# Patient Record
Sex: MALE | Race: WHITE | NOT HISPANIC OR LATINO | URBAN - METROPOLITAN AREA
[De-identification: names, ages, dates, MRNs, and addresses within clinical notes are randomized per-mention and may not be internally consistent; named-entity substitution may affect disease eponyms.]

---

## 2017-10-23 ENCOUNTER — OFFICE (OUTPATIENT)
Dept: URBAN - METROPOLITAN AREA OTHER 6 | Facility: OTHER | Age: 82
End: 2017-10-23

## 2017-10-23 VITALS — SYSTOLIC BLOOD PRESSURE: 128 MMHG | HEART RATE: 90 BPM | WEIGHT: 164 LBS | DIASTOLIC BLOOD PRESSURE: 68 MMHG

## 2017-10-23 DIAGNOSIS — Z80.0 FAMILY HISTORY OF MALIGNANT NEOPLASM OF DIGESTIVE ORGANS: ICD-10-CM

## 2017-10-23 DIAGNOSIS — D50.9 IRON DEFICIENCY ANEMIA, UNSPECIFIED: ICD-10-CM

## 2017-10-23 PROCEDURE — 99202 OFFICE O/P NEW SF 15 MIN: CPT | Performed by: INTERNAL MEDICINE

## 2017-12-12 ENCOUNTER — HOSPITAL ENCOUNTER (OUTPATIENT)
Facility: HOSPITAL | Age: 82
Setting detail: HOSPITAL OUTPATIENT SURGERY
Discharge: HOME | End: 2017-12-12
Attending: INTERNAL MEDICINE | Admitting: INTERNAL MEDICINE

## 2017-12-12 ENCOUNTER — ON CAMPUS - OUTPATIENT (OUTPATIENT)
Dept: URBAN - METROPOLITAN AREA HOSPITAL 114 | Facility: HOSPITAL | Age: 82
End: 2017-12-12

## 2017-12-12 ENCOUNTER — ANESTHESIA (OUTPATIENT)
Dept: GASTROENTEROLOGY | Facility: HOSPITAL | Age: 82
End: 2017-12-12

## 2017-12-12 ENCOUNTER — ANESTHESIA EVENT (OUTPATIENT)
Dept: GASTROENTEROLOGY | Facility: HOSPITAL | Age: 82
End: 2017-12-12

## 2017-12-12 VITALS
HEIGHT: 67 IN | OXYGEN SATURATION: 94 % | DIASTOLIC BLOOD PRESSURE: 76 MMHG | SYSTOLIC BLOOD PRESSURE: 132 MMHG | BODY MASS INDEX: 25.43 KG/M2 | WEIGHT: 162 LBS | RESPIRATION RATE: 16 BRPM | HEART RATE: 64 BPM | TEMPERATURE: 97.4 F

## 2017-12-12 DIAGNOSIS — D50.9 IRON DEFICIENCY ANEMIA: ICD-10-CM

## 2017-12-12 DIAGNOSIS — K29.50 UNSPECIFIED CHRONIC GASTRITIS WITHOUT BLEEDING: ICD-10-CM

## 2017-12-12 DIAGNOSIS — K55.20 ANGIODYSPLASIA OF COLON WITHOUT HEMORRHAGE: ICD-10-CM

## 2017-12-12 DIAGNOSIS — D50.9 IRON DEFICIENCY ANEMIA, UNSPECIFIED: ICD-10-CM

## 2017-12-12 DIAGNOSIS — K21.0 GASTRO-ESOPHAGEAL REFLUX DISEASE WITH ESOPHAGITIS: ICD-10-CM

## 2017-12-12 PROCEDURE — 45382 COLONOSCOPY W/CONTROL BLEED: CPT | Performed by: INTERNAL MEDICINE

## 2017-12-12 PROCEDURE — 88305 TISSUE EXAM BY PATHOLOGIST: CPT | Performed by: INTERNAL MEDICINE

## 2017-12-12 PROCEDURE — 25010000002 PROPOFOL 10 MG/ML EMULSION: Performed by: ANESTHESIOLOGY

## 2017-12-12 PROCEDURE — 43239 EGD BIOPSY SINGLE/MULTIPLE: CPT | Performed by: INTERNAL MEDICINE

## 2017-12-12 PROCEDURE — 88312 SPECIAL STAINS GROUP 1: CPT | Performed by: INTERNAL MEDICINE

## 2017-12-12 RX ORDER — ASPIRIN 325 MG
81 TABLET ORAL DAILY
COMMUNITY
End: 2019-09-23

## 2017-12-12 RX ORDER — LIDOCAINE HYDROCHLORIDE 20 MG/ML
INJECTION, SOLUTION INFILTRATION; PERINEURAL AS NEEDED
Status: DISCONTINUED | OUTPATIENT
Start: 2017-12-12 | End: 2017-12-12 | Stop reason: SURG

## 2017-12-12 RX ORDER — PROPOFOL 10 MG/ML
VIAL (ML) INTRAVENOUS AS NEEDED
Status: DISCONTINUED | OUTPATIENT
Start: 2017-12-12 | End: 2017-12-12 | Stop reason: SURG

## 2017-12-12 RX ORDER — SODIUM CHLORIDE 0.9 % (FLUSH) 0.9 %
3 SYRINGE (ML) INJECTION AS NEEDED
Status: DISCONTINUED | OUTPATIENT
Start: 2017-12-12 | End: 2017-12-12 | Stop reason: HOSPADM

## 2017-12-12 RX ORDER — AMLODIPINE BESYLATE 5 MG/1
5 TABLET ORAL DAILY
COMMUNITY
End: 2020-05-04 | Stop reason: SDDI

## 2017-12-12 RX ORDER — SODIUM CHLORIDE, SODIUM LACTATE, POTASSIUM CHLORIDE, CALCIUM CHLORIDE 600; 310; 30; 20 MG/100ML; MG/100ML; MG/100ML; MG/100ML
1000 INJECTION, SOLUTION INTRAVENOUS CONTINUOUS PRN
Status: DISCONTINUED | OUTPATIENT
Start: 2017-12-12 | End: 2017-12-12 | Stop reason: HOSPADM

## 2017-12-12 RX ORDER — LIDOCAINE HYDROCHLORIDE 10 MG/ML
0.5 INJECTION, SOLUTION INFILTRATION; PERINEURAL ONCE AS NEEDED
Status: DISCONTINUED | OUTPATIENT
Start: 2017-12-12 | End: 2017-12-12 | Stop reason: HOSPADM

## 2017-12-12 RX ORDER — HYDROXYCHLOROQUINE SULFATE 200 MG/1
200 TABLET, FILM COATED ORAL 2 TIMES DAILY
COMMUNITY

## 2017-12-12 RX ORDER — LEVETIRACETAM 500 MG/1
500 TABLET ORAL 2 TIMES DAILY
COMMUNITY

## 2017-12-12 RX ORDER — PROPOFOL 10 MG/ML
VIAL (ML) INTRAVENOUS CONTINUOUS PRN
Status: DISCONTINUED | OUTPATIENT
Start: 2017-12-12 | End: 2017-12-12 | Stop reason: SURG

## 2017-12-12 RX ADMIN — PROPOFOL 300 MCG/KG/MIN: 10 INJECTION, EMULSION INTRAVENOUS at 09:23

## 2017-12-12 RX ADMIN — PROPOFOL 25 MG: 10 INJECTION, EMULSION INTRAVENOUS at 09:39

## 2017-12-12 RX ADMIN — LIDOCAINE HYDROCHLORIDE 40 MG: 20 INJECTION, SOLUTION INFILTRATION; PERINEURAL at 09:23

## 2017-12-12 RX ADMIN — SODIUM CHLORIDE, POTASSIUM CHLORIDE, SODIUM LACTATE AND CALCIUM CHLORIDE: 600; 310; 30; 20 INJECTION, SOLUTION INTRAVENOUS at 09:20

## 2017-12-12 RX ADMIN — SODIUM CHLORIDE, POTASSIUM CHLORIDE, SODIUM LACTATE AND CALCIUM CHLORIDE 1000 ML: 600; 310; 30; 20 INJECTION, SOLUTION INTRAVENOUS at 09:02

## 2017-12-12 RX ADMIN — PROPOFOL 100 MG: 10 INJECTION, EMULSION INTRAVENOUS at 09:23

## 2017-12-12 NOTE — PLAN OF CARE
Problem: GI Endoscopy (Adult)  Intervention: Monitor/Manage Procedure Recovery    12/12/17 0830   Respiratory Interventions   Airway/Ventilation Management airway patency maintained   Coping/Psychosocial Interventions   Environmental Support calm environment promoted   Activity   Activity Type activity adjusted per tolerance   Cardiac Interventions   Warming Thermoregulation Maintenance warm blankets applied         Goal: Signs and Symptoms of Listed Potential Problems Will be Absent or Manageable (GI Endoscopy)  Outcome: Ongoing (interventions implemented as appropriate)    12/12/17 0830   GI Endoscopy   Problems Assessed (GI Endoscopy) pain   Problems Present (GI Endoscopy) none

## 2017-12-12 NOTE — ANESTHESIA PREPROCEDURE EVALUATION
Anesthesia Evaluation     Patient summary reviewed and Nursing notes reviewed          Airway   Mallampati: II  TM distance: <3 FB  Neck ROM: full  possible difficult intubation  Dental - normal exam     Pulmonary - normal exam   (+) a smoker Former,   Cardiovascular - normal exam    (+) hypertension,       Neuro/Psych  (+) seizures,    GI/Hepatic/Renal/Endo - negative ROS     Musculoskeletal (-) negative ROS    Abdominal  - normal exam   Substance History - negative use     OB/GYN negative ob/gyn ROS         Other                                      Anesthesia Plan    ASA 2     MAC     intravenous induction   Anesthetic plan and risks discussed with patient.

## 2017-12-12 NOTE — H&P
Patient Care Team:  Tej Cedillo Jr., MD as PCP - General  Tej Cedillo Jr., MD as PCP - Family Medicine    Chief complaint anemia on labs     Subjective     History of Present Illness  Feels fine also family history of colon cancer in a sibling been on plaquenil for RA>  Review of Systems   All other systems reviewed and are negative.       Past Medical History:   Diagnosis Date   • Cancer     skin cancer   • Hypertension    • Seizures      Past Surgical History:   Procedure Laterality Date   • APPENDECTOMY       History reviewed. No pertinent family history.  Social History   Substance Use Topics   • Smoking status: Former Smoker     Quit date: 12/12/1987   • Smokeless tobacco: None   • Alcohol use No     Prescriptions Prior to Admission   Medication Sig Dispense Refill Last Dose   • amLODIPine (NORVASC) 5 MG tablet Take 5 mg by mouth Daily.   12/12/2017 at Unknown time   • aspirin 325 MG tablet Take 81 mg by mouth Daily.   12/7/2017   • hydroxychloroquine (PLAQUENIL) 200 MG tablet Take  by mouth Daily.   12/12/2017 at Unknown time   • levETIRAcetam (KEPPRA) 500 MG tablet Take 500 mg by mouth 2 (Two) Times a Day.   12/12/2017 at Unknown time   • Multiple Vitamin (MULTI VITAMIN MENS PO) Take  by mouth.   12/12/2017 at Unknown time     Allergies:  Review of patient's allergies indicates no known allergies.    Objective      Vital Signs  Temp:  [98.1 °F (36.7 °C)] 98.1 °F (36.7 °C)  Heart Rate:  [69] 69  Resp:  [16] 16  BP: (145)/(74) 145/74    Physical Exam   Constitutional: He is oriented to person, place, and time. He appears well-developed and well-nourished.   HENT:   Mouth/Throat: Oropharynx is clear and moist.   Eyes: Conjunctivae are normal.   Neck: Neck supple.   Cardiovascular: Normal rate.    Pulmonary/Chest: Effort normal.   Abdominal: Soft.   Neurological: He is alert and oriented to person, place, and time.   Skin: Skin is warm and dry.   Psychiatric: He has a normal mood and affect.        Results Review:   I reviewed the patient's new clinical results.      Assessment/Plan     Active Problems:    * No active hospital problems. *      Assessment:  (Iron deficiency anemia  Family history of colon cancer ).     Plan:   (Upper and lower tract endoscopy, risks, alternatives and benefits dicussed  with patient and patient is agreeable to proceed.).       I discussed the patients findings and my recommendations with patient and nursing staff    Lucio Stein MD  12/12/17  9:24 AM

## 2017-12-12 NOTE — ANESTHESIA POSTPROCEDURE EVALUATION
"Patient: Juan C Ca    Procedure Summary     Date Anesthesia Start Anesthesia Stop Room / Location    12/12/17 0920 1003  [facility]       Procedure Diagnosis Surgeon Provider    ESOPHAGOGASTRODUODENOSCOPY with BX  (N/A Esophagus); COLONOSCOPY TO CECUM AND TI WITH APC CAUTERY OF RIGHT COLON AVM  (N/A ) No diagnosis on file. MD Tremaine Cardozo MD          Anesthesia Type: MAC  Last vitals  BP   145/74 (12/12/17 0851)   Temp   36.7 °C (98.1 °F) (12/12/17 0851)   Pulse   69 (12/12/17 0851)   Resp   16 (12/12/17 0851)     SpO2   96 % (12/12/17 0851)     Post Anesthesia Care and Evaluation    Patient location during evaluation: PHASE II  Patient participation: complete - patient participated  Level of consciousness: awake and alert  Pain management: adequate  Airway patency: patent  Anesthetic complications: No anesthetic complications  PONV Status: none  Cardiovascular status: acceptable  Respiratory status: acceptable  Hydration status: acceptable    Comments: /60 (BP Location: Left arm, Patient Position: Lying)  Pulse 67  Temp 36.7 °C (98.1 °F) (Oral)   Resp 15  Ht 170.2 cm (67\")  Wt 73.5 kg (162 lb)  SpO2 97%  BMI 25.37 kg/m2        "

## 2017-12-14 LAB
LAB AP CASE REPORT: NORMAL
Lab: NORMAL
PATH REPORT.ADDENDUM SPEC: NORMAL
PATH REPORT.FINAL DX SPEC: NORMAL
PATH REPORT.GROSS SPEC: NORMAL

## 2019-02-18 ENCOUNTER — TRANSCRIBE ORDERS (OUTPATIENT)
Dept: ADMINISTRATIVE | Facility: HOSPITAL | Age: 84
End: 2019-02-18

## 2019-02-18 DIAGNOSIS — R26.81 UNSTEADY GAIT: Primary | ICD-10-CM

## 2019-02-26 ENCOUNTER — HOSPITAL ENCOUNTER (OUTPATIENT)
Dept: MRI IMAGING | Facility: HOSPITAL | Age: 84
Discharge: HOME | End: 2019-02-26
Admitting: INTERNAL MEDICINE

## 2019-02-26 DIAGNOSIS — R26.81 UNSTEADY GAIT: ICD-10-CM

## 2019-02-26 PROCEDURE — 70551 MRI BRAIN STEM W/O DYE: CPT

## 2019-02-27 ENCOUNTER — APPOINTMENT (OUTPATIENT)
Dept: MRI IMAGING | Facility: HOSPITAL | Age: 84
End: 2019-02-27

## 2019-04-08 ENCOUNTER — LAB (OUTPATIENT)
Dept: LAB | Facility: HOSPITAL | Age: 84
End: 2019-04-08

## 2019-04-08 ENCOUNTER — CONSULT (OUTPATIENT)
Dept: ONCOLOGY | Facility: CLINIC | Age: 84
End: 2019-04-08

## 2019-04-08 VITALS
WEIGHT: 155.5 LBS | HEIGHT: 68 IN | DIASTOLIC BLOOD PRESSURE: 70 MMHG | BODY MASS INDEX: 23.57 KG/M2 | TEMPERATURE: 98.1 F | HEART RATE: 92 BPM | OXYGEN SATURATION: 95 % | RESPIRATION RATE: 18 BRPM | SYSTOLIC BLOOD PRESSURE: 192 MMHG

## 2019-04-08 DIAGNOSIS — D50.9 IRON DEFICIENCY ANEMIA, UNSPECIFIED IRON DEFICIENCY ANEMIA TYPE: ICD-10-CM

## 2019-04-08 DIAGNOSIS — D50.9 IRON DEFICIENCY ANEMIA, UNSPECIFIED IRON DEFICIENCY ANEMIA TYPE: Primary | ICD-10-CM

## 2019-04-08 DIAGNOSIS — D64.9 ANEMIA, UNSPECIFIED TYPE: Primary | ICD-10-CM

## 2019-04-08 LAB
BASOPHILS # BLD AUTO: 0.06 10*3/MM3 (ref 0–0.2)
BASOPHILS NFR BLD AUTO: 0.5 % (ref 0–1.5)
CHROMATIN AB SERPL-ACNC: 10.5 IU/ML (ref 0–14)
CRP SERPL-MCNC: 6.81 MG/DL (ref 0–0.5)
DEPRECATED RDW RBC AUTO: 46.6 FL (ref 37–54)
EOSINOPHIL # BLD AUTO: 0.17 10*3/MM3 (ref 0–0.4)
EOSINOPHIL NFR BLD AUTO: 1.5 % (ref 0.3–6.2)
ERYTHROCYTE [DISTWIDTH] IN BLOOD BY AUTOMATED COUNT: 15.9 % (ref 12.3–15.4)
HCT VFR BLD AUTO: 37.8 % (ref 37.5–51)
HGB BLD-MCNC: 12.4 G/DL (ref 13–17.7)
HGB RETIC QN AUTO: 29.2 PG (ref 29.8–36.1)
IMM GRANULOCYTES # BLD AUTO: 0.08 10*3/MM3 (ref 0–0.05)
IMM GRANULOCYTES NFR BLD AUTO: 0.7 % (ref 0–0.5)
IMM RETICS NFR: 5.4 % (ref 3–15.8)
LYMPHOCYTES # BLD AUTO: 1.57 10*3/MM3 (ref 0.7–3.1)
LYMPHOCYTES NFR BLD AUTO: 14.1 % (ref 19.6–45.3)
MCH RBC QN AUTO: 26.7 PG (ref 26.6–33)
MCHC RBC AUTO-ENTMCNC: 32.8 G/DL (ref 31.5–35.7)
MCV RBC AUTO: 81.5 FL (ref 79–97)
MONOCYTES # BLD AUTO: 1.11 10*3/MM3 (ref 0.1–0.9)
MONOCYTES NFR BLD AUTO: 9.9 % (ref 5–12)
NEUTROPHILS # BLD AUTO: 8.17 10*3/MM3 (ref 1.4–7)
NEUTROPHILS NFR BLD AUTO: 73.3 % (ref 42.7–76)
NRBC BLD AUTO-RTO: 0 /100 WBC (ref 0–0)
PLATELET # BLD AUTO: 241 10*3/MM3 (ref 140–450)
PMV BLD AUTO: 11.1 FL (ref 6–12)
RBC # BLD AUTO: 4.64 10*6/MM3 (ref 4.14–5.8)
RETICS/RBC NFR AUTO: 0.99 % (ref 0.5–1.5)
VIT B12 BLD-MCNC: 709 PG/ML (ref 211–946)
WBC NRBC COR # BLD: 11.16 10*3/MM3 (ref 3.4–10.8)

## 2019-04-08 PROCEDURE — 83540 ASSAY OF IRON: CPT | Performed by: INTERNAL MEDICINE

## 2019-04-08 PROCEDURE — 84466 ASSAY OF TRANSFERRIN: CPT | Performed by: INTERNAL MEDICINE

## 2019-04-08 PROCEDURE — 99204 OFFICE O/P NEW MOD 45 MIN: CPT | Performed by: INTERNAL MEDICINE

## 2019-04-08 PROCEDURE — 83615 LACTATE (LD) (LDH) ENZYME: CPT | Performed by: INTERNAL MEDICINE

## 2019-04-08 PROCEDURE — 36415 COLL VENOUS BLD VENIPUNCTURE: CPT | Performed by: INTERNAL MEDICINE

## 2019-04-08 PROCEDURE — 82607 VITAMIN B-12: CPT | Performed by: INTERNAL MEDICINE

## 2019-04-08 PROCEDURE — 82728 ASSAY OF FERRITIN: CPT | Performed by: INTERNAL MEDICINE

## 2019-04-08 PROCEDURE — 85046 RETICYTE/HGB CONCENTRATE: CPT | Performed by: INTERNAL MEDICINE

## 2019-04-08 PROCEDURE — 80053 COMPREHEN METABOLIC PANEL: CPT | Performed by: INTERNAL MEDICINE

## 2019-04-08 PROCEDURE — 86140 C-REACTIVE PROTEIN: CPT | Performed by: INTERNAL MEDICINE

## 2019-04-08 PROCEDURE — 85025 COMPLETE CBC W/AUTO DIFF WBC: CPT | Performed by: INTERNAL MEDICINE

## 2019-04-08 PROCEDURE — 86431 RHEUMATOID FACTOR QUANT: CPT | Performed by: INTERNAL MEDICINE

## 2019-04-08 RX ORDER — PREDNISONE 1 MG/1
TABLET ORAL
COMMUNITY
Start: 2019-03-19 | End: 2019-06-13

## 2019-04-08 RX ORDER — FERROUS SULFATE 325(65) MG
1 TABLET ORAL
COMMUNITY
End: 2019-04-08 | Stop reason: SDUPTHER

## 2019-04-08 RX ORDER — FERROUS SULFATE 325(65) MG
1 TABLET ORAL
Qty: 90 TABLET | Refills: 3 | Status: SHIPPED | OUTPATIENT
Start: 2019-04-08 | End: 2019-06-13

## 2019-04-09 LAB
ALBUMIN SERPL-MCNC: 3.2 G/DL (ref 2.9–4.4)
ALBUMIN SERPL-MCNC: 4 G/DL (ref 3.5–5.2)
ALBUMIN/GLOB SERPL: 1 {RATIO} (ref 0.7–1.7)
ALBUMIN/GLOB SERPL: 1.5 G/DL (ref 1.1–2.4)
ALP SERPL-CCNC: 75 U/L (ref 38–116)
ALPHA1 GLOB FLD ELPH-MCNC: 0.4 G/DL (ref 0–0.4)
ALPHA2 GLOB SERPL ELPH-MCNC: 1.2 G/DL (ref 0.4–1)
ALT SERPL W P-5'-P-CCNC: 18 U/L (ref 0–41)
ANION GAP SERPL CALCULATED.3IONS-SCNC: 16.9 MMOL/L
AST SERPL-CCNC: 21 U/L (ref 0–40)
B-GLOBULIN SERPL ELPH-MCNC: 1.1 G/DL (ref 0.7–1.3)
BILIRUB SERPL-MCNC: 0.3 MG/DL (ref 0.2–1.2)
BUN BLD-MCNC: 17 MG/DL (ref 6–20)
BUN/CREAT SERPL: 16.5 (ref 7.3–30)
CALCIUM SPEC-SCNC: 9.2 MG/DL (ref 8.5–10.2)
CENTROMERE B AB SER-ACNC: <0.2 AI (ref 0–0.9)
CHLORIDE SERPL-SCNC: 98 MMOL/L (ref 98–107)
CHROMATIN AB SERPL-ACNC: <0.2 AI (ref 0–0.9)
CO2 SERPL-SCNC: 23.1 MMOL/L (ref 22–29)
CREAT BLD-MCNC: 1.03 MG/DL (ref 0.7–1.3)
DSDNA AB SER-ACNC: <1 IU/ML (ref 0–9)
ENA JO1 AB SER-ACNC: <0.2 AI (ref 0–0.9)
ENA RNP AB SER-ACNC: 0.2 AI (ref 0–0.9)
ENA SCL70 AB SER-ACNC: <0.2 AI (ref 0–0.9)
ENA SM AB SER-ACNC: <0.2 AI (ref 0–0.9)
ENA SS-A AB SER-ACNC: <0.2 AI (ref 0–0.9)
ENA SS-B AB SER-ACNC: <0.2 AI (ref 0–0.9)
FERRITIN SERPL-MCNC: 88.1 NG/ML (ref 30–400)
FOLATE BLD-MCNC: 470.7 NG/ML
FOLATE RBC-MCNC: 1315 NG/ML
GAMMA GLOB SERPL ELPH-MCNC: 0.7 G/DL (ref 0.4–1.8)
GFR SERPL CREATININE-BSD FRML MDRD: 69 ML/MIN/1.73
GLOBULIN SER CALC-MCNC: 3.4 G/DL (ref 2.2–3.9)
GLOBULIN UR ELPH-MCNC: 2.7 GM/DL (ref 1.8–3.5)
GLUCOSE BLD-MCNC: 87 MG/DL (ref 74–124)
HCT VFR BLD AUTO: 35.8 % (ref 37.5–51)
IGA SERPL-MCNC: 48 MG/DL (ref 61–437)
IGG SERPL-MCNC: 607 MG/DL (ref 700–1600)
IGM SERPL-MCNC: 60 MG/DL (ref 15–143)
INTERPRETATION SERPL IEP-IMP: ABNORMAL
IRON 24H UR-MRATE: 22 MCG/DL (ref 59–158)
IRON SATN MFR SERPL: 6 % (ref 14–48)
KAPPA LC SERPL-MCNC: 14 MG/L (ref 3.3–19.4)
KAPPA LC/LAMBDA SER: 1.01 {RATIO} (ref 0.26–1.65)
LAMBDA LC FREE SERPL-MCNC: 13.8 MG/L (ref 5.7–26.3)
LDH SERPL-CCNC: 199 U/L (ref 99–259)
Lab: ABNORMAL
Lab: NORMAL
M-SPIKE: ABNORMAL G/DL
POTASSIUM BLD-SCNC: 4 MMOL/L (ref 3.5–4.7)
PROT SERPL-MCNC: 6.6 G/DL (ref 6–8.5)
PROT SERPL-MCNC: 6.7 G/DL (ref 6.3–8)
REF LAB TEST METHOD: NORMAL
SODIUM BLD-SCNC: 138 MMOL/L (ref 134–145)
TIBC SERPL-MCNC: 346 MCG/DL (ref 249–505)
TRANSFERRIN SERPL-MCNC: 247 MG/DL (ref 200–360)

## 2019-05-06 ENCOUNTER — OFFICE VISIT (OUTPATIENT)
Dept: ONCOLOGY | Facility: CLINIC | Age: 84
End: 2019-05-06

## 2019-05-06 ENCOUNTER — LAB (OUTPATIENT)
Dept: LAB | Facility: HOSPITAL | Age: 84
End: 2019-05-06

## 2019-05-06 VITALS
BODY MASS INDEX: 23.37 KG/M2 | OXYGEN SATURATION: 92 % | HEIGHT: 68 IN | RESPIRATION RATE: 16 BRPM | TEMPERATURE: 98.6 F | SYSTOLIC BLOOD PRESSURE: 136 MMHG | WEIGHT: 154.2 LBS | HEART RATE: 74 BPM | DIASTOLIC BLOOD PRESSURE: 71 MMHG

## 2019-05-06 DIAGNOSIS — D50.9 IRON DEFICIENCY ANEMIA, UNSPECIFIED IRON DEFICIENCY ANEMIA TYPE: Primary | ICD-10-CM

## 2019-05-06 DIAGNOSIS — T45.4X5A ADVERSE EFFECT OF PREPARATION OF IRON COMPOUND, INITIAL ENCOUNTER: ICD-10-CM

## 2019-05-06 DIAGNOSIS — D50.9 IRON DEFICIENCY ANEMIA, UNSPECIFIED IRON DEFICIENCY ANEMIA TYPE: ICD-10-CM

## 2019-05-06 LAB
ALBUMIN SERPL-MCNC: 3.7 G/DL (ref 3.5–5.2)
ALBUMIN/GLOB SERPL: 1.1 G/DL (ref 1.1–2.4)
ALP SERPL-CCNC: 71 U/L (ref 38–116)
ALT SERPL W P-5'-P-CCNC: 17 U/L (ref 0–41)
ANION GAP SERPL CALCULATED.3IONS-SCNC: 11.4 MMOL/L
AST SERPL-CCNC: 18 U/L (ref 0–40)
BASOPHILS # BLD AUTO: 0.05 10*3/MM3 (ref 0–0.2)
BASOPHILS NFR BLD AUTO: 0.6 % (ref 0–1.5)
BILIRUB SERPL-MCNC: 0.3 MG/DL (ref 0.2–1.2)
BUN BLD-MCNC: 18 MG/DL (ref 6–20)
BUN/CREAT SERPL: 16.8 (ref 7.3–30)
CALCIUM SPEC-SCNC: 9.4 MG/DL (ref 8.5–10.2)
CHLORIDE SERPL-SCNC: 103 MMOL/L (ref 98–107)
CO2 SERPL-SCNC: 25.6 MMOL/L (ref 22–29)
CREAT BLD-MCNC: 1.07 MG/DL (ref 0.7–1.3)
DEPRECATED RDW RBC AUTO: 47.9 FL (ref 37–54)
EOSINOPHIL # BLD AUTO: 0.12 10*3/MM3 (ref 0–0.4)
EOSINOPHIL NFR BLD AUTO: 1.4 % (ref 0.3–6.2)
ERYTHROCYTE [DISTWIDTH] IN BLOOD BY AUTOMATED COUNT: 15.8 % (ref 12.3–15.4)
FERRITIN SERPL-MCNC: 84.3 NG/ML (ref 30–400)
GFR SERPL CREATININE-BSD FRML MDRD: 66 ML/MIN/1.73
GLOBULIN UR ELPH-MCNC: 3.3 GM/DL (ref 1.8–3.5)
GLUCOSE BLD-MCNC: 126 MG/DL (ref 74–124)
HCT VFR BLD AUTO: 37 % (ref 37.5–51)
HGB BLD-MCNC: 11.7 G/DL (ref 13–17.7)
IMM GRANULOCYTES # BLD AUTO: 0.03 10*3/MM3 (ref 0–0.05)
IMM GRANULOCYTES NFR BLD AUTO: 0.3 % (ref 0–0.5)
IRON 24H UR-MRATE: 34 MCG/DL (ref 59–158)
IRON SATN MFR SERPL: 10 % (ref 14–48)
LYMPHOCYTES # BLD AUTO: 1.33 10*3/MM3 (ref 0.7–3.1)
LYMPHOCYTES NFR BLD AUTO: 15.4 % (ref 19.6–45.3)
MCH RBC QN AUTO: 26.4 PG (ref 26.6–33)
MCHC RBC AUTO-ENTMCNC: 31.6 G/DL (ref 31.5–35.7)
MCV RBC AUTO: 83.3 FL (ref 79–97)
MONOCYTES # BLD AUTO: 0.66 10*3/MM3 (ref 0.1–0.9)
MONOCYTES NFR BLD AUTO: 7.6 % (ref 5–12)
NEUTROPHILS # BLD AUTO: 6.46 10*3/MM3 (ref 1.7–7)
NEUTROPHILS NFR BLD AUTO: 74.7 % (ref 42.7–76)
NRBC BLD AUTO-RTO: 0 /100 WBC (ref 0–0.2)
PLATELET # BLD AUTO: 313 10*3/MM3 (ref 140–450)
PMV BLD AUTO: 9.3 FL (ref 6–12)
POTASSIUM BLD-SCNC: 4.3 MMOL/L (ref 3.5–4.7)
PROT SERPL-MCNC: 7 G/DL (ref 6.3–8)
RBC # BLD AUTO: 4.44 10*6/MM3 (ref 4.14–5.8)
SODIUM BLD-SCNC: 140 MMOL/L (ref 134–145)
TIBC SERPL-MCNC: 336 MCG/DL (ref 249–505)
TRANSFERRIN SERPL-MCNC: 240 MG/DL (ref 200–360)
WBC NRBC COR # BLD: 8.65 10*3/MM3 (ref 3.4–10.8)

## 2019-05-06 PROCEDURE — 84466 ASSAY OF TRANSFERRIN: CPT | Performed by: INTERNAL MEDICINE

## 2019-05-06 PROCEDURE — 82728 ASSAY OF FERRITIN: CPT | Performed by: INTERNAL MEDICINE

## 2019-05-06 PROCEDURE — 83540 ASSAY OF IRON: CPT | Performed by: INTERNAL MEDICINE

## 2019-05-06 PROCEDURE — 80053 COMPREHEN METABOLIC PANEL: CPT | Performed by: INTERNAL MEDICINE

## 2019-05-06 PROCEDURE — 36415 COLL VENOUS BLD VENIPUNCTURE: CPT | Performed by: INTERNAL MEDICINE

## 2019-05-06 PROCEDURE — 85025 COMPLETE CBC W/AUTO DIFF WBC: CPT | Performed by: INTERNAL MEDICINE

## 2019-05-06 PROCEDURE — 99214 OFFICE O/P EST MOD 30 MIN: CPT | Performed by: NURSE PRACTITIONER

## 2019-05-06 RX ORDER — SODIUM CHLORIDE 9 MG/ML
250 INJECTION, SOLUTION INTRAVENOUS ONCE
Status: CANCELLED | OUTPATIENT
Start: 2019-05-08

## 2019-05-06 RX ORDER — PROCHLORPERAZINE MALEATE 10 MG
10 TABLET ORAL ONCE
Status: CANCELLED
Start: 2019-05-08 | End: 2019-05-08

## 2019-05-06 RX ORDER — PROCHLORPERAZINE MALEATE 10 MG
10 TABLET ORAL ONCE
Status: CANCELLED
Start: 2019-05-15 | End: 2019-05-15

## 2019-05-06 RX ORDER — SODIUM CHLORIDE 9 MG/ML
250 INJECTION, SOLUTION INTRAVENOUS ONCE
Status: CANCELLED | OUTPATIENT
Start: 2019-05-15

## 2019-05-06 NOTE — PROGRESS NOTES
Subjective     REASON FOR FOLLOW-UP: Iron deficiency anemia    HISTORY OF PRESENT ILLNESS:  The patient is a 83 y.o. year old male who is here for an opinion about the above issue.    History of Present Illness    The patient returns to the office today for scheduled follow-up in 1 month lab review.  When seen by Dr. Novoa 1 month ago, was recommended he increase his oral iron to twice daily due to persistent iron deficiency.  He did increase his oral iron to twice daily, though unfortunately has developed constipation.  He has been able to manage his constipation, though does require daily medications.  He denies signs or symptoms of bleeding.  He reports his stools are dark in color though denies his stools being black or red in color.  He continues to struggle with pain secondary to his rheumatoid arthritis.  He does have exertional shortness of breath, though denies shortness of breath or chest pain at rest.  He reports fatigue which is unchanged.    Past Medical History:   Diagnosis Date   • Cancer (CMS/HCC)     skin cancer   • CVD (cerebrovascular disease)     with remote infarcts per CT of head.   • H/O Iron deficiency anemia    • H/O Traumatic brain injury (CMS/HCC)    • H/O: pneumonia 04/2015   • Hyperlipidemia    • Hypertension    • Rheumatoid arthritis (CMS/HCC)    • Seizures (CMS/HCC)     Started in 1960s.        Past Surgical History:   Procedure Laterality Date   • APPENDECTOMY  1950   • CATARACT EXTRACTION     • COLONOSCOPY N/A 12/12/2017    Procedure: COLONOSCOPY TO CECUM AND TI WITH APC CAUTERY OF RIGHT COLON AVM ;  Surgeon: Lucio Stein MD;  Location: [facility];  Service:    • ENDOSCOPY N/A 12/12/2017    Procedure: ESOPHAGOGASTRODUODENOSCOPY with BX ;  Surgeon: Lucio Stein MD;  Location: [facility] ENDOSCOPY;  Service:         Current Outpatient Medications:   •  amLODIPine (NORVASC) 5 MG tablet, Take 5 mg by mouth Daily., Disp: , Rfl:   •  aspirin 325 MG tablet, Take 81 mg by mouth  Daily., Disp: , Rfl:   •  Cholecalciferol (VITAMIN D PO), Take  by mouth Every 14 (Fourteen) Days., Disp: , Rfl:   •  Docusate Sodium (COLACE PO), Take  by mouth As Needed., Disp: , Rfl:   •  ferrous sulfate 325 (65 FE) MG tablet, Take 1 tablet by mouth 3 (Three) Times a Day With Meals., Disp: 90 tablet, Rfl: 3  •  hydroxychloroquine (PLAQUENIL) 200 MG tablet, Take  by mouth Daily., Disp: , Rfl:   •  levETIRAcetam (KEPPRA) 500 MG tablet, Take 500 mg by mouth 2 (Two) Times a Day., Disp: , Rfl:   •  Multiple Vitamin (MULTI VITAMIN MENS PO), Take  by mouth., Disp: , Rfl:   •  predniSONE (DELTASONE) 5 MG tablet, , Disp: , Rfl:     ALLERGIES:  No Known Allergies     Social History     Socioeconomic History   • Marital status:      Spouse name: Abbie   • Number of children: Not on file   • Years of education: College   • Highest education level: Not on file   Occupational History     Employer: RETIRED   Tobacco Use   • Smoking status: Former Smoker     Years: 15.00     Types: Cigarettes     Last attempt to quit: 1987     Years since quittin.4   • Smokeless tobacco: Never Used   Substance and Sexual Activity   • Alcohol use: No   • Drug use: No   • Sexual activity: Defer        Family History   Problem Relation Age of Onset   • Colon cancer Other    • Hypertension Mother    • Mental illness Mother    • Hypertension Father    • Heart disease Sister    • Hypertension Sister    • Colon cancer Sister    • Skin cancer Sister    • Heart disease Brother    • Hypertension Brother    • Lung cancer Brother    • Diabetes Daughter    • Throat cancer Brother    • Cancer Brother         Bladder   • Prostate cancer Brother       I have reviewed the patient's medical history in detail and updated the computerized patient record.    Review of Systems   Constitutional: Positive for fatigue. Negative for appetite change, chills, diaphoresis, fever and unexpected weight change.   HENT: Negative for hearing loss, sore throat  "and trouble swallowing.    Respiratory: Positive for shortness of breath. Negative for cough, chest tightness and wheezing.    Cardiovascular: Negative for chest pain, palpitations and leg swelling.   Gastrointestinal: Negative for abdominal distention, abdominal pain, constipation, diarrhea, nausea and vomiting.   Genitourinary: Negative for dysuria, frequency, hematuria and urgency.   Musculoskeletal: Positive for arthralgias and back pain. Negative for joint swelling.        No muscle weakness.   Skin: Negative for rash and wound.   Neurological: Positive for weakness. Negative for seizures, syncope, speech difficulty, numbness and headaches.   Hematological: Negative for adenopathy. Does not bruise/bleed easily.   Psychiatric/Behavioral: Negative for behavioral problems, confusion and suicidal ideas.   Review of systems 5/6/2019 unchanged from previous office visit except as updated    Objective     Vitals:    05/06/19 1138   BP: 136/71   Pulse: 74   Resp: 16   Temp: 98.6 °F (37 °C)   TempSrc: Oral   SpO2: 92%   Weight: 69.9 kg (154 lb 3.2 oz)   Height: 172.5 cm (67.91\")   PainSc: 5  Comment: pain in both legs     Current Status 5/6/2019   ECOG score 0       Physical Exam    GENERAL:  Well-developed, well-nourished in no acute distress.   SKIN:  Warm, dry without rashes, purpura or petechiae.  EYES:  Pupils equal, round and reactive to light.  EOMs intact.  Conjunctivae normal.  EARS:  Hearing intact.  NOSE:  Septum midline.  No excoriations or nasal discharge.  CHEST:  Lungs clear to auscultation. Good airflow.  CARDIAC:  Regular rate and rhythm without murmurs, rubs or gallops. Normal S1,S2.  ABDOMEN:  Soft, nontender, bowel sounds present  EXTREMITIES:  No clubbing, cyanosis or edema.  NEUROLOGICAL:  Cranial Nerves II-XII grossly intact.  No focal neurological deficits.  PSYCHIATRIC:  Normal affect and mood.    Physical exam 5/6/2019 unchanged from previous office visit except as updated    RECENT " "LABS:  Results from last 7 days   Lab Units 19  1054   WBC 10*3/mm3 8.65   NEUTROS ABS 10*3/mm3 6.46   HEMOGLOBIN g/dL 11.7*   HEMATOCRIT % 37.0*   PLATELETS 10*3/mm3 313     Results from last 7 days   Lab Units 19  1054   SODIUM mmol/L 140   POTASSIUM mmol/L 4.3   CHLORIDE mmol/L 103   CO2 mmol/L 25.6   BUN mg/dL 18   CREATININE mg/dL 1.07   CALCIUM mg/dL 9.4   ALBUMIN g/dL 3.70   BILIRUBIN mg/dL 0.3   ALK PHOS U/L 71   ALT (SGPT) U/L 17   AST (SGOT) U/L 18   GLUCOSE mg/dL 126*   FERRITIN ng/mL 84.30   IRON mcg/dL 34*   TIBC mcg/dL 336           Assessment/Plan   1.  Iron deficiency anemia: Patient was seen by Dr. Cedillo 2019 he was found to be iron deficient.  He denied any active GI bleeding.  He has been started on ferrous sulfate 325 mg 1 p.o. daily but without adequate control.  B12, folate, serum protein electrophoresis and flow cytometry negative.  Ferrous sulfate increased to twice daily 2019 though this resulted in abdominal cramping and constipation.    The patient remains iron deficient today with a decreased hemoglobin at 11.7, iron saturation of 10%.  We will proceed with Injectafer x2.  The patient has previously been seen by Dr. Rachel Stein 2017 with EGD and colonoscopy.  EGD showed grade B esophagitis, chronic gastritis.  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  Due to recurrent iron deficiency, we will ask patient to follow-up with Dr. Stein     2.  History of severe rheumatoid arthritis for which he is on Plaquenil.  His rheumatologist wants to start methotrexate but he is very concerned about starting methotrexate.    3.  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    4.  Family history of lung cancer in a brother in his 80s and he  at 88 with lung cancer.  Second brother had throat cancer in the 70s and  at 80 with \"cancer.  Third " brother had bladder cancer and fourth brother had prostate cancer he had a sister with colon cancer.    Plan:    1. Discontinue oral iron due to intolerance.  2. Proceed with Injectafer x2 beginning 5/8/2019.  Have reviewed potential side effects of IV iron including nausea.  3. Follow-up with gastroenterology, Dr. Stein for possible repeat EGD colonoscopy due to recurrent iron deficiency.  4. Return as scheduled for follow-up with Dr. Novoa in 5 months with repeat CBC, ferritin, iron profile at that time.    Today's plan was discussed reviewed with Dr. Novoa who is in agreement.  Along with discussing iron deficiency today, we have also reviewed results of serum immunofixation, flow cytometry.  Total of 25 minutes spent with patient and his daughter, 23 minutes spent in face-to-face counseling and education.    Rossana Campa, APRN  05/06/2019

## 2019-05-08 ENCOUNTER — INFUSION (OUTPATIENT)
Dept: ONCOLOGY | Facility: HOSPITAL | Age: 84
End: 2019-05-08

## 2019-05-08 VITALS
SYSTOLIC BLOOD PRESSURE: 151 MMHG | BODY MASS INDEX: 23.69 KG/M2 | HEART RATE: 78 BPM | TEMPERATURE: 97.8 F | WEIGHT: 155.4 LBS | OXYGEN SATURATION: 95 % | DIASTOLIC BLOOD PRESSURE: 74 MMHG

## 2019-05-08 DIAGNOSIS — D50.9 IRON DEFICIENCY ANEMIA, UNSPECIFIED IRON DEFICIENCY ANEMIA TYPE: ICD-10-CM

## 2019-05-08 DIAGNOSIS — T45.4X5A ADVERSE EFFECT OF IRON, INITIAL ENCOUNTER: Primary | ICD-10-CM

## 2019-05-08 PROCEDURE — 25010000002 FERRIC CARBOXYMALTOSE 750 MG/15ML SOLUTION 15 ML VIAL: Performed by: NURSE PRACTITIONER

## 2019-05-08 PROCEDURE — 96374 THER/PROPH/DIAG INJ IV PUSH: CPT | Performed by: NURSE PRACTITIONER

## 2019-05-08 PROCEDURE — 63710000001 PROCHLORPERAZINE MALEATE PER 5 MG: Performed by: NURSE PRACTITIONER

## 2019-05-08 RX ORDER — PROCHLORPERAZINE MALEATE 5 MG/1
10 TABLET ORAL ONCE
Status: COMPLETED | OUTPATIENT
Start: 2019-05-08 | End: 2019-05-08

## 2019-05-08 RX ORDER — SODIUM CHLORIDE 9 MG/ML
250 INJECTION, SOLUTION INTRAVENOUS ONCE
Status: COMPLETED | OUTPATIENT
Start: 2019-05-08 | End: 2019-05-08

## 2019-05-08 RX ADMIN — SODIUM CHLORIDE 250 ML: 9 INJECTION, SOLUTION INTRAVENOUS at 15:43

## 2019-05-08 RX ADMIN — PROCHLORPERAZINE MALEATE 10 MG: 5 TABLET, FILM COATED ORAL at 15:32

## 2019-05-08 RX ADMIN — FERRIC CARBOXYMALTOSE INJECTION 750 MG: 50 INJECTION, SOLUTION INTRAVENOUS at 15:43

## 2019-05-15 ENCOUNTER — INFUSION (OUTPATIENT)
Dept: ONCOLOGY | Facility: HOSPITAL | Age: 84
End: 2019-05-15

## 2019-05-15 VITALS
SYSTOLIC BLOOD PRESSURE: 123 MMHG | HEART RATE: 80 BPM | WEIGHT: 156 LBS | OXYGEN SATURATION: 95 % | BODY MASS INDEX: 23.78 KG/M2 | DIASTOLIC BLOOD PRESSURE: 72 MMHG | TEMPERATURE: 97.5 F

## 2019-05-15 DIAGNOSIS — D50.9 IRON DEFICIENCY ANEMIA, UNSPECIFIED IRON DEFICIENCY ANEMIA TYPE: ICD-10-CM

## 2019-05-15 DIAGNOSIS — T45.4X5A ADVERSE EFFECT OF IRON, INITIAL ENCOUNTER: Primary | ICD-10-CM

## 2019-05-15 PROCEDURE — 63710000001 PROCHLORPERAZINE MALEATE PER 5 MG: Performed by: NURSE PRACTITIONER

## 2019-05-15 PROCEDURE — 25010000002 FERRIC CARBOXYMALTOSE 750 MG/15ML SOLUTION 15 ML VIAL: Performed by: NURSE PRACTITIONER

## 2019-05-15 PROCEDURE — 96374 THER/PROPH/DIAG INJ IV PUSH: CPT | Performed by: NURSE PRACTITIONER

## 2019-05-15 RX ORDER — PROCHLORPERAZINE MALEATE 5 MG/1
10 TABLET ORAL ONCE
Status: COMPLETED | OUTPATIENT
Start: 2019-05-15 | End: 2019-05-15

## 2019-05-15 RX ORDER — SODIUM CHLORIDE 9 MG/ML
250 INJECTION, SOLUTION INTRAVENOUS ONCE
Status: COMPLETED | OUTPATIENT
Start: 2019-05-15 | End: 2019-05-15

## 2019-05-15 RX ADMIN — SODIUM CHLORIDE 250 ML: 900 INJECTION, SOLUTION INTRAVENOUS at 15:00

## 2019-05-15 RX ADMIN — FERRIC CARBOXYMALTOSE INJECTION 750 MG: 50 INJECTION, SOLUTION INTRAVENOUS at 15:00

## 2019-05-15 RX ADMIN — PROCHLORPERAZINE MALEATE 10 MG: 5 TABLET, FILM COATED ORAL at 14:53

## 2019-06-13 ENCOUNTER — APPOINTMENT (OUTPATIENT)
Dept: CT IMAGING | Facility: HOSPITAL | Age: 84
End: 2019-06-13

## 2019-06-13 ENCOUNTER — HOSPITAL ENCOUNTER (EMERGENCY)
Facility: HOSPITAL | Age: 84
Discharge: HOME | End: 2019-06-13
Attending: EMERGENCY MEDICINE | Admitting: EMERGENCY MEDICINE

## 2019-06-13 ENCOUNTER — APPOINTMENT (OUTPATIENT)
Dept: GENERAL RADIOLOGY | Facility: HOSPITAL | Age: 84
End: 2019-06-13

## 2019-06-13 VITALS
DIASTOLIC BLOOD PRESSURE: 58 MMHG | TEMPERATURE: 98.3 F | BODY MASS INDEX: 24.3 KG/M2 | HEIGHT: 67 IN | RESPIRATION RATE: 19 BRPM | WEIGHT: 154.8 LBS | OXYGEN SATURATION: 95 % | HEART RATE: 82 BPM | SYSTOLIC BLOOD PRESSURE: 125 MMHG

## 2019-06-13 DIAGNOSIS — B34.9 ACUTE VIRAL SYNDROME: Primary | ICD-10-CM

## 2019-06-13 DIAGNOSIS — R10.13 DYSPEPSIA: ICD-10-CM

## 2019-06-13 LAB
ALBUMIN SERPL-MCNC: 3.6 G/DL (ref 3.5–5.2)
ALBUMIN/GLOB SERPL: 1.2 G/DL
ALP SERPL-CCNC: 64 U/L (ref 39–117)
ALT SERPL W P-5'-P-CCNC: 20 U/L (ref 1–41)
ANION GAP SERPL CALCULATED.3IONS-SCNC: 12.8 MMOL/L
AST SERPL-CCNC: 26 U/L (ref 1–40)
BASOPHILS # BLD AUTO: 0.06 10*3/MM3 (ref 0–0.2)
BASOPHILS NFR BLD AUTO: 0.5 % (ref 0–1.5)
BILIRUB SERPL-MCNC: 0.5 MG/DL (ref 0.2–1.2)
BILIRUB UR QL STRIP: NEGATIVE
BUN BLD-MCNC: 18 MG/DL (ref 8–23)
BUN/CREAT SERPL: 15.7 (ref 7–25)
CALCIUM SPEC-SCNC: 8.9 MG/DL (ref 8.6–10.5)
CHLORIDE SERPL-SCNC: 95 MMOL/L (ref 98–107)
CLARITY UR: CLEAR
CO2 SERPL-SCNC: 23.2 MMOL/L (ref 22–29)
COLOR UR: ABNORMAL
CREAT BLD-MCNC: 1.15 MG/DL (ref 0.76–1.27)
D-LACTATE SERPL-SCNC: 0.8 MMOL/L (ref 0.5–2)
DEPRECATED RDW RBC AUTO: 62.9 FL (ref 37–54)
EOSINOPHIL # BLD AUTO: 0.08 10*3/MM3 (ref 0–0.4)
EOSINOPHIL NFR BLD AUTO: 0.7 % (ref 0.3–6.2)
ERYTHROCYTE [DISTWIDTH] IN BLOOD BY AUTOMATED COUNT: 19.6 % (ref 12.3–15.4)
GFR SERPL CREATININE-BSD FRML MDRD: 61 ML/MIN/1.73
GLOBULIN UR ELPH-MCNC: 3 GM/DL
GLUCOSE BLD-MCNC: 101 MG/DL (ref 65–99)
GLUCOSE UR STRIP-MCNC: NEGATIVE MG/DL
HCT VFR BLD AUTO: 40.6 % (ref 37.5–51)
HGB BLD-MCNC: 13.2 G/DL (ref 13–17.7)
HGB UR QL STRIP.AUTO: NEGATIVE
HOLD SPECIMEN: NORMAL
HOLD SPECIMEN: NORMAL
IMM GRANULOCYTES # BLD AUTO: 0.04 10*3/MM3 (ref 0–0.05)
IMM GRANULOCYTES NFR BLD AUTO: 0.4 % (ref 0–0.5)
KETONES UR QL STRIP: ABNORMAL
LEUKOCYTE ESTERASE UR QL STRIP.AUTO: NEGATIVE
LYMPHOCYTES # BLD AUTO: 0.72 10*3/MM3 (ref 0.7–3.1)
LYMPHOCYTES NFR BLD AUTO: 6.6 % (ref 19.6–45.3)
MCH RBC QN AUTO: 28.5 PG (ref 26.6–33)
MCHC RBC AUTO-ENTMCNC: 32.5 G/DL (ref 31.5–35.7)
MCV RBC AUTO: 87.7 FL (ref 79–97)
MONOCYTES # BLD AUTO: 0.97 10*3/MM3 (ref 0.1–0.9)
MONOCYTES NFR BLD AUTO: 8.9 % (ref 5–12)
NEUTROPHILS # BLD AUTO: 9.06 10*3/MM3 (ref 1.7–7)
NEUTROPHILS NFR BLD AUTO: 82.9 % (ref 42.7–76)
NITRITE UR QL STRIP: NEGATIVE
NRBC BLD AUTO-RTO: 0 /100 WBC (ref 0–0.2)
PH UR STRIP.AUTO: 5.5 [PH] (ref 5–8)
PLATELET # BLD AUTO: 196 10*3/MM3 (ref 140–450)
PMV BLD AUTO: 10.4 FL (ref 6–12)
POTASSIUM BLD-SCNC: 3.9 MMOL/L (ref 3.5–5.2)
PROT SERPL-MCNC: 6.6 G/DL (ref 6–8.5)
PROT UR QL STRIP: NEGATIVE
RBC # BLD AUTO: 4.63 10*6/MM3 (ref 4.14–5.8)
SODIUM BLD-SCNC: 131 MMOL/L (ref 136–145)
SP GR UR STRIP: 1.02 (ref 1–1.03)
UROBILINOGEN UR QL STRIP: ABNORMAL
WBC NRBC COR # BLD: 10.93 10*3/MM3 (ref 3.4–10.8)
WHOLE BLOOD HOLD SPECIMEN: NORMAL
WHOLE BLOOD HOLD SPECIMEN: NORMAL

## 2019-06-13 PROCEDURE — 84145 PROCALCITONIN (PCT): CPT | Performed by: EMERGENCY MEDICINE

## 2019-06-13 PROCEDURE — 81003 URINALYSIS AUTO W/O SCOPE: CPT

## 2019-06-13 PROCEDURE — 71250 CT THORAX DX C-: CPT

## 2019-06-13 PROCEDURE — 80053 COMPREHEN METABOLIC PANEL: CPT

## 2019-06-13 PROCEDURE — 71046 X-RAY EXAM CHEST 2 VIEWS: CPT

## 2019-06-13 PROCEDURE — 83605 ASSAY OF LACTIC ACID: CPT

## 2019-06-13 PROCEDURE — 85025 COMPLETE CBC W/AUTO DIFF WBC: CPT

## 2019-06-13 PROCEDURE — 36415 COLL VENOUS BLD VENIPUNCTURE: CPT

## 2019-06-13 PROCEDURE — 87040 BLOOD CULTURE FOR BACTERIA: CPT

## 2019-06-13 PROCEDURE — 70450 CT HEAD/BRAIN W/O DYE: CPT

## 2019-06-13 PROCEDURE — 99284 EMERGENCY DEPT VISIT MOD MDM: CPT

## 2019-06-13 RX ORDER — SULFASALAZINE 500 MG/1
500 TABLET ORAL 2 TIMES DAILY
COMMUNITY
End: 2019-06-18 | Stop reason: HOSPADM

## 2019-06-13 RX ORDER — SODIUM CHLORIDE 0.9 % (FLUSH) 0.9 %
10 SYRINGE (ML) INJECTION AS NEEDED
Status: DISCONTINUED | OUTPATIENT
Start: 2019-06-13 | End: 2019-06-14 | Stop reason: HOSPADM

## 2019-06-14 ENCOUNTER — APPOINTMENT (OUTPATIENT)
Dept: GENERAL RADIOLOGY | Facility: HOSPITAL | Age: 84
End: 2019-06-14

## 2019-06-14 ENCOUNTER — HOSPITAL ENCOUNTER (INPATIENT)
Facility: HOSPITAL | Age: 84
LOS: 4 days | Discharge: HOME | End: 2019-06-18
Attending: EMERGENCY MEDICINE | Admitting: HOSPITALIST

## 2019-06-14 DIAGNOSIS — R50.2 DRUG INDUCED FEVER: ICD-10-CM

## 2019-06-14 DIAGNOSIS — M06.9 RHEUMATOID ARTHRITIS INVOLVING BOTH HANDS, UNSPECIFIED RHEUMATOID FACTOR PRESENCE: ICD-10-CM

## 2019-06-14 DIAGNOSIS — D84.9 IMMUNOCOMPROMISED PATIENT (HCC): ICD-10-CM

## 2019-06-14 DIAGNOSIS — R09.02 HYPOXIA: ICD-10-CM

## 2019-06-14 DIAGNOSIS — R50.9 FEBRILE ILLNESS, ACUTE: Primary | ICD-10-CM

## 2019-06-14 PROBLEM — R56.9 SEIZURES: Status: ACTIVE | Noted: 2019-06-14

## 2019-06-14 PROBLEM — I10 HYPERTENSION: Status: ACTIVE | Noted: 2019-06-14

## 2019-06-14 PROBLEM — E78.5 HYPERLIPIDEMIA: Status: ACTIVE | Noted: 2019-06-14

## 2019-06-14 LAB
ALBUMIN SERPL-MCNC: 3.6 G/DL (ref 3.5–5.2)
ALBUMIN/GLOB SERPL: 1 G/DL
ALP SERPL-CCNC: 73 U/L (ref 39–117)
ALT SERPL W P-5'-P-CCNC: 33 U/L (ref 1–41)
ANION GAP SERPL CALCULATED.3IONS-SCNC: 15 MMOL/L
AST SERPL-CCNC: 35 U/L (ref 1–40)
B PARAPERT DNA SPEC QL NAA+PROBE: NOT DETECTED
B PERT DNA SPEC QL NAA+PROBE: NOT DETECTED
BASOPHILS # BLD AUTO: 0.04 10*3/MM3 (ref 0–0.2)
BASOPHILS NFR BLD AUTO: 0.4 % (ref 0–1.5)
BILIRUB SERPL-MCNC: 0.5 MG/DL (ref 0.2–1.2)
BUN BLD-MCNC: 18 MG/DL (ref 8–23)
BUN/CREAT SERPL: 19.6 (ref 7–25)
C PNEUM DNA NPH QL NAA+NON-PROBE: NOT DETECTED
CALCIUM SPEC-SCNC: 9 MG/DL (ref 8.6–10.5)
CHLORIDE SERPL-SCNC: 93 MMOL/L (ref 98–107)
CO2 SERPL-SCNC: 23 MMOL/L (ref 22–29)
CREAT BLD-MCNC: 0.92 MG/DL (ref 0.76–1.27)
CRP SERPL-MCNC: 28.46 MG/DL (ref 0–0.5)
D-LACTATE SERPL-SCNC: 1.6 MMOL/L (ref 0.5–2)
DEPRECATED RDW RBC AUTO: 61.6 FL (ref 37–54)
EOSINOPHIL # BLD AUTO: 0.11 10*3/MM3 (ref 0–0.4)
EOSINOPHIL NFR BLD AUTO: 1 % (ref 0.3–6.2)
ERYTHROCYTE [DISTWIDTH] IN BLOOD BY AUTOMATED COUNT: 19.2 % (ref 12.3–15.4)
ERYTHROCYTE [SEDIMENTATION RATE] IN BLOOD: 57 MM/HR (ref 0–20)
FLUAV H1 2009 PAND RNA NPH QL NAA+PROBE: NOT DETECTED
FLUAV H1 HA GENE NPH QL NAA+PROBE: NOT DETECTED
FLUAV H3 RNA NPH QL NAA+PROBE: NOT DETECTED
FLUAV SUBTYP SPEC NAA+PROBE: NOT DETECTED
FLUBV RNA ISLT QL NAA+PROBE: NOT DETECTED
GFR SERPL CREATININE-BSD FRML MDRD: 79 ML/MIN/1.73
GLOBULIN UR ELPH-MCNC: 3.5 GM/DL
GLUCOSE BLD-MCNC: 149 MG/DL (ref 65–99)
HADV DNA SPEC NAA+PROBE: NOT DETECTED
HCOV 229E RNA SPEC QL NAA+PROBE: NOT DETECTED
HCOV HKU1 RNA SPEC QL NAA+PROBE: NOT DETECTED
HCOV NL63 RNA SPEC QL NAA+PROBE: NOT DETECTED
HCOV OC43 RNA SPEC QL NAA+PROBE: NOT DETECTED
HCT VFR BLD AUTO: 41.7 % (ref 37.5–51)
HGB BLD-MCNC: 13.6 G/DL (ref 13–17.7)
HMPV RNA NPH QL NAA+NON-PROBE: NOT DETECTED
HPIV1 RNA SPEC QL NAA+PROBE: NOT DETECTED
HPIV2 RNA SPEC QL NAA+PROBE: NOT DETECTED
HPIV3 RNA NPH QL NAA+PROBE: NOT DETECTED
HPIV4 P GENE NPH QL NAA+PROBE: NOT DETECTED
IMM GRANULOCYTES # BLD AUTO: 0.05 10*3/MM3 (ref 0–0.05)
IMM GRANULOCYTES NFR BLD AUTO: 0.5 % (ref 0–0.5)
LYMPHOCYTES # BLD AUTO: 0.31 10*3/MM3 (ref 0.7–3.1)
LYMPHOCYTES NFR BLD AUTO: 2.8 % (ref 19.6–45.3)
M PNEUMO IGG SER IA-ACNC: NOT DETECTED
MCH RBC QN AUTO: 28.8 PG (ref 26.6–33)
MCHC RBC AUTO-ENTMCNC: 32.6 G/DL (ref 31.5–35.7)
MCV RBC AUTO: 88.3 FL (ref 79–97)
MONOCYTES # BLD AUTO: 0.76 10*3/MM3 (ref 0.1–0.9)
MONOCYTES NFR BLD AUTO: 6.9 % (ref 5–12)
NEUTROPHILS # BLD AUTO: 9.71 10*3/MM3 (ref 1.7–7)
NEUTROPHILS NFR BLD AUTO: 88.4 % (ref 42.7–76)
NRBC BLD AUTO-RTO: 0 /100 WBC (ref 0–0.2)
PLATELET # BLD AUTO: 196 10*3/MM3 (ref 140–450)
PMV BLD AUTO: 10.6 FL (ref 6–12)
POTASSIUM BLD-SCNC: 3.7 MMOL/L (ref 3.5–5.2)
PROCALCITONIN SERPL-MCNC: 0.2 NG/ML (ref 0.1–0.25)
PROCALCITONIN SERPL-MCNC: 0.22 NG/ML (ref 0.1–0.25)
PROT SERPL-MCNC: 7.1 G/DL (ref 6–8.5)
RBC # BLD AUTO: 4.72 10*6/MM3 (ref 4.14–5.8)
RHINOVIRUS RNA SPEC NAA+PROBE: NOT DETECTED
RSV RNA NPH QL NAA+NON-PROBE: NOT DETECTED
SODIUM BLD-SCNC: 131 MMOL/L (ref 136–145)
WBC NRBC COR # BLD: 10.98 10*3/MM3 (ref 3.4–10.8)

## 2019-06-14 PROCEDURE — 85652 RBC SED RATE AUTOMATED: CPT | Performed by: EMERGENCY MEDICINE

## 2019-06-14 PROCEDURE — 87040 BLOOD CULTURE FOR BACTERIA: CPT | Performed by: EMERGENCY MEDICINE

## 2019-06-14 PROCEDURE — 87633 RESP VIRUS 12-25 TARGETS: CPT | Performed by: EMERGENCY MEDICINE

## 2019-06-14 PROCEDURE — 80053 COMPREHEN METABOLIC PANEL: CPT | Performed by: EMERGENCY MEDICINE

## 2019-06-14 PROCEDURE — 99285 EMERGENCY DEPT VISIT HI MDM: CPT

## 2019-06-14 PROCEDURE — 83605 ASSAY OF LACTIC ACID: CPT | Performed by: EMERGENCY MEDICINE

## 2019-06-14 PROCEDURE — 87798 DETECT AGENT NOS DNA AMP: CPT | Performed by: EMERGENCY MEDICINE

## 2019-06-14 PROCEDURE — 87486 CHLMYD PNEUM DNA AMP PROBE: CPT | Performed by: EMERGENCY MEDICINE

## 2019-06-14 PROCEDURE — 87581 M.PNEUMON DNA AMP PROBE: CPT | Performed by: EMERGENCY MEDICINE

## 2019-06-14 PROCEDURE — 85025 COMPLETE CBC W/AUTO DIFF WBC: CPT | Performed by: EMERGENCY MEDICINE

## 2019-06-14 PROCEDURE — 71046 X-RAY EXAM CHEST 2 VIEWS: CPT

## 2019-06-14 PROCEDURE — 84145 PROCALCITONIN (PCT): CPT | Performed by: EMERGENCY MEDICINE

## 2019-06-14 PROCEDURE — 86140 C-REACTIVE PROTEIN: CPT | Performed by: EMERGENCY MEDICINE

## 2019-06-14 PROCEDURE — 25010000002 CEFTRIAXONE PER 250 MG: Performed by: EMERGENCY MEDICINE

## 2019-06-14 RX ORDER — SODIUM CHLORIDE 0.9 % (FLUSH) 0.9 %
3-10 SYRINGE (ML) INJECTION AS NEEDED
Status: DISCONTINUED | OUTPATIENT
Start: 2019-06-14 | End: 2019-06-18 | Stop reason: HOSPADM

## 2019-06-14 RX ORDER — CEFTRIAXONE SODIUM 1 G/50ML
1 INJECTION, SOLUTION INTRAVENOUS ONCE
Status: COMPLETED | OUTPATIENT
Start: 2019-06-14 | End: 2019-06-14

## 2019-06-14 RX ORDER — ONDANSETRON 2 MG/ML
4 INJECTION INTRAMUSCULAR; INTRAVENOUS EVERY 6 HOURS PRN
Status: DISCONTINUED | OUTPATIENT
Start: 2019-06-14 | End: 2019-06-18 | Stop reason: HOSPADM

## 2019-06-14 RX ORDER — AMLODIPINE BESYLATE 5 MG/1
5 TABLET ORAL DAILY
Status: DISCONTINUED | OUTPATIENT
Start: 2019-06-15 | End: 2019-06-18 | Stop reason: HOSPADM

## 2019-06-14 RX ORDER — ACETAMINOPHEN 325 MG/1
650 TABLET ORAL EVERY 4 HOURS PRN
Status: DISCONTINUED | OUTPATIENT
Start: 2019-06-14 | End: 2019-06-18 | Stop reason: HOSPADM

## 2019-06-14 RX ORDER — ASPIRIN 325 MG
162 TABLET ORAL DAILY
Status: DISCONTINUED | OUTPATIENT
Start: 2019-06-15 | End: 2019-06-18 | Stop reason: HOSPADM

## 2019-06-14 RX ORDER — IBUPROFEN 800 MG/1
800 TABLET ORAL ONCE
Status: COMPLETED | OUTPATIENT
Start: 2019-06-14 | End: 2019-06-14

## 2019-06-14 RX ORDER — LEVETIRACETAM 500 MG/1
500 TABLET ORAL EVERY 12 HOURS SCHEDULED
Status: DISCONTINUED | OUTPATIENT
Start: 2019-06-14 | End: 2019-06-18 | Stop reason: HOSPADM

## 2019-06-14 RX ORDER — SODIUM CHLORIDE 9 MG/ML
125 INJECTION, SOLUTION INTRAVENOUS CONTINUOUS
Status: DISCONTINUED | OUTPATIENT
Start: 2019-06-14 | End: 2019-06-15

## 2019-06-14 RX ORDER — HYDROXYCHLOROQUINE SULFATE 200 MG/1
200 TABLET, FILM COATED ORAL 2 TIMES DAILY
Status: DISCONTINUED | OUTPATIENT
Start: 2019-06-14 | End: 2019-06-18 | Stop reason: HOSPADM

## 2019-06-14 RX ORDER — ONDANSETRON 4 MG/1
4 TABLET, FILM COATED ORAL EVERY 6 HOURS PRN
Status: DISCONTINUED | OUTPATIENT
Start: 2019-06-14 | End: 2019-06-18 | Stop reason: HOSPADM

## 2019-06-14 RX ORDER — ACETAMINOPHEN 500 MG
1000 TABLET ORAL ONCE
Status: COMPLETED | OUTPATIENT
Start: 2019-06-14 | End: 2019-06-14

## 2019-06-14 RX ORDER — CEFTRIAXONE SODIUM 1 G/50ML
1 INJECTION, SOLUTION INTRAVENOUS EVERY 24 HOURS
Status: DISCONTINUED | OUTPATIENT
Start: 2019-06-15 | End: 2019-06-15

## 2019-06-14 RX ORDER — SODIUM CHLORIDE 0.9 % (FLUSH) 0.9 %
3 SYRINGE (ML) INJECTION EVERY 12 HOURS SCHEDULED
Status: DISCONTINUED | OUTPATIENT
Start: 2019-06-14 | End: 2019-06-15

## 2019-06-14 RX ADMIN — SODIUM CHLORIDE 125 ML/HR: 9 INJECTION, SOLUTION INTRAVENOUS at 18:06

## 2019-06-14 RX ADMIN — LEVETIRACETAM 500 MG: 500 TABLET, FILM COATED ORAL at 22:59

## 2019-06-14 RX ADMIN — CEFTRIAXONE SODIUM 1 G: 1 INJECTION, SOLUTION INTRAVENOUS at 19:03

## 2019-06-14 RX ADMIN — SODIUM CHLORIDE, PRESERVATIVE FREE 3 ML: 5 INJECTION INTRAVENOUS at 22:59

## 2019-06-14 RX ADMIN — IBUPROFEN 800 MG: 800 TABLET, FILM COATED ORAL at 17:43

## 2019-06-14 RX ADMIN — ACETAMINOPHEN 1000 MG: 500 TABLET, FILM COATED ORAL at 17:07

## 2019-06-14 RX ADMIN — HYDROXYCHLOROQUINE SULFATE 200 MG: 200 TABLET, FILM COATED ORAL at 22:59

## 2019-06-15 ENCOUNTER — APPOINTMENT (OUTPATIENT)
Dept: CT IMAGING | Facility: HOSPITAL | Age: 84
End: 2019-06-15

## 2019-06-15 PROBLEM — R50.2 DRUG INDUCED FEVER: Status: ACTIVE | Noted: 2019-06-15

## 2019-06-15 LAB
ANION GAP SERPL CALCULATED.3IONS-SCNC: 11.9 MMOL/L
BASOPHILS # BLD AUTO: 0.03 10*3/MM3 (ref 0–0.2)
BASOPHILS NFR BLD AUTO: 0.3 % (ref 0–1.5)
BUN BLD-MCNC: 16 MG/DL (ref 8–23)
BUN/CREAT SERPL: 21.3 (ref 7–25)
CALCIUM SPEC-SCNC: 8.2 MG/DL (ref 8.6–10.5)
CHLORIDE SERPL-SCNC: 102 MMOL/L (ref 98–107)
CO2 SERPL-SCNC: 22.1 MMOL/L (ref 22–29)
CREAT BLD-MCNC: 0.75 MG/DL (ref 0.76–1.27)
CRP SERPL-MCNC: 24.91 MG/DL (ref 0–0.5)
D-LACTATE SERPL-SCNC: 1.3 MMOL/L (ref 0.5–2)
DEPRECATED RDW RBC AUTO: 62.7 FL (ref 37–54)
EOSINOPHIL # BLD AUTO: 0.13 10*3/MM3 (ref 0–0.4)
EOSINOPHIL NFR BLD AUTO: 1.4 % (ref 0.3–6.2)
ERYTHROCYTE [DISTWIDTH] IN BLOOD BY AUTOMATED COUNT: 19 % (ref 12.3–15.4)
ERYTHROCYTE [SEDIMENTATION RATE] IN BLOOD: 36 MM/HR (ref 0–20)
GFR SERPL CREATININE-BSD FRML MDRD: 100 ML/MIN/1.73
GLUCOSE BLD-MCNC: 139 MG/DL (ref 65–99)
HCT VFR BLD AUTO: 38.7 % (ref 37.5–51)
HGB BLD-MCNC: 12.6 G/DL (ref 13–17.7)
IMM GRANULOCYTES # BLD AUTO: 0.05 10*3/MM3 (ref 0–0.05)
IMM GRANULOCYTES NFR BLD AUTO: 0.5 % (ref 0–0.5)
LYMPHOCYTES # BLD AUTO: 0.25 10*3/MM3 (ref 0.7–3.1)
LYMPHOCYTES NFR BLD AUTO: 2.6 % (ref 19.6–45.3)
MCH RBC QN AUTO: 28.8 PG (ref 26.6–33)
MCHC RBC AUTO-ENTMCNC: 32.6 G/DL (ref 31.5–35.7)
MCV RBC AUTO: 88.6 FL (ref 79–97)
MONOCYTES # BLD AUTO: 0.58 10*3/MM3 (ref 0.1–0.9)
MONOCYTES NFR BLD AUTO: 6.1 % (ref 5–12)
NEUTROPHILS # BLD AUTO: 8.4 10*3/MM3 (ref 1.7–7)
NEUTROPHILS NFR BLD AUTO: 89.1 % (ref 42.7–76)
NRBC BLD AUTO-RTO: 0.1 /100 WBC (ref 0–0.2)
PLATELET # BLD AUTO: 163 10*3/MM3 (ref 140–450)
PMV BLD AUTO: 11 FL (ref 6–12)
POTASSIUM BLD-SCNC: 3.6 MMOL/L (ref 3.5–5.2)
PROCALCITONIN SERPL-MCNC: 0.35 NG/ML (ref 0.1–0.25)
RBC # BLD AUTO: 4.37 10*6/MM3 (ref 4.14–5.8)
SODIUM BLD-SCNC: 136 MMOL/L (ref 136–145)
WBC NRBC COR # BLD: 9.44 10*3/MM3 (ref 3.4–10.8)

## 2019-06-15 PROCEDURE — 74176 CT ABD & PELVIS W/O CONTRAST: CPT

## 2019-06-15 PROCEDURE — 84145 PROCALCITONIN (PCT): CPT | Performed by: HOSPITALIST

## 2019-06-15 PROCEDURE — 86140 C-REACTIVE PROTEIN: CPT | Performed by: HOSPITALIST

## 2019-06-15 PROCEDURE — 85025 COMPLETE CBC W/AUTO DIFF WBC: CPT | Performed by: HOSPITALIST

## 2019-06-15 PROCEDURE — 36415 COLL VENOUS BLD VENIPUNCTURE: CPT | Performed by: HOSPITALIST

## 2019-06-15 PROCEDURE — 80048 BASIC METABOLIC PNL TOTAL CA: CPT | Performed by: HOSPITALIST

## 2019-06-15 PROCEDURE — 85652 RBC SED RATE AUTOMATED: CPT | Performed by: HOSPITALIST

## 2019-06-15 PROCEDURE — 83605 ASSAY OF LACTIC ACID: CPT | Performed by: HOSPITALIST

## 2019-06-15 RX ADMIN — HYDROXYCHLOROQUINE SULFATE 200 MG: 200 TABLET, FILM COATED ORAL at 09:45

## 2019-06-15 RX ADMIN — ACETAMINOPHEN 650 MG: 325 TABLET, FILM COATED ORAL at 17:01

## 2019-06-15 RX ADMIN — ASPIRIN 162 MG: 325 TABLET ORAL at 09:46

## 2019-06-15 RX ADMIN — SODIUM CHLORIDE 125 ML/HR: 9 INJECTION, SOLUTION INTRAVENOUS at 02:20

## 2019-06-15 RX ADMIN — LEVETIRACETAM 500 MG: 500 TABLET, FILM COATED ORAL at 20:39

## 2019-06-15 RX ADMIN — HYDROXYCHLOROQUINE SULFATE 200 MG: 200 TABLET, FILM COATED ORAL at 20:40

## 2019-06-15 RX ADMIN — AMLODIPINE BESYLATE 5 MG: 5 TABLET ORAL at 09:45

## 2019-06-15 RX ADMIN — LEVETIRACETAM 500 MG: 500 TABLET, FILM COATED ORAL at 09:45

## 2019-06-16 PROBLEM — E87.6 HYPOKALEMIA: Status: ACTIVE | Noted: 2019-06-16

## 2019-06-16 LAB
ALBUMIN SERPL-MCNC: 3.2 G/DL (ref 3.5–5.2)
ALBUMIN/GLOB SERPL: 1.4 G/DL
ALP SERPL-CCNC: 54 U/L (ref 39–117)
ALT SERPL W P-5'-P-CCNC: 28 U/L (ref 1–41)
ANION GAP SERPL CALCULATED.3IONS-SCNC: 12.3 MMOL/L
AST SERPL-CCNC: 26 U/L (ref 1–40)
BASOPHILS # BLD AUTO: 0.03 10*3/MM3 (ref 0–0.2)
BASOPHILS NFR BLD AUTO: 0.5 % (ref 0–1.5)
BILIRUB SERPL-MCNC: 0.3 MG/DL (ref 0.2–1.2)
BUN BLD-MCNC: 12 MG/DL (ref 8–23)
BUN/CREAT SERPL: 15.8 (ref 7–25)
CALCIUM SPEC-SCNC: 8.2 MG/DL (ref 8.6–10.5)
CHLORIDE SERPL-SCNC: 100 MMOL/L (ref 98–107)
CO2 SERPL-SCNC: 22.7 MMOL/L (ref 22–29)
CREAT BLD-MCNC: 0.76 MG/DL (ref 0.76–1.27)
CRP SERPL-MCNC: 18.81 MG/DL (ref 0–0.5)
DEPRECATED RDW RBC AUTO: 63.2 FL (ref 37–54)
EOSINOPHIL # BLD AUTO: 0.17 10*3/MM3 (ref 0–0.4)
EOSINOPHIL NFR BLD AUTO: 2.6 % (ref 0.3–6.2)
ERYTHROCYTE [DISTWIDTH] IN BLOOD BY AUTOMATED COUNT: 19.3 % (ref 12.3–15.4)
ERYTHROCYTE [SEDIMENTATION RATE] IN BLOOD: 55 MM/HR (ref 0–20)
GFR SERPL CREATININE-BSD FRML MDRD: 98 ML/MIN/1.73
GLOBULIN UR ELPH-MCNC: 2.3 GM/DL
GLUCOSE BLD-MCNC: 120 MG/DL (ref 65–99)
HCT VFR BLD AUTO: 33.5 % (ref 37.5–51)
HGB BLD-MCNC: 10.7 G/DL (ref 13–17.7)
IMM GRANULOCYTES # BLD AUTO: 0.02 10*3/MM3 (ref 0–0.05)
IMM GRANULOCYTES NFR BLD AUTO: 0.3 % (ref 0–0.5)
LYMPHOCYTES # BLD AUTO: 0.69 10*3/MM3 (ref 0.7–3.1)
LYMPHOCYTES NFR BLD AUTO: 10.4 % (ref 19.6–45.3)
MCH RBC QN AUTO: 28.3 PG (ref 26.6–33)
MCHC RBC AUTO-ENTMCNC: 31.9 G/DL (ref 31.5–35.7)
MCV RBC AUTO: 88.6 FL (ref 79–97)
MONOCYTES # BLD AUTO: 0.63 10*3/MM3 (ref 0.1–0.9)
MONOCYTES NFR BLD AUTO: 9.5 % (ref 5–12)
NEUTROPHILS # BLD AUTO: 5.08 10*3/MM3 (ref 1.7–7)
NEUTROPHILS NFR BLD AUTO: 76.7 % (ref 42.7–76)
NRBC BLD AUTO-RTO: 0 /100 WBC (ref 0–0.2)
PLATELET # BLD AUTO: 146 10*3/MM3 (ref 140–450)
PMV BLD AUTO: 10 FL (ref 6–12)
POTASSIUM BLD-SCNC: 3.1 MMOL/L (ref 3.5–5.2)
POTASSIUM BLD-SCNC: 4.4 MMOL/L (ref 3.5–5.2)
PROT SERPL-MCNC: 5.5 G/DL (ref 6–8.5)
RBC # BLD AUTO: 3.78 10*6/MM3 (ref 4.14–5.8)
SODIUM BLD-SCNC: 135 MMOL/L (ref 136–145)
WBC NRBC COR # BLD: 6.62 10*3/MM3 (ref 3.4–10.8)

## 2019-06-16 PROCEDURE — 85652 RBC SED RATE AUTOMATED: CPT | Performed by: HOSPITALIST

## 2019-06-16 PROCEDURE — 85025 COMPLETE CBC W/AUTO DIFF WBC: CPT | Performed by: HOSPITALIST

## 2019-06-16 PROCEDURE — 86140 C-REACTIVE PROTEIN: CPT | Performed by: HOSPITALIST

## 2019-06-16 PROCEDURE — 84132 ASSAY OF SERUM POTASSIUM: CPT | Performed by: HOSPITALIST

## 2019-06-16 PROCEDURE — 80053 COMPREHEN METABOLIC PANEL: CPT | Performed by: HOSPITALIST

## 2019-06-16 RX ORDER — DOCUSATE SODIUM 100 MG/1
100 CAPSULE, LIQUID FILLED ORAL 2 TIMES DAILY
Status: DISCONTINUED | OUTPATIENT
Start: 2019-06-16 | End: 2019-06-18 | Stop reason: HOSPADM

## 2019-06-16 RX ORDER — POTASSIUM CHLORIDE 1.5 G/1.77G
40 POWDER, FOR SOLUTION ORAL AS NEEDED
Status: DISCONTINUED | OUTPATIENT
Start: 2019-06-16 | End: 2019-06-18 | Stop reason: HOSPADM

## 2019-06-16 RX ORDER — POTASSIUM CHLORIDE 750 MG/1
40 CAPSULE, EXTENDED RELEASE ORAL AS NEEDED
Status: DISCONTINUED | OUTPATIENT
Start: 2019-06-16 | End: 2019-06-18 | Stop reason: HOSPADM

## 2019-06-16 RX ADMIN — AMLODIPINE BESYLATE 5 MG: 5 TABLET ORAL at 08:52

## 2019-06-16 RX ADMIN — POTASSIUM CHLORIDE 40 MEQ: 750 CAPSULE, EXTENDED RELEASE ORAL at 18:33

## 2019-06-16 RX ADMIN — DOCUSATE SODIUM 100 MG: 100 CAPSULE, LIQUID FILLED ORAL at 23:26

## 2019-06-16 RX ADMIN — DOCUSATE SODIUM 100 MG: 100 CAPSULE, LIQUID FILLED ORAL at 10:59

## 2019-06-16 RX ADMIN — ASPIRIN 162 MG: 325 TABLET ORAL at 08:52

## 2019-06-16 RX ADMIN — POTASSIUM CHLORIDE 40 MEQ: 750 CAPSULE, EXTENDED RELEASE ORAL at 14:18

## 2019-06-16 RX ADMIN — HYDROXYCHLOROQUINE SULFATE 200 MG: 200 TABLET, FILM COATED ORAL at 23:26

## 2019-06-16 RX ADMIN — LEVETIRACETAM 500 MG: 500 TABLET, FILM COATED ORAL at 08:52

## 2019-06-16 RX ADMIN — LEVETIRACETAM 500 MG: 500 TABLET, FILM COATED ORAL at 23:26

## 2019-06-16 RX ADMIN — POTASSIUM CHLORIDE 40 MEQ: 750 CAPSULE, EXTENDED RELEASE ORAL at 10:58

## 2019-06-16 RX ADMIN — HYDROXYCHLOROQUINE SULFATE 200 MG: 200 TABLET, FILM COATED ORAL at 08:52

## 2019-06-16 RX ADMIN — ACETAMINOPHEN 650 MG: 325 TABLET, FILM COATED ORAL at 18:33

## 2019-06-17 PROBLEM — D63.8 ANEMIA OF CHRONIC DISEASE: Chronic | Status: ACTIVE | Noted: 2019-06-17

## 2019-06-17 PROBLEM — R35.0 URINARY FREQUENCY: Status: ACTIVE | Noted: 2019-06-17

## 2019-06-17 LAB
ALBUMIN SERPL-MCNC: 2.8 G/DL (ref 3.5–5.2)
ALBUMIN/GLOB SERPL: 1.1 G/DL
ALP SERPL-CCNC: 70 U/L (ref 39–117)
ALT SERPL W P-5'-P-CCNC: 70 U/L (ref 1–41)
ANION GAP SERPL CALCULATED.3IONS-SCNC: 8.7 MMOL/L
AST SERPL-CCNC: 60 U/L (ref 1–40)
BASOPHILS # BLD AUTO: 0.03 10*3/MM3 (ref 0–0.2)
BASOPHILS NFR BLD AUTO: 0.4 % (ref 0–1.5)
BILIRUB SERPL-MCNC: 0.3 MG/DL (ref 0.2–1.2)
BILIRUB UR QL STRIP: NEGATIVE
BUN BLD-MCNC: 9 MG/DL (ref 8–23)
BUN/CREAT SERPL: 14.1 (ref 7–25)
CALCIUM SPEC-SCNC: 8.5 MG/DL (ref 8.6–10.5)
CHLORIDE SERPL-SCNC: 102 MMOL/L (ref 98–107)
CLARITY UR: CLEAR
CO2 SERPL-SCNC: 22.3 MMOL/L (ref 22–29)
COLOR UR: YELLOW
CREAT BLD-MCNC: 0.64 MG/DL (ref 0.76–1.27)
CRP SERPL-MCNC: 14.13 MG/DL (ref 0–0.5)
D-LACTATE SERPL-SCNC: 0.9 MMOL/L (ref 0.5–2)
DEPRECATED RDW RBC AUTO: 64.6 FL (ref 37–54)
EOSINOPHIL # BLD AUTO: 0.28 10*3/MM3 (ref 0–0.4)
EOSINOPHIL NFR BLD AUTO: 3.9 % (ref 0.3–6.2)
ERYTHROCYTE [DISTWIDTH] IN BLOOD BY AUTOMATED COUNT: 19.8 % (ref 12.3–15.4)
ERYTHROCYTE [SEDIMENTATION RATE] IN BLOOD: 55 MM/HR (ref 0–20)
FERRITIN SERPL-MCNC: 2330 NG/ML (ref 30–400)
FOLATE SERPL-MCNC: 15.7 NG/ML (ref 4.78–24.2)
GFR SERPL CREATININE-BSD FRML MDRD: 120 ML/MIN/1.73
GLOBULIN UR ELPH-MCNC: 2.6 GM/DL
GLUCOSE BLD-MCNC: 115 MG/DL (ref 65–99)
GLUCOSE UR STRIP-MCNC: NEGATIVE MG/DL
HCT VFR BLD AUTO: 35.1 % (ref 37.5–51)
HEMOCCULT STL QL: NEGATIVE
HGB BLD-MCNC: 11.4 G/DL (ref 13–17.7)
HGB UR QL STRIP.AUTO: NEGATIVE
IMM GRANULOCYTES # BLD AUTO: 0.03 10*3/MM3 (ref 0–0.05)
IMM GRANULOCYTES NFR BLD AUTO: 0.4 % (ref 0–0.5)
IRON 24H UR-MRATE: 48 MCG/DL (ref 59–158)
IRON SATN MFR SERPL: 26 % (ref 20–50)
KETONES UR QL STRIP: NEGATIVE
LEUKOCYTE ESTERASE UR QL STRIP.AUTO: NEGATIVE
LYMPHOCYTES # BLD AUTO: 0.86 10*3/MM3 (ref 0.7–3.1)
LYMPHOCYTES NFR BLD AUTO: 12 % (ref 19.6–45.3)
MCH RBC QN AUTO: 29 PG (ref 26.6–33)
MCHC RBC AUTO-ENTMCNC: 32.5 G/DL (ref 31.5–35.7)
MCV RBC AUTO: 89.3 FL (ref 79–97)
MONOCYTES # BLD AUTO: 0.61 10*3/MM3 (ref 0.1–0.9)
MONOCYTES NFR BLD AUTO: 8.5 % (ref 5–12)
NEUTROPHILS # BLD AUTO: 5.37 10*3/MM3 (ref 1.7–7)
NEUTROPHILS NFR BLD AUTO: 74.8 % (ref 42.7–76)
NITRITE UR QL STRIP: NEGATIVE
NRBC BLD AUTO-RTO: 0.1 /100 WBC (ref 0–0.2)
PH UR STRIP.AUTO: 7.5 [PH] (ref 5–8)
PLATELET # BLD AUTO: 176 10*3/MM3 (ref 140–450)
PMV BLD AUTO: 10.1 FL (ref 6–12)
POTASSIUM BLD-SCNC: 4 MMOL/L (ref 3.5–5.2)
PROCALCITONIN SERPL-MCNC: 0.17 NG/ML (ref 0.1–0.25)
PROT SERPL-MCNC: 5.4 G/DL (ref 6–8.5)
PROT UR QL STRIP: NEGATIVE
RBC # BLD AUTO: 3.93 10*6/MM3 (ref 4.14–5.8)
SODIUM BLD-SCNC: 133 MMOL/L (ref 136–145)
SP GR UR STRIP: 1.01 (ref 1–1.03)
TIBC SERPL-MCNC: 185 MCG/DL (ref 298–536)
TRANSFERRIN SERPL-MCNC: 124 MG/DL (ref 200–360)
UROBILINOGEN UR QL STRIP: NORMAL
VIT B12 BLD-MCNC: 1058 PG/ML (ref 211–946)
WBC NRBC COR # BLD: 7.18 10*3/MM3 (ref 3.4–10.8)

## 2019-06-17 PROCEDURE — 82607 VITAMIN B-12: CPT | Performed by: HOSPITALIST

## 2019-06-17 PROCEDURE — 81003 URINALYSIS AUTO W/O SCOPE: CPT | Performed by: HOSPITALIST

## 2019-06-17 PROCEDURE — 84466 ASSAY OF TRANSFERRIN: CPT | Performed by: HOSPITALIST

## 2019-06-17 PROCEDURE — 82746 ASSAY OF FOLIC ACID SERUM: CPT | Performed by: HOSPITALIST

## 2019-06-17 PROCEDURE — 94762 N-INVAS EAR/PLS OXIMTRY CONT: CPT

## 2019-06-17 PROCEDURE — 85652 RBC SED RATE AUTOMATED: CPT | Performed by: HOSPITALIST

## 2019-06-17 PROCEDURE — 63710000001 PREDNISONE PER 1 MG: Performed by: HOSPITALIST

## 2019-06-17 PROCEDURE — 82272 OCCULT BLD FECES 1-3 TESTS: CPT | Performed by: HOSPITALIST

## 2019-06-17 PROCEDURE — 86140 C-REACTIVE PROTEIN: CPT | Performed by: HOSPITALIST

## 2019-06-17 PROCEDURE — 82728 ASSAY OF FERRITIN: CPT | Performed by: HOSPITALIST

## 2019-06-17 PROCEDURE — 83605 ASSAY OF LACTIC ACID: CPT | Performed by: HOSPITALIST

## 2019-06-17 PROCEDURE — 84145 PROCALCITONIN (PCT): CPT | Performed by: HOSPITALIST

## 2019-06-17 PROCEDURE — 85025 COMPLETE CBC W/AUTO DIFF WBC: CPT | Performed by: HOSPITALIST

## 2019-06-17 PROCEDURE — 83540 ASSAY OF IRON: CPT | Performed by: HOSPITALIST

## 2019-06-17 PROCEDURE — 80053 COMPREHEN METABOLIC PANEL: CPT | Performed by: HOSPITALIST

## 2019-06-17 RX ORDER — PREDNISONE 20 MG/1
20 TABLET ORAL
Status: DISCONTINUED | OUTPATIENT
Start: 2019-06-17 | End: 2019-06-18 | Stop reason: HOSPADM

## 2019-06-17 RX ORDER — TAMSULOSIN HYDROCHLORIDE 0.4 MG/1
0.4 CAPSULE ORAL NIGHTLY
Status: DISCONTINUED | OUTPATIENT
Start: 2019-06-17 | End: 2019-06-18 | Stop reason: HOSPADM

## 2019-06-17 RX ADMIN — TAMSULOSIN HYDROCHLORIDE 0.4 MG: 0.4 CAPSULE ORAL at 21:32

## 2019-06-17 RX ADMIN — DOCUSATE SODIUM 100 MG: 100 CAPSULE, LIQUID FILLED ORAL at 08:26

## 2019-06-17 RX ADMIN — PREDNISONE 20 MG: 20 TABLET ORAL at 12:39

## 2019-06-17 RX ADMIN — DOCUSATE SODIUM 100 MG: 100 CAPSULE, LIQUID FILLED ORAL at 21:33

## 2019-06-17 RX ADMIN — AMLODIPINE BESYLATE 5 MG: 5 TABLET ORAL at 08:26

## 2019-06-17 RX ADMIN — HYDROXYCHLOROQUINE SULFATE 200 MG: 200 TABLET, FILM COATED ORAL at 08:26

## 2019-06-17 RX ADMIN — LEVETIRACETAM 500 MG: 500 TABLET, FILM COATED ORAL at 21:32

## 2019-06-17 RX ADMIN — ASPIRIN 162 MG: 325 TABLET ORAL at 08:26

## 2019-06-17 RX ADMIN — HYDROXYCHLOROQUINE SULFATE 200 MG: 200 TABLET, FILM COATED ORAL at 21:33

## 2019-06-17 RX ADMIN — LEVETIRACETAM 500 MG: 500 TABLET, FILM COATED ORAL at 08:26

## 2019-06-18 VITALS
WEIGHT: 146.3 LBS | BODY MASS INDEX: 22.96 KG/M2 | SYSTOLIC BLOOD PRESSURE: 127 MMHG | HEIGHT: 67 IN | OXYGEN SATURATION: 99 % | RESPIRATION RATE: 16 BRPM | DIASTOLIC BLOOD PRESSURE: 77 MMHG | HEART RATE: 81 BPM | TEMPERATURE: 96.8 F

## 2019-06-18 PROBLEM — R09.02 HYPOXIA: Status: ACTIVE | Noted: 2019-06-18

## 2019-06-18 LAB
ALBUMIN SERPL-MCNC: 2.8 G/DL (ref 3.5–5.2)
ALBUMIN/GLOB SERPL: 1.1 G/DL
ALP SERPL-CCNC: 69 U/L (ref 39–117)
ALT SERPL W P-5'-P-CCNC: 76 U/L (ref 1–41)
ANION GAP SERPL CALCULATED.3IONS-SCNC: 11.1 MMOL/L
AST SERPL-CCNC: 47 U/L (ref 1–40)
BACTERIA SPEC AEROBE CULT: NORMAL
BACTERIA SPEC AEROBE CULT: NORMAL
BASOPHILS # BLD AUTO: 0.02 10*3/MM3 (ref 0–0.2)
BASOPHILS NFR BLD AUTO: 0.3 % (ref 0–1.5)
BILIRUB SERPL-MCNC: 0.2 MG/DL (ref 0.2–1.2)
BUN BLD-MCNC: 11 MG/DL (ref 8–23)
BUN/CREAT SERPL: 20 (ref 7–25)
CALCIUM SPEC-SCNC: 8.8 MG/DL (ref 8.6–10.5)
CHLORIDE SERPL-SCNC: 105 MMOL/L (ref 98–107)
CO2 SERPL-SCNC: 23.9 MMOL/L (ref 22–29)
CREAT BLD-MCNC: 0.55 MG/DL (ref 0.76–1.27)
DEPRECATED RDW RBC AUTO: 62.7 FL (ref 37–54)
EOSINOPHIL # BLD AUTO: 0.03 10*3/MM3 (ref 0–0.4)
EOSINOPHIL NFR BLD AUTO: 0.4 % (ref 0.3–6.2)
ERYTHROCYTE [DISTWIDTH] IN BLOOD BY AUTOMATED COUNT: 19.4 % (ref 12.3–15.4)
GFR SERPL CREATININE-BSD FRML MDRD: 143 ML/MIN/1.73
GLOBULIN UR ELPH-MCNC: 2.5 GM/DL
GLUCOSE BLD-MCNC: 132 MG/DL (ref 65–99)
HCT VFR BLD AUTO: 34.6 % (ref 37.5–51)
HGB BLD-MCNC: 11.3 G/DL (ref 13–17.7)
IMM GRANULOCYTES # BLD AUTO: 0.02 10*3/MM3 (ref 0–0.05)
IMM GRANULOCYTES NFR BLD AUTO: 0.3 % (ref 0–0.5)
LYMPHOCYTES # BLD AUTO: 1.06 10*3/MM3 (ref 0.7–3.1)
LYMPHOCYTES NFR BLD AUTO: 15.5 % (ref 19.6–45.3)
MCH RBC QN AUTO: 28.5 PG (ref 26.6–33)
MCHC RBC AUTO-ENTMCNC: 32.7 G/DL (ref 31.5–35.7)
MCV RBC AUTO: 87.4 FL (ref 79–97)
MONOCYTES # BLD AUTO: 0.51 10*3/MM3 (ref 0.1–0.9)
MONOCYTES NFR BLD AUTO: 7.5 % (ref 5–12)
NEUTROPHILS # BLD AUTO: 5.2 10*3/MM3 (ref 1.7–7)
NEUTROPHILS NFR BLD AUTO: 76 % (ref 42.7–76)
NRBC BLD AUTO-RTO: 0 /100 WBC (ref 0–0.2)
PLATELET # BLD AUTO: 188 10*3/MM3 (ref 140–450)
PMV BLD AUTO: 10.8 FL (ref 6–12)
POTASSIUM BLD-SCNC: 3.7 MMOL/L (ref 3.5–5.2)
PROT SERPL-MCNC: 5.3 G/DL (ref 6–8.5)
RBC # BLD AUTO: 3.96 10*6/MM3 (ref 4.14–5.8)
SODIUM BLD-SCNC: 140 MMOL/L (ref 136–145)
WBC NRBC COR # BLD: 6.84 10*3/MM3 (ref 3.4–10.8)

## 2019-06-18 PROCEDURE — 85025 COMPLETE CBC W/AUTO DIFF WBC: CPT | Performed by: HOSPITALIST

## 2019-06-18 PROCEDURE — 63710000001 PREDNISONE PER 1 MG: Performed by: HOSPITALIST

## 2019-06-18 PROCEDURE — 80053 COMPREHEN METABOLIC PANEL: CPT | Performed by: HOSPITALIST

## 2019-06-18 RX ORDER — PREDNISONE 20 MG/1
20 TABLET ORAL
Qty: 4 TABLET | Refills: 0 | Status: SHIPPED | OUTPATIENT
Start: 2019-06-19 | End: 2019-06-22

## 2019-06-18 RX ORDER — TAMSULOSIN HYDROCHLORIDE 0.4 MG/1
0.4 CAPSULE ORAL NIGHTLY
Qty: 30 CAPSULE | Refills: 0 | Status: SHIPPED | OUTPATIENT
Start: 2019-06-18

## 2019-06-18 RX ORDER — PSEUDOEPHEDRINE HCL 30 MG
100 TABLET ORAL 2 TIMES DAILY
Start: 2019-06-18 | End: 2023-02-21

## 2019-06-18 RX ADMIN — AMLODIPINE BESYLATE 5 MG: 5 TABLET ORAL at 08:18

## 2019-06-18 RX ADMIN — ASPIRIN 162 MG: 325 TABLET ORAL at 08:19

## 2019-06-18 RX ADMIN — LEVETIRACETAM 500 MG: 500 TABLET, FILM COATED ORAL at 08:18

## 2019-06-18 RX ADMIN — DOCUSATE SODIUM 100 MG: 100 CAPSULE, LIQUID FILLED ORAL at 08:19

## 2019-06-18 RX ADMIN — HYDROXYCHLOROQUINE SULFATE 200 MG: 200 TABLET, FILM COATED ORAL at 08:18

## 2019-06-18 RX ADMIN — PREDNISONE 20 MG: 20 TABLET ORAL at 08:19

## 2019-06-19 ENCOUNTER — READMISSION MANAGEMENT (OUTPATIENT)
Dept: CALL CENTER | Facility: HOSPITAL | Age: 84
End: 2019-06-19

## 2019-06-19 LAB
BACTERIA SPEC AEROBE CULT: NORMAL
BACTERIA SPEC AEROBE CULT: NORMAL

## 2019-06-20 NOTE — OUTREACH NOTE
Prep Survey      Responses   Facility patient discharged from?  [facility]   Is patient eligible?  Yes   Discharge diagnosis  Drug induced fever    Does the patient have one of the following disease processes/diagnoses(primary or secondary)?  Other   Does the patient have Home health ordered?  Yes   What is the Home health agency?   pt refused   Is there a DME ordered?  Yes   What DME was ordered?  pt refused.   Prep survey completed?  Yes          Mandy Guthrie RN

## 2019-06-21 ENCOUNTER — READMISSION MANAGEMENT (OUTPATIENT)
Dept: CALL CENTER | Facility: HOSPITAL | Age: 84
End: 2019-06-21

## 2019-06-21 NOTE — OUTREACH NOTE
Medical Week 1 Survey      Responses   Facility patient discharged from?  [facility]   Does the patient have one of the following disease processes/diagnoses(primary or secondary)?  Other   Is there a successful TCM telephone encounter documented?  No   Week 1 attempt successful?  Yes   Call start time  1122   Call end time  1125   Medication alerts for this patient  reviewed his medication, prednisone has been decreased   Meds reviewed with patient/caregiver?  Yes   Is the patient having any side effects they believe may be caused by any medication additions or changes?  No   Is the patient taking all medications as directed (includes completed medication regime)?  Yes   Comments regarding appointments  has seen doctor yesterday   Does the patient have a primary care provider?   Yes   What is the Home health agency?   does PT at [facility]   What DME was ordered?  patient did not get his CPAP will be getting a sleep study   Did the patient receive a copy of their discharge instructions?  Yes   Nursing interventions  Reviewed instructions with patient   What is the patient's perception of their health status since discharge?  Improving   Is the patient/caregiver able to teach back signs and symptoms related to disease process for when to call PCP?  Yes   Is the patient/caregiver able to teach back signs and symptoms related to disease process for when to call 911?  Yes   Is the patient/caregiver able to teach back the hierarchy of who to call/visit for symptoms/problems? PCP, Specialist, Home health nurse, Urgent Care, ED, 911  Yes   Week 1 call completed?  Yes   Wrap up additional comments   awaiting for a sleep study order          Anayeli Peoples RN

## 2019-06-28 ENCOUNTER — READMISSION MANAGEMENT (OUTPATIENT)
Dept: CALL CENTER | Facility: HOSPITAL | Age: 84
End: 2019-06-28

## 2019-06-28 NOTE — OUTREACH NOTE
Medical Week 2 Survey      Responses   Facility patient discharged from?  [facility]   Does the patient have one of the following disease processes/diagnoses(primary or secondary)?  Other   Week 2 attempt successful?  No   Unsuccessful attempts  Attempt 1          Flores Christianson RN

## 2019-07-01 ENCOUNTER — READMISSION MANAGEMENT (OUTPATIENT)
Dept: CALL CENTER | Facility: HOSPITAL | Age: 84
End: 2019-07-01

## 2019-07-01 NOTE — OUTREACH NOTE
Medical Week 2 Survey      Responses   Facility patient discharged from?  [facility]   Does the patient have one of the following disease processes/diagnoses(primary or secondary)?  Other   Week 2 attempt successful?  Yes   Call start time  1631   Discharge diagnosis  Drug induced fever    Call end time  1635   Meds reviewed with patient/caregiver?  Yes   Is the patient having any side effects they believe may be caused by any medication additions or changes?  No   Does the patient have all medications ordered at discharge?  Yes   Does the patient have an appointment with their PCP within 7 days of discharge?  Yes   Has the patient kept scheduled appointments due by today?  Yes   Psychosocial issues?  No   Did the patient receive a copy of their discharge instructions?  Yes   What is the patient's perception of their health status since discharge?  Improving   Is the patient/caregiver able to teach back signs and symptoms related to disease process for when to call PCP?  Yes   Is the patient/caregiver able to teach back signs and symptoms related to disease process for when to call 911?  Yes   Is the patient/caregiver able to teach back the hierarchy of who to call/visit for symptoms/problems? PCP, Specialist, Home health nurse, Urgent Care, ED, 911  Yes   Week 2 Call Completed?  Yes          Magda Rosa RN

## 2019-07-10 ENCOUNTER — READMISSION MANAGEMENT (OUTPATIENT)
Dept: CALL CENTER | Facility: HOSPITAL | Age: 84
End: 2019-07-10

## 2019-07-10 NOTE — OUTREACH NOTE
Medical Week 3 Survey      Responses   Facility patient discharged from?  [facility]   Does the patient have one of the following disease processes/diagnoses(primary or secondary)?  Other   Week 3 attempt successful?  No   Unsuccessful attempts  Attempt 1          Grady Sky RN

## 2019-07-11 ENCOUNTER — READMISSION MANAGEMENT (OUTPATIENT)
Dept: CALL CENTER | Facility: HOSPITAL | Age: 84
End: 2019-07-11

## 2019-07-11 NOTE — OUTREACH NOTE
Medical Week 3 Survey      Responses   Facility patient discharged from?  [facility]   Does the patient have one of the following disease processes/diagnoses(primary or secondary)?  Other   Week 3 attempt successful?  Yes   Call start time  1503   Call end time  1510   Discharge diagnosis  Drug induced fever    Is patient permission given to speak with other caregiver?  No   Meds reviewed with patient/caregiver?  Yes   Is the patient taking all medications as directed (includes completed medication regime)?  Yes   Does the patient have a primary care provider?   Yes   Comments regarding PCP  Tej Cedillo Jr., MD    Has the patient kept scheduled appointments due by today?  Yes   Comments  Reviewed scheduled appts with patient.    What is the Home health agency?   No HH. Patient goes to outpt PT.    Psychosocial issues?  No   Did the patient receive a copy of their discharge instructions?  Yes   What is the patient's perception of their health status since discharge?  Improving   Is the patient/caregiver able to teach back signs and symptoms related to disease process for when to call PCP?  Yes   Is the patient/caregiver able to teach back the hierarchy of who to call/visit for symptoms/problems? PCP, Specialist, Home health nurse, Urgent Care, ED, 911  Yes   Week 3 Call Completed?  Yes          Abbie Delacruz RN

## 2019-07-15 ENCOUNTER — TRANSCRIBE ORDERS (OUTPATIENT)
Dept: SLEEP MEDICINE | Facility: HOSPITAL | Age: 84
End: 2019-07-15

## 2019-07-15 ENCOUNTER — OFFICE VISIT (OUTPATIENT)
Dept: SLEEP MEDICINE | Facility: HOSPITAL | Age: 84
End: 2019-07-15

## 2019-07-15 VITALS
OXYGEN SATURATION: 96 % | SYSTOLIC BLOOD PRESSURE: 139 MMHG | BODY MASS INDEX: 23.19 KG/M2 | WEIGHT: 153 LBS | DIASTOLIC BLOOD PRESSURE: 70 MMHG | HEART RATE: 79 BPM | HEIGHT: 68 IN

## 2019-07-15 DIAGNOSIS — G47.10 HYPERSOMNIA: Primary | ICD-10-CM

## 2019-07-15 DIAGNOSIS — R06.83 SNORING: Primary | ICD-10-CM

## 2019-07-15 DIAGNOSIS — G47.10 HYPERSOMNIA: ICD-10-CM

## 2019-07-15 PROCEDURE — G0463 HOSPITAL OUTPT CLINIC VISIT: HCPCS

## 2019-07-15 NOTE — PROGRESS NOTES
Group: [facility]         CONSULT NOTE    Patient Identification:  Juan C Ca  83 y.o.  male  1935  [id number]            Requesting physician: Dr. Cedillo    Reason for Consultation: Nocturnal desaturation in the hospital    CC:     History of Present Illness:  Very pleasant 83-year-old gentleman history of rheumatoid arthritis seizure disorder recently admitted to [facility] with d drug fever.  I have reviewed hospital records.  Apparently patient was noted to have oxygen desaturation in his sleep and had an overnight oximetry which confirmed overnight desaturation.  He has been a long-term smoker but never been diagnosed with COPD as such.  He reports snoring with possible witnessed apnea.  Reports increased arousals at night.  He uses the restroom many times but could be related to his prostate.  No alcohol abuse.  Currently on steroid taper and feels weak and tired.  He does have sleep talking but no sleepwalking.  No restless leg symptoms reported.  Goes to bed 10 PM gets up 8:30 in the morning.  Gets about 8 to 9 hours of sleep and still feels tired.  No night shift work.  He is currently retired.  Also reports waking up with a dry mouth in the morning.  Reports difficulty staying asleep at night.      Review of Systems  Kidney bladder positive for frequent urination  HEENT positive for persistent hoarseness and nasal congestion  Musculoskeletal positive for swollen joints painful joints  Also positive for postnasal drainage persistent hoarseness  General negative for fever  Cardio pulmonary positive for cough and shortness of breath  Neuro negative for anxiety depression  GI positive for problem swallowing  Skin positive for rash and changes in nail  Endocrine positive for always feeling too cold  Heme lymphatic negative for swollen glands  Rest of the 12 point review of system negative  Story 9 out of 24 within normal limits  Past Medical History:  Past Medical History:    Diagnosis Date   • Cancer (CMS/HCC)     skin cancer   • CVD (cerebrovascular disease)     with remote infarcts per CT of head.   • H/O Iron deficiency anemia    • H/O Traumatic brain injury (CMS/HCC)    • H/O: pneumonia 2015   • Hyperlipidemia    • Hypertension    • Rheumatoid arthritis (CMS/HCC)    • Seizures (CMS/HCC)     Started in 1960s.       Past Surgical History:  Past Surgical History:   Procedure Laterality Date   • APPENDECTOMY     • CATARACT EXTRACTION     • COLONOSCOPY N/A 2017    Procedure: COLONOSCOPY TO CECUM AND TI WITH APC CAUTERY OF RIGHT COLON AVM ;  Surgeon: Lucio Stein MD;  Location: [facility] ENDOSCOPY;  Service:    • ENDOSCOPY N/A 2017    Procedure: ESOPHAGOGASTRODUODENOSCOPY with BX ;  Surgeon: Lucio Stein MD;  Location: [facility] ENDOSCOPY;  Service:         Home Meds:    (Not in a hospital admission)    Allergies:  No Known Allergies    Social History:   Social History     Socioeconomic History   • Marital status:      Spouse name: Abbie   • Number of children: Not on file   • Years of education: College   • Highest education level: Not on file   Occupational History     Employer: RETIRED   Tobacco Use   • Smoking status: Former Smoker     Years: 15.00     Types: Cigarettes     Last attempt to quit: 1987     Years since quittin.6   • Smokeless tobacco: Never Used   Substance and Sexual Activity   • Alcohol use: No   • Drug use: No   • Sexual activity: Defer       Family History:  Family History   Problem Relation Age of Onset   • Colon cancer Other    • Hypertension Mother    • Mental illness Mother    • Hypertension Father    • Heart disease Sister    • Hypertension Sister    • Colon cancer Sister    • Skin cancer Sister    • Heart disease Brother    • Hypertension Brother    • Lung cancer Brother    • Diabetes Daughter    • Throat cancer Brother    • Cancer Brother         Bladder   • Prostate cancer Brother        Physical Exam:  /70   Pulse  "79   Ht 172.7 cm (68\")   Wt 69.4 kg (153 lb)   SpO2 96%   BMI 23.26 kg/m²  Body mass index is 23.26 kg/m². 96% 69.4 kg (153 lb)  Physical Exam  Elderly gentleman slightly weak appearing normal body habitus  Eyes normal conjunctive a pupils reactive to light  ENT Mallampati between 3 and 4 with normal nasal exam  Neck no thyromegaly midline trachea  Chest clear no labored breathing  CVs regular rate and rhythm no lower extremity edema  Abdomen soft nontender no hepatosplenomegaly  Musculoskeletal no cyanosis no clubbing normal range of motion  Psychiatry oriented to time place and person normal memory  CNS intact normal sensory exam  Skin no rashes no nodules    LABS:  Lab Results   Component Value Date    CALCIUM 8.8 06/18/2019       No results found for: CKTOTAL, CKMB, CKMBINDEX, TROPONINI, TROPONINT                              Lab Results   Component Value Date    TSH 2.82 05/13/2015     Estimated Creatinine Clearance: 71.1 mL/min (A) (by C-G formula based on SCr of 0.55 mg/dL (L)).         Imaging: I personally visualized the images of scans/x-rays performed within last 3 days.      Assessment:  Nocturnal desaturation with snoring and hypersomnia  Rheumatoid arthritis  Recent drug fever  Seizure disorder  History of tobacco abuse      Recommendations:  At this point we have gentleman with nocturnal desaturation noted during hospitalization  Educated patient on CRISTAL  Sleep disordered breathing needs to be considered and ruled out.  Discussed pathophysiology consequences of untreated sleep apnea.  Patient agreeable wishing to proceed for a split-night sleep study.  Sleep hygiene measures discussed in detail.  If patient is negative for CRISTAL consider further investigation to rule out any lung disease causing nocturnal desaturation  After discussion with the patient he is comfortable doing an in lab sleep study.  Follow-up after sleep study to make further recommendations            Jana Sayied, " MD  7/15/2019  11:23 AM      Much of this encounter note is an electronic transcription/translation of spoken language to printed text using Dragon Software.

## 2019-07-19 ENCOUNTER — READMISSION MANAGEMENT (OUTPATIENT)
Dept: CALL CENTER | Facility: HOSPITAL | Age: 84
End: 2019-07-19

## 2019-07-19 NOTE — OUTREACH NOTE
Medical Week 4 Survey      Responses   Facility patient discharged from?  [facility]   Does the patient have one of the following disease processes/diagnoses(primary or secondary)?  Other   Week 4 attempt successful?  Yes   Call start time  0927   Call end time  0928   Discharge diagnosis  Drug induced fever    Meds reviewed with patient/caregiver?  Yes   Is the patient taking all medications as directed (includes completed medication regime)?  Yes   Has the patient kept scheduled appointments due by today?  Yes   What is the patient's perception of their health status since discharge?  Improving   Is the patient/caregiver able to teach back the hierarchy of who to call/visit for symptoms/problems? PCP, Specialist, Home health nurse, Urgent Care, ED, 911  Yes   Week 4 Call Completed?  Yes   Would the patient like one additional call?  No   Graduated  Yes   Did the patient feel the follow up calls were helpful during their recovery period?  Yes   Was the number of calls appropriate?  Yes   Wrap up additional comments  Pt says he is doing really good, rushed phone call.          Tita Rios RN

## 2019-09-04 ENCOUNTER — HOSPITAL ENCOUNTER (OUTPATIENT)
Dept: SLEEP MEDICINE | Facility: HOSPITAL | Age: 84
Discharge: HOME | End: 2019-09-04
Admitting: INTERNAL MEDICINE

## 2019-09-04 DIAGNOSIS — R06.83 SNORING: ICD-10-CM

## 2019-09-04 DIAGNOSIS — G47.10 HYPERSOMNIA: ICD-10-CM

## 2019-09-04 PROCEDURE — 95811 POLYSOM 6/>YRS CPAP 4/> PARM: CPT

## 2019-09-23 ENCOUNTER — LAB (OUTPATIENT)
Dept: LAB | Facility: HOSPITAL | Age: 84
End: 2019-09-23

## 2019-09-23 ENCOUNTER — OFFICE VISIT (OUTPATIENT)
Dept: ONCOLOGY | Facility: CLINIC | Age: 84
End: 2019-09-23

## 2019-09-23 VITALS
SYSTOLIC BLOOD PRESSURE: 135 MMHG | HEART RATE: 76 BPM | OXYGEN SATURATION: 93 % | WEIGHT: 158.4 LBS | HEIGHT: 68 IN | RESPIRATION RATE: 16 BRPM | BODY MASS INDEX: 24.01 KG/M2 | TEMPERATURE: 98.1 F | DIASTOLIC BLOOD PRESSURE: 69 MMHG

## 2019-09-23 DIAGNOSIS — D50.9 IRON DEFICIENCY ANEMIA, UNSPECIFIED IRON DEFICIENCY ANEMIA TYPE: Primary | ICD-10-CM

## 2019-09-23 DIAGNOSIS — D50.9 IRON DEFICIENCY ANEMIA, UNSPECIFIED IRON DEFICIENCY ANEMIA TYPE: ICD-10-CM

## 2019-09-23 DIAGNOSIS — M06.9 RHEUMATOID ARTHRITIS, INVOLVING UNSPECIFIED SITE, UNSPECIFIED RHEUMATOID FACTOR PRESENCE: ICD-10-CM

## 2019-09-23 LAB
ALBUMIN SERPL-MCNC: 3.8 G/DL (ref 3.5–5.2)
ALBUMIN/GLOB SERPL: 1.3 G/DL (ref 1.1–2.4)
ALP SERPL-CCNC: 71 U/L (ref 38–116)
ALT SERPL W P-5'-P-CCNC: 15 U/L (ref 0–41)
ANION GAP SERPL CALCULATED.3IONS-SCNC: 13.3 MMOL/L (ref 5–15)
AST SERPL-CCNC: 17 U/L (ref 0–40)
BASOPHILS # BLD AUTO: 0.04 10*3/MM3 (ref 0–0.2)
BASOPHILS NFR BLD AUTO: 0.4 % (ref 0–1.5)
BILIRUB SERPL-MCNC: 0.4 MG/DL (ref 0.2–1.2)
BUN BLD-MCNC: 18 MG/DL (ref 6–20)
BUN/CREAT SERPL: 17.5 (ref 7.3–30)
CALCIUM SPEC-SCNC: 9.7 MG/DL (ref 8.5–10.2)
CHLORIDE SERPL-SCNC: 99 MMOL/L (ref 98–107)
CO2 SERPL-SCNC: 27.7 MMOL/L (ref 22–29)
CREAT BLD-MCNC: 1.03 MG/DL (ref 0.7–1.3)
DEPRECATED RDW RBC AUTO: 44.3 FL (ref 37–54)
EOSINOPHIL # BLD AUTO: 0.13 10*3/MM3 (ref 0–0.4)
EOSINOPHIL NFR BLD AUTO: 1.3 % (ref 0.3–6.2)
ERYTHROCYTE [DISTWIDTH] IN BLOOD BY AUTOMATED COUNT: 12.3 % (ref 12.3–15.4)
FERRITIN SERPL-MCNC: 571.4 NG/ML (ref 30–400)
GFR SERPL CREATININE-BSD FRML MDRD: 69 ML/MIN/1.73
GLOBULIN UR ELPH-MCNC: 2.9 GM/DL (ref 1.8–3.5)
GLUCOSE BLD-MCNC: 75 MG/DL (ref 74–124)
HCT VFR BLD AUTO: 42.5 % (ref 37.5–51)
HGB BLD-MCNC: 13.7 G/DL (ref 13–17.7)
IMM GRANULOCYTES # BLD AUTO: 0.03 10*3/MM3 (ref 0–0.05)
IMM GRANULOCYTES NFR BLD AUTO: 0.3 % (ref 0–0.5)
IRON 24H UR-MRATE: 57 MCG/DL (ref 59–158)
IRON SATN MFR SERPL: 20 % (ref 14–48)
LYMPHOCYTES # BLD AUTO: 1.41 10*3/MM3 (ref 0.7–3.1)
LYMPHOCYTES NFR BLD AUTO: 14.3 % (ref 19.6–45.3)
MCH RBC QN AUTO: 31.7 PG (ref 26.6–33)
MCHC RBC AUTO-ENTMCNC: 32.2 G/DL (ref 31.5–35.7)
MCV RBC AUTO: 98.4 FL (ref 79–97)
MONOCYTES # BLD AUTO: 1.02 10*3/MM3 (ref 0.1–0.9)
MONOCYTES NFR BLD AUTO: 10.4 % (ref 5–12)
NEUTROPHILS # BLD AUTO: 7.21 10*3/MM3 (ref 1.7–7)
NEUTROPHILS NFR BLD AUTO: 73.3 % (ref 42.7–76)
NRBC BLD AUTO-RTO: 0 /100 WBC (ref 0–0.2)
PLATELET # BLD AUTO: 233 10*3/MM3 (ref 140–450)
PMV BLD AUTO: 9.2 FL (ref 6–12)
POTASSIUM BLD-SCNC: 4.5 MMOL/L (ref 3.5–4.7)
PROT SERPL-MCNC: 6.7 G/DL (ref 6.3–8)
RBC # BLD AUTO: 4.32 10*6/MM3 (ref 4.14–5.8)
SODIUM BLD-SCNC: 140 MMOL/L (ref 134–145)
TIBC SERPL-MCNC: 283 MCG/DL (ref 249–505)
TRANSFERRIN SERPL-MCNC: 202 MG/DL (ref 200–360)
WBC NRBC COR # BLD: 9.84 10*3/MM3 (ref 3.4–10.8)

## 2019-09-23 PROCEDURE — 80053 COMPREHEN METABOLIC PANEL: CPT | Performed by: INTERNAL MEDICINE

## 2019-09-23 PROCEDURE — G0463 HOSPITAL OUTPT CLINIC VISIT: HCPCS | Performed by: INTERNAL MEDICINE

## 2019-09-23 PROCEDURE — 36415 COLL VENOUS BLD VENIPUNCTURE: CPT | Performed by: INTERNAL MEDICINE

## 2019-09-23 PROCEDURE — 83540 ASSAY OF IRON: CPT | Performed by: INTERNAL MEDICINE

## 2019-09-23 PROCEDURE — 84466 ASSAY OF TRANSFERRIN: CPT | Performed by: INTERNAL MEDICINE

## 2019-09-23 PROCEDURE — 82728 ASSAY OF FERRITIN: CPT | Performed by: INTERNAL MEDICINE

## 2019-09-23 PROCEDURE — 99213 OFFICE O/P EST LOW 20 MIN: CPT | Performed by: INTERNAL MEDICINE

## 2019-09-23 PROCEDURE — 85025 COMPLETE CBC W/AUTO DIFF WBC: CPT | Performed by: INTERNAL MEDICINE

## 2019-09-23 RX ORDER — PREDNISONE 1 MG/1
1 TABLET ORAL DAILY
COMMUNITY
Start: 2019-07-30 | End: 2020-11-23

## 2019-09-23 NOTE — PROGRESS NOTES
REASON FOR FOLLOW-UP: Iron deficiency anemia    HISTORY OF PRESENT ILLNESS:  The patient is a 83 y.o. year old male who is here for an opinion about the above issue.    History of Present Illness    The patient returns to the office today for scheduled follow-up in 1 month lab review.      Overall, the patient reports feeling well.      Labs from 09/23/19 look normal.      He had been hospitalized for a fever.  He has rheumatid arthritis and takes steroids for that.  Patient is status post Injectafer treatment in the past.    Past Medical History:   Diagnosis Date   • Cancer (CMS/HCC)     skin cancer   • CVD (cerebrovascular disease)     with remote infarcts per CT of head.   • H/O Iron deficiency anemia    • H/O Traumatic brain injury (CMS/HCC)    • H/O: pneumonia 04/2015   • Hyperlipidemia    • Hypertension    • Rheumatoid arthritis (CMS/HCC)    • Seizures (CMS/HCC)     Started in 1960s.        Past Surgical History:   Procedure Laterality Date   • APPENDECTOMY  1950   • CATARACT EXTRACTION     • COLONOSCOPY N/A 12/12/2017    Procedure: COLONOSCOPY TO CECUM AND TI WITH APC CAUTERY OF RIGHT COLON AVM ;  Surgeon: Lucio Stein MD;  Location: [facility] ENDOSCOPY;  Service:    • ENDOSCOPY N/A 12/12/2017    Procedure: ESOPHAGOGASTRODUODENOSCOPY with BX ;  Surgeon: Lucio Stein MD;  Location: [facility] ENDOSCOPY;  Service:         Current Outpatient Medications:   •  amLODIPine (NORVASC) 5 MG tablet, Take 5 mg by mouth Daily., Disp: , Rfl:   •  Cholecalciferol (VITAMIN D PO), Take 50,000 mg by mouth Every 14 (Fourteen) Days., Disp: , Rfl:   •  docusate sodium 100 MG capsule, Take 100 mg by mouth 2 (Two) Times a Day. (Patient taking differently: Take 100 mg by mouth Daily.), Disp: , Rfl:   •  hydroxychloroquine (PLAQUENIL) 200 MG tablet, Take 200 mg by mouth 2 (Two) Times a Day., Disp: , Rfl:   •  levETIRAcetam (KEPPRA) 500 MG tablet, Take 500 mg by mouth 2 (Two) Times a Day., Disp: , Rfl:   •  Multiple Vitamin (MULTI  VITAMIN MENS PO), Take 1 tablet by mouth Daily., Disp: , Rfl:   •  predniSONE (DELTASONE) 1 MG tablet, 1 mg Daily., Disp: , Rfl:   •  tamsulosin (FLOMAX) 0.4 MG capsule 24 hr capsule, Take 1 capsule by mouth every night., Disp: 30 capsule, Rfl: 0  •  aspirin 325 MG tablet, Take 81 mg by mouth Daily., Disp: , Rfl:     ALLERGIES:  No Known Allergies     Social History     Socioeconomic History   • Marital status:      Spouse name: Abbie   • Number of children: Not on file   • Years of education: College   • Highest education level: Not on file   Occupational History     Employer: RETIRED   Tobacco Use   • Smoking status: Former Smoker     Years: 15.00     Types: Cigarettes     Last attempt to quit: 1987     Years since quittin.8   • Smokeless tobacco: Never Used   Substance and Sexual Activity   • Alcohol use: No   • Drug use: No   • Sexual activity: Defer        Family History   Problem Relation Age of Onset   • Colon cancer Other    • Hypertension Mother    • Mental illness Mother    • Hypertension Father    • Heart disease Sister    • Hypertension Sister    • Colon cancer Sister    • Skin cancer Sister    • Heart disease Brother    • Hypertension Brother    • Lung cancer Brother    • Diabetes Daughter    • Throat cancer Brother    • Cancer Brother         Bladder   • Prostate cancer Brother       I have reviewed the patient's medical history in detail and updated the computerized patient record.    Review of Systems   Constitutional: Positive for fatigue. Negative for appetite change, chills, diaphoresis, fever and unexpected weight change.   HENT: Negative for hearing loss, sore throat and trouble swallowing.    Respiratory: Positive for shortness of breath. Negative for cough, chest tightness and wheezing.    Cardiovascular: Negative for chest pain, palpitations and leg swelling.   Gastrointestinal: Negative for abdominal distention, abdominal pain, constipation, diarrhea, nausea and vomiting.  "  Genitourinary: Negative for dysuria, frequency, hematuria and urgency.   Musculoskeletal: Positive for arthralgias and back pain. Negative for joint swelling.        No muscle weakness.   Skin: Negative for rash and wound.   Neurological: Positive for weakness. Negative for seizures, syncope, speech difficulty, numbness and headaches.   Hematological: Negative for adenopathy. Does not bruise/bleed easily.   Psychiatric/Behavioral: Negative for behavioral problems, confusion and suicidal ideas.       Objective     Vitals:    09/23/19 1335   BP: 135/69   Pulse: 76   Resp: 16   Temp: 98.1 °F (36.7 °C)   TempSrc: Oral   SpO2: 93%   Weight: 71.8 kg (158 lb 6.4 oz)   Height: 172.7 cm (67.99\")   PainSc: 0-No pain     Current Status 9/23/2019   ECOG score 0         GENERAL:  Well-developed, well-nourished in no acute distress.   SKIN:  Warm, dry without rashes, purpura or petechiae.  NECK:  Supple with good range of motion; no thyromegaly or masses, no JVD.  LYMPHATICS:  No cervical, supraclavicular, axillary or inguinal adenopathy.  CHEST:  Lungs clear to auscultation. Good airflow.  CARDIAC:  Regular rate and rhythm without murmurs, rubs or gallops. Normal S1,S2.  ABDOMEN:  Soft, nontender with no hepatosplenomegaly or masses.  EXTREMITIES:  No clubbing, cyanosis or edema.  NEUROLOGICAL:  Cranial Nerves II-XII grossly intact.  No focal neurological deficits.  PSYCHIATRIC:  Normal affect and mood.        RECENT LABS:  Results from last 7 days   Lab Units 09/23/19  1340   WBC 10*3/mm3 9.84   NEUTROS ABS 10*3/mm3 7.21*   HEMOGLOBIN g/dL 13.7   HEMATOCRIT % 42.5   PLATELETS 10*3/mm3 233              RECENT IMAGING:  Xray Chest - 06/13/19  FINDINGS:  Dense opacification overlying the bilateral lung apices is present, as  before, likely represents partially calcified pleural plaques, best seen  on recent CT. There is no new pulmonary consolidation. There is no  pleural effusion. No pneumothorax is seen. The heart is normal in " size.     IMPRESSION:  No findings of acute cardiopulmonary pathology. Probable pleural plaques  overlying the bilateral apices, as before.    CT Head - 06/13/19  FINDINGS: There is mild-to-moderate diffuse atrophy. There is a  moderately large confluent area of low attenuation in the left  frontotemporal region consistent with encephalomalacia likely from an  old infarction. This is also present on the previous study of  04/14/2016.     There is no evidence of acute infarction, midline shift, mass effect or  hemorrhage.     No bony abnormalities are seen.     IMPRESSION:  No acute process identified.    CT Chest:  FINDINGS: The lungs are hyperexpanded suggesting COPD. Apical fibrotic  changes and pleural-based calcifications are noted, likely  postinfectious in nature. There are calcified residua of granulomatous  disease present. No active disease, edema, or effusion. Normal heart  size. Aorta nonaneurysmal. Regional osseous structures intact.     Unenhanced images of the included upper abdomen partially visualizes a  suspected left renal cyst which appears to have been present on the  previous exam but is only partially imaged           IMPRESSION:  1. Mild chronic changes as discussed, otherwise unremarkable noncontrast  exam.  2. Probable left renal cyst, partially imaged. Outpatient follow-up  Suggested    CT Abdomen  TECHNIQUE/FINDINGS: The CT scan was performed through the abdomen and  pelvis without contrast and compared to the previous CT scans of the  abdomen and pelvis dated 05/15/2015. The following findings are present:  1. There are small bilateral pleural effusions layering posteriorly and  these are new since the chest CT scan performed just 2 days ago. The  liver, spleen, pancreas and both adrenal glands are unremarkable without  intravenous contrast. The gallbladder shows no gallstones or wall  thickening.  2. There are several small cortical cysts involving the kidneys. A cyst  in the anterior  left kidney measures up to 2 cm and shows minimal  enlargement since 05/15/2015 when it measured 1.5 cm. There is no renal  obstruction.  3. There is no aortic aneurysm or retroperitoneal lymphadenopathy. The  large and small bowel loops are normal in caliber and show no  inflammatory change. In the pelvis there is a moderate amount of dense  stool in the rectosigmoid colon. The urinary bladder has a smooth  contour and there is mild enlargement of the prostate measuring 5.7 cm  which is unchanged.      Assessment/Plan   1.  Iron deficiency anemia: Patient was seen by Dr. Cedillo 2019 he was found to be iron deficient.  He denied any active GI bleeding.  He has been started on ferrous sulfate 325 mg 1 p.o. daily but without adequate control.  B12, folate, serum protein electrophoresis and flow cytometry negative.  Ferrous sulfate increased to twice daily 2019 though this resulted in abdominal cramping and constipation.    The patient remains iron deficient today with a decreased hemoglobin at 11.7, iron saturation of 10%.  We will proceed with Injectafer x2.    · The patient has previously been seen by Dr. Rachel Stein 2017 with EGD and colonoscopy.  EGD showed grade B esophagitis, chronic gastritis.  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  ·   Due to recurrent iron deficiency, we will ask patient to follow-up with Dr. Stein .  To evaluate for colon polyp or colon colon cancer given his age.  His sister got recently diagnosed with colon cancer at age 90 and has had surgery.    2.  History of severe rheumatoid arthritis for which he is on Plaquenil.  His rheumatologist wants to start methotrexate but he is very concerned about starting methotrexate.    3.  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    4.  Family history of lung cancer in a brother in his 80s and he  at  "88 with lung cancer.  Second brother had throat cancer in the 70s and  at 80 with \"cancer.  Third brother had bladder cancer and fourth brother had prostate cancer he had a sister with colon cancer.    Plan  1. Refer to GI for colonoscopy in 4 weeks.  For possible EGD colonoscopy for history of iron deficiency anemia  2. Return in 9 weeks with [facility] to review colonoscopy results with CBC and iron profile    Rasheeda Novoa MD    "

## 2019-09-25 ENCOUNTER — TELEPHONE (OUTPATIENT)
Dept: SLEEP MEDICINE | Facility: HOSPITAL | Age: 84
End: 2019-09-25

## 2019-09-25 NOTE — TELEPHONE ENCOUNTER
Spoke with patient aboutsleep study results, sending orders to [facility] (per patient request), follow up scheduled 11/18/19

## 2019-10-11 ENCOUNTER — OFFICE (OUTPATIENT)
Dept: URBAN - METROPOLITAN AREA CLINIC 66 | Facility: CLINIC | Age: 84
End: 2019-10-11

## 2019-10-11 VITALS
HEART RATE: 79 BPM | HEIGHT: 68 IN | WEIGHT: 157 LBS | DIASTOLIC BLOOD PRESSURE: 72 MMHG | SYSTOLIC BLOOD PRESSURE: 114 MMHG

## 2019-10-11 DIAGNOSIS — D50.9 IRON DEFICIENCY ANEMIA, UNSPECIFIED: ICD-10-CM

## 2019-10-11 DIAGNOSIS — Z80.0 FAMILY HISTORY OF MALIGNANT NEOPLASM OF DIGESTIVE ORGANS: ICD-10-CM

## 2019-10-11 PROCEDURE — 99214 OFFICE O/P EST MOD 30 MIN: CPT | Performed by: NURSE PRACTITIONER

## 2019-10-11 NOTE — SERVICEHPINOTES
82 y/o male referred by Dr Aleman for evaluation of PHILLIP. He is accompanied by his daughter. He has a PMH of PHILLIP, Gerd, htn/hld, RA, (Dr Crook) RIKKI, skin cancer (follows with derm), seizure d/o from traumatic brain injury. FH of colon cancer in his sister in her 70's.He denies any change in his bowel habits, abdominal pain, unintentional weight loss, or overt GI blood loss in the form of melena or hematochezia. Occasional constipation but nothing outside his normal. Marina has been on oral iron but unable to tolerate twice daily dosing due to constipation and abdominal cramping. Did receive iron infusions in May.BRHad occult stools in July reportedly negative..Appetite and weight are stable.  BREGD/Colonoscopy 12/2017- (Also done at that time for PHILLIP) esophagitis, gastritis, nlm duodenum. Bx - for HP/CD/or BE.BRA single medium sized angioectasia w/o bleeding ablated with APC. No polyps. (bowel prep was excellent)Labs reviewed:BR6/17/19 H/H 11.4/35.1 MCV 89.3BR9/23/19 H/H 13.7/42.5 MCV 98.46/17/19 Fe studies Iron 48, ferritin 2,330 (must be acute phase reactant), FE sat 26%, Transferrin 124, TIBC 1859/23/19 Fe studies Iron 57, ferritin 571, Iron sat 20%, Transferrin 202, TIBC 283BRFOBT 6/2019- Negative

## 2019-11-18 ENCOUNTER — OFFICE VISIT (OUTPATIENT)
Dept: ONCOLOGY | Facility: CLINIC | Age: 84
End: 2019-11-18

## 2019-11-18 ENCOUNTER — LAB (OUTPATIENT)
Dept: LAB | Facility: HOSPITAL | Age: 84
End: 2019-11-18

## 2019-11-18 ENCOUNTER — OFFICE VISIT (OUTPATIENT)
Dept: SLEEP MEDICINE | Facility: HOSPITAL | Age: 84
End: 2019-11-18

## 2019-11-18 VITALS
DIASTOLIC BLOOD PRESSURE: 65 MMHG | TEMPERATURE: 99 F | WEIGHT: 159 LBS | SYSTOLIC BLOOD PRESSURE: 126 MMHG | HEART RATE: 73 BPM | BODY MASS INDEX: 24.1 KG/M2 | RESPIRATION RATE: 16 BRPM | HEIGHT: 68 IN | OXYGEN SATURATION: 97 %

## 2019-11-18 VITALS
OXYGEN SATURATION: 94 % | HEART RATE: 83 BPM | SYSTOLIC BLOOD PRESSURE: 143 MMHG | BODY MASS INDEX: 24.25 KG/M2 | DIASTOLIC BLOOD PRESSURE: 75 MMHG | HEIGHT: 68 IN | WEIGHT: 160 LBS

## 2019-11-18 DIAGNOSIS — D50.9 IRON DEFICIENCY ANEMIA, UNSPECIFIED IRON DEFICIENCY ANEMIA TYPE: Primary | ICD-10-CM

## 2019-11-18 DIAGNOSIS — M06.9 RHEUMATOID ARTHRITIS, INVOLVING UNSPECIFIED SITE, UNSPECIFIED RHEUMATOID FACTOR PRESENCE: ICD-10-CM

## 2019-11-18 DIAGNOSIS — G47.33 OSA ON CPAP: Primary | ICD-10-CM

## 2019-11-18 DIAGNOSIS — Z99.89 OSA ON CPAP: Primary | ICD-10-CM

## 2019-11-18 DIAGNOSIS — D50.9 IRON DEFICIENCY ANEMIA, UNSPECIFIED IRON DEFICIENCY ANEMIA TYPE: ICD-10-CM

## 2019-11-18 LAB
BASOPHILS # BLD AUTO: 0.06 10*3/MM3 (ref 0–0.2)
BASOPHILS NFR BLD AUTO: 0.5 % (ref 0–1.5)
DEPRECATED RDW RBC AUTO: 42.3 FL (ref 37–54)
EOSINOPHIL # BLD AUTO: 0.34 10*3/MM3 (ref 0–0.4)
EOSINOPHIL NFR BLD AUTO: 3.1 % (ref 0.3–6.2)
ERYTHROCYTE [DISTWIDTH] IN BLOOD BY AUTOMATED COUNT: 12.1 % (ref 12.3–15.4)
HCT VFR BLD AUTO: 39 % (ref 37.5–51)
HGB BLD-MCNC: 12.8 G/DL (ref 13–17.7)
IMM GRANULOCYTES # BLD AUTO: 0.04 10*3/MM3 (ref 0–0.05)
IMM GRANULOCYTES NFR BLD AUTO: 0.4 % (ref 0–0.5)
IRON 24H UR-MRATE: 31 MCG/DL (ref 59–158)
IRON SATN MFR SERPL: 12 % (ref 14–48)
LYMPHOCYTES # BLD AUTO: 1.14 10*3/MM3 (ref 0.7–3.1)
LYMPHOCYTES NFR BLD AUTO: 10.3 % (ref 19.6–45.3)
MCH RBC QN AUTO: 31.1 PG (ref 26.6–33)
MCHC RBC AUTO-ENTMCNC: 32.8 G/DL (ref 31.5–35.7)
MCV RBC AUTO: 94.9 FL (ref 79–97)
MONOCYTES # BLD AUTO: 1.1 10*3/MM3 (ref 0.1–0.9)
MONOCYTES NFR BLD AUTO: 9.9 % (ref 5–12)
NEUTROPHILS # BLD AUTO: 8.39 10*3/MM3 (ref 1.7–7)
NEUTROPHILS NFR BLD AUTO: 75.8 % (ref 42.7–76)
NRBC BLD AUTO-RTO: 0 /100 WBC (ref 0–0.2)
PLATELET # BLD AUTO: 251 10*3/MM3 (ref 140–450)
PMV BLD AUTO: 9.7 FL (ref 6–12)
RBC # BLD AUTO: 4.11 10*6/MM3 (ref 4.14–5.8)
TIBC SERPL-MCNC: 265 MCG/DL (ref 249–505)
TRANSFERRIN SERPL-MCNC: 189 MG/DL (ref 200–360)
WBC NRBC COR # BLD: 11.07 10*3/MM3 (ref 3.4–10.8)

## 2019-11-18 PROCEDURE — G0463 HOSPITAL OUTPT CLINIC VISIT: HCPCS | Performed by: INTERNAL MEDICINE

## 2019-11-18 PROCEDURE — 83540 ASSAY OF IRON: CPT

## 2019-11-18 PROCEDURE — 36415 COLL VENOUS BLD VENIPUNCTURE: CPT

## 2019-11-18 PROCEDURE — 84466 ASSAY OF TRANSFERRIN: CPT

## 2019-11-18 PROCEDURE — 85025 COMPLETE CBC W/AUTO DIFF WBC: CPT

## 2019-11-18 PROCEDURE — G0463 HOSPITAL OUTPT CLINIC VISIT: HCPCS

## 2019-11-18 PROCEDURE — 99213 OFFICE O/P EST LOW 20 MIN: CPT | Performed by: INTERNAL MEDICINE

## 2019-11-18 NOTE — PROGRESS NOTES
"[facility]         Dr Jana Jones  [unfilled]  Patient Care Team:  Tej Cedillo Jr., MD as PCP - General  Tej Cedillo Jr., MD as PCP - Family Medicine  Tej Cedillo Jr., MD as PCP - Claims Attributed  Tej Cedillo Jr., MD as Referring Physician (Internal Medicine)  Rasheeda Novoa MD as Consulting Physician (Hematology and Oncology)    Chief Complaint:Overall apnea index of 18.9 events per hour  RDI 24 events per hour  Apnea index left side 16.4 events per hour  Apnea index on the right side 19.3 events per hour  Apnea index during REM sleep 13 events per hour  Recommend treatment pressures of 8 cm with heated humidity and ramp  Interval History: He is here after sleep study to discuss his compliance.  He is currently set up on CPAP 8 cm compliance 43% with average daily use of 8 hours 15 minutes.  His compliance actually is much better but we only got the last 2 weeks.  He seems to be tolerating the machine well.  He still having residual AHI of 14.6 events per hour.  Currently goes to bedtime 10 PM gets up 7:30 in the morning.  Gets about 8 hours of sleep and generally feels rested.  No tobacco abuse no alcohol abuse no caffeine abuse.  Currently has a full facemask and he still getting used to it.  He still has issues but currently wants to stay with this machine.  He feels pressure is adequate.  Supplies are adequate.    REVIEW OF SYSTEMS:   CARDIOVASCULAR: No chest pain, chest pressure or chest discomfort. No palpitations or edema.   RESPIRATORY: No shortness of breath, cough or sputum.   GASTROINTESTINAL: No anorexia, nausea, vomiting or diarrhea. No abdominal pain or blood.   HEMATOLOGIC: No bleeding or bruising.     Ventilator/Non-Invasive Ventilation Settings (From admission, onward)    None            Vital Signs  Heart Rate:  [83] 83  BP: (143)/(75) 143/75  [unfilled]  Flowsheet Rows      First Filed Value   Admission Height  172.7 cm (68\") Documented at " 11/18/2019 1133   Admission Weight  72.6 kg (160 lb) Documented at 11/18/2019 1133          Physical Exam:   General Appearance:    Alert, cooperative, in no acute distress   Lungs:     Clear to auscultation,respirations regular, even and                  unlabored  ENT Mallampati between 3 and 4 normal nasal exam  Neck midline trachea no thyromegaly    Heart:    Regular rhythm and normal rate, normal S1 and S2, no            murmur, no gallop, no rub, no click   Chest Wall:    No abnormalities observed   Abdomen:     Normal bowel sounds, no masses, no organomegaly, soft        non-tender, non-distended, no guarding, no rebound                tenderness   Extremities:   Moves all extremities well, no edema, no cyanosis, no             redness     Results Review:                                          I reviewed the patient's new clinical results.  I personally viewed and interpreted the patient's CXR        Medication Review:         No current facility-administered medications for this visit.     ASSESSMENT:   CRISTAL on CPAP  Rheumatoid arthritis  Recent drug fever  Seizure disorder  History of tobacco abuse    PLAN:  Reviewed download.  His compliance is decent since he started using the machine on 16 September.  We have limited compliance.  His AHI remains high at 14.6 events per hour.  I will switch him to auto CPAP 8 to 20 cm and go from there.  Patient is benefiting from CPAP.  He has a lot of issues and possibly some underlying dementia but I explained him and his daughter in detail how he is progressing  Discuss sleep study result in detail with the patient and daughter in detail  Sleep hygiene measures  Follow-up in 6 to 8 weeks to review his compliance download    Jana Jones MD  11/18/19  11:56 AM

## 2019-11-18 NOTE — PROGRESS NOTES
"  REASON FOR FOLLOW-UP: Iron deficiency anemia    HISTORY OF PRESENT ILLNESS:  The patient is a 83 y.o. year old male who is here for an opinion about the above issue.    Patient reports feeling \"slow\" but not fatigued.  He had some trouble with his knees and ankle due to his rheumatoid arthritis.  This problem improved with 1 mg of Prednisone.  His GI doctor told him he does not need to be scoped as of now.  He saw pulmonologist after sleep study and he is tolerating the CPAP machine well.  We will continue to monitor patient.     Patient's labs from 11/18/19 show WBC 11.07 and hemoglobin 12.8.  His iron is 31, iron saturation 12, and transferrin 189.       Past Medical History:   Diagnosis Date   • Cancer (CMS/HCC)     skin cancer   • CVD (cerebrovascular disease)     with remote infarcts per CT of head.   • H/O Iron deficiency anemia    • H/O Traumatic brain injury (CMS/HCC)    • H/O: pneumonia 04/2015   • Hyperlipidemia    • Hypertension    • Rheumatoid arthritis (CMS/HCC)    • Seizures (CMS/HCC)     Started in 1960s.        Past Surgical History:   Procedure Laterality Date   • APPENDECTOMY  1950   • CATARACT EXTRACTION     • COLONOSCOPY N/A 12/12/2017    Procedure: COLONOSCOPY TO CECUM AND TI WITH APC CAUTERY OF RIGHT COLON AVM ;  Surgeon: Lucio Stein MD;  Location: [facility] ENDOSCOPY;  Service:    • ENDOSCOPY N/A 12/12/2017    Procedure: ESOPHAGOGASTRODUODENOSCOPY with BX ;  Surgeon: Lucio Stein MD;  Location: [facility] ENDOSCOPY;  Service:         Current Outpatient Medications:   •  amLODIPine (NORVASC) 5 MG tablet, Take 5 mg by mouth Daily., Disp: , Rfl:   •  Cholecalciferol (VITAMIN D PO), Take 50,000 mg by mouth Every 14 (Fourteen) Days., Disp: , Rfl:   •  docusate sodium 100 MG capsule, Take 100 mg by mouth 2 (Two) Times a Day. (Patient taking differently: Take 100 mg by mouth Daily.), Disp: , Rfl:   •  hydroxychloroquine (PLAQUENIL) 200 MG tablet, Take 200 mg by mouth 2 (Two) Times a Day., Disp: , " Rfl:   •  levETIRAcetam (KEPPRA) 500 MG tablet, Take 500 mg by mouth 2 (Two) Times a Day., Disp: , Rfl:   •  Multiple Vitamin (MULTI VITAMIN MENS PO), Take 1 tablet by mouth Daily., Disp: , Rfl:   •  predniSONE (DELTASONE) 1 MG tablet, 1 mg Daily., Disp: , Rfl:   •  tamsulosin (FLOMAX) 0.4 MG capsule 24 hr capsule, Take 1 capsule by mouth every night., Disp: 30 capsule, Rfl: 0    ALLERGIES:  No Known Allergies     Social History     Socioeconomic History   • Marital status:      Spouse name: Abbie   • Number of children: Not on file   • Years of education: College   • Highest education level: Not on file   Occupational History     Employer: RETIRED   Tobacco Use   • Smoking status: Former Smoker     Years: 15.00     Types: Cigarettes     Last attempt to quit: 1987     Years since quittin.9   • Smokeless tobacco: Never Used   Substance and Sexual Activity   • Alcohol use: No   • Drug use: No   • Sexual activity: Defer        Family History   Problem Relation Age of Onset   • Colon cancer Other    • Hypertension Mother    • Mental illness Mother    • Hypertension Father    • Heart disease Sister    • Hypertension Sister    • Colon cancer Sister    • Skin cancer Sister    • Heart disease Brother    • Hypertension Brother    • Lung cancer Brother    • Diabetes Daughter    • Throat cancer Brother    • Cancer Brother         Bladder   • Prostate cancer Brother       I have reviewed the patient's medical history in detail and updated the computerized patient record.    Review of Systems   Constitutional: Negative for appetite change, chills, diaphoresis, fatigue, fever and unexpected weight change.   HENT: Negative for hearing loss, sore throat and trouble swallowing.    Respiratory: Negative for cough, chest tightness, shortness of breath and wheezing.    Cardiovascular: Negative for chest pain, palpitations and leg swelling.   Gastrointestinal: Negative for abdominal distention, abdominal pain,  "constipation, diarrhea, nausea and vomiting.   Genitourinary: Negative for dysuria, frequency, hematuria and urgency.   Musculoskeletal: Negative for joint swelling.        No muscle weakness.   Skin: Negative for rash and wound.   Neurological: Negative for seizures, syncope, speech difficulty, weakness, numbness and headaches.   Hematological: Negative for adenopathy. Does not bruise/bleed easily.   Psychiatric/Behavioral: Negative for behavioral problems, confusion and suicidal ideas.         Review of Systems   Constitutional: Positive for fatigue. Negative for appetite change, chills, diaphoresis, fever and unexpected weight change.   HENT: Negative for hearing loss, sore throat and trouble swallowing.    Respiratory: Positive for shortness of breath. Negative for cough, chest tightness and wheezing.    Cardiovascular: Negative for chest pain, palpitations and leg swelling.   Gastrointestinal: Negative for abdominal distention, abdominal pain, constipation, diarrhea, nausea and vomiting.   Genitourinary: Negative for dysuria, frequency, hematuria and urgency.   Musculoskeletal: Positive for arthralgias and back pain. Negative for joint swelling.        No muscle weakness.   Skin: Negative for rash and wound.   Neurological: Positive for weakness. Negative for seizures, syncope, speech difficulty, numbness and headaches.   Hematological: Negative for adenopathy. Does not bruise/bleed easily.   Psychiatric/Behavioral: Negative for behavioral problems, confusion and suicidal ideas.       Objective     Vitals:    11/18/19 1334   BP: 126/65   Pulse: 73   Resp: 16   Temp: 99 °F (37.2 °C)   TempSrc: Oral   SpO2: 97%   Weight: 72.1 kg (159 lb)   Height: 172.7 cm (67.99\")   PainSc:   3   PainLoc: Knee  Comment: Bilat knee arthritis pain     Current Status 11/18/2019   ECOG score 0     Physical Exam    GENERAL:  Well-developed, well-nourished in no acute distress.   SKIN:  Warm, dry without rashes, purpura or " petechiae.  NECK:  Supple with good range of motion; no thyromegaly or masses, no JVD.  LYMPHATICS:  No cervical, supraclavicular, axillary or inguinal adenopathy.  CHEST:  Lungs clear to auscultation. Good airflow.  CARDIAC:  Regular rate and rhythm without murmurs, rubs or gallops. Normal S1,S2.  ABDOMEN:  Soft, nontender with no hepatosplenomegaly or masses.  EXTREMITIES:  No clubbing, cyanosis or edema.  NEUROLOGICAL:  Cranial Nerves II-XII grossly intact.  No focal neurological deficits.  PSYCHIATRIC:  Normal affect and mood.      RECENT LABS:  Results from last 7 days   Lab Units 11/18/19  1311   WBC 10*3/mm3 11.07*   NEUTROS ABS 10*3/mm3 8.39*   HEMOGLOBIN g/dL 12.8*   HEMATOCRIT % 39.0   PLATELETS 10*3/mm3 251     Results from last 7 days   Lab Units 11/18/19  1311   IRON mcg/dL 31*   TIBC mcg/dL 265              Assessment/Plan   1.  Iron deficiency anemia: Patient was seen by Dr. Cedillo January 2019 he was found to be iron deficient.  He denied any active GI bleeding.  He has been started on ferrous sulfate 325 mg 1 p.o. daily but without adequate control.  B12, folate, serum protein electrophoresis and flow cytometry negative.  Ferrous sulfate increased to twice daily 4/6/2019 though this resulted in abdominal cramping and constipation.    The patient remains iron deficient today with a decreased hemoglobin at 11.7, iron saturation of 10%.  We will proceed with Injectafer x2.    · The patient has previously been seen by Dr. Rachel Stein December 2017 with EGD and colonoscopy.  EGD showed grade B esophagitis, chronic gastritis.  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  ·   Due to recurrent iron deficiency, we will ask patient to follow-up with Dr. Stein .  To evaluate for colon polyp or colon colon cancer given his age.  His sister got recently diagnosed with colon cancer at age 90 and has had surgery.    2.  History of severe rheumatoid arthritis  "for which he is on Plaquenil.  His rheumatologist wants to start methotrexate but he is very concerned about starting methotrexate.    3.  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    4.  Family history of lung cancer in a brother in his 80s and he  at 88 with lung cancer.  Second brother had throat cancer in the 70s and  at 80 with \"cancer.  Third brother had bladder cancer and fourth brother had prostate cancer he had a sister with colon cancer.    Plan  1. Gastroenterologist said patient does not need to be scoped right now.  2. Return in 6 months with NP with labs  3. Return in 1 year with Lauraalam with labs  4. Patient has been advised to eat iron-containing foods like kale and broccoli.      I, Kellen Sow, acted as scribe for Rasheeda Novoa MD  in documenting the service or procedure. To the best of my knowledge, I recorded what was dictated by MD Rasheeda Chapin MD  19    CC:  Tej Cedillo MD  "

## 2020-01-08 ENCOUNTER — TRANSCRIBE ORDERS (OUTPATIENT)
Dept: ADMINISTRATIVE | Facility: HOSPITAL | Age: 85
End: 2020-01-08

## 2020-01-08 DIAGNOSIS — R60.0 EDEMA OF BOTH LEGS: Primary | ICD-10-CM

## 2020-01-10 ENCOUNTER — HOSPITAL ENCOUNTER (OUTPATIENT)
Dept: CARDIOLOGY | Facility: HOSPITAL | Age: 85
Discharge: HOME | End: 2020-01-10
Admitting: INTERNAL MEDICINE

## 2020-01-10 DIAGNOSIS — R60.0 EDEMA OF BOTH LEGS: ICD-10-CM

## 2020-01-10 LAB

## 2020-01-10 PROCEDURE — 93970 EXTREMITY STUDY: CPT

## 2020-01-13 ENCOUNTER — OFFICE VISIT (OUTPATIENT)
Dept: SLEEP MEDICINE | Facility: HOSPITAL | Age: 85
End: 2020-01-13

## 2020-01-13 VITALS
BODY MASS INDEX: 24.1 KG/M2 | HEIGHT: 68 IN | DIASTOLIC BLOOD PRESSURE: 73 MMHG | HEART RATE: 66 BPM | WEIGHT: 159 LBS | SYSTOLIC BLOOD PRESSURE: 148 MMHG | OXYGEN SATURATION: 97 %

## 2020-01-13 DIAGNOSIS — G47.33 OSA ON CPAP: Primary | ICD-10-CM

## 2020-01-13 DIAGNOSIS — Z99.89 OSA ON CPAP: Primary | ICD-10-CM

## 2020-01-13 PROCEDURE — G0463 HOSPITAL OUTPT CLINIC VISIT: HCPCS

## 2020-01-13 NOTE — PROGRESS NOTES
"[facility] PULMONARY CARE         Dr Jana Jones  [unfilled]  Patient Care Team:  Tej Cedillo Jr., MD as PCP - General  Tej Cedillo Jr., MD as PCP - Family Medicine  Tej Cedillo Jr., MD as Referring Physician (Internal Medicine)  Rasheeda Novoa MD as Consulting Physician (Hematology and Oncology)    Chief Complaint:Overall apnea index of 18.9 events per hour  RDI 24 events per hour  Apnea index left side 16.4 events per hour  Apnea index on the right side 19.3 events per hour  Apnea index during REM sleep 13 events per hour  Recommend treatment pressures of 8 cm with heated humidity and ramp    Interval History   Patient here for compliance visit.  As you recall last visit we adjusted her auto CPAP to 8 to 20 cm.  His compliance today is 98% average daily use 8 hours 21 minutes.  AHI 1.6 events per hour leak is acceptable.  Patient benefiting from CPAP.  He is much improved with auto CPAP.  Goes to bed 1030 gets up 630 gets about 8 hours of sleep and feels rested.  No tobacco no alcohol no caffeine abuse.  Currently has a nasal pillow which fits well.  No issues with mask or machine.      REVIEW OF SYSTEMS:   CARDIOVASCULAR: No chest pain, chest pressure or chest discomfort. No palpitations or edema.   RESPIRATORY: No shortness of breath, cough or sputum.   GASTROINTESTINAL: No anorexia, nausea, vomiting or diarrhea. No abdominal pain or blood.   HEMATOLOGIC: No bleeding or bruising.  Positive for cough ear pain postnasal drainage  [place] 2 out of 24 within normal limits    Ventilator/Non-Invasive Ventilation Settings (From admission, onward)    None            Vital Signs  Heart Rate:  [66] 66  BP: (148)/(73) 148/73  [unfilled]  Flowsheet Rows      First Filed Value   Admission Height  172.7 cm (68\") Documented at 01/13/2020 1130   Admission Weight  72.1 kg (159 lb) Documented at 01/13/2020 1130          Physical Exam:   General Appearance:    Alert, cooperative, in no acute distress "   Lungs:     Clear to auscultation,respirations regular, even and                  unlabored  ENT Mallampati between 3 and 4's positive nasal congestion    Heart:    Regular rhythm and normal rate, normal S1 and S2, no            murmur, no gallop, no rub, no click   Chest Wall:    No abnormalities observed   Abdomen:     Normal bowel sounds, no masses, no organomegaly, soft        non-tender, non-distended, no guarding, no rebound                tenderness   Extremities:   Moves all extremities well, no edema, no cyanosis, no             redness     Results Review:                                          I reviewed the patient's new clinical results.  I personally viewed and interpreted the patient's CXR        Medication Review:         No current facility-administered medications for this visit.     ASSESSMENT:   CRISTAL on CPAP  Rheumatoid arthritis  Recent drug fever  Seizure disorder  History of tobacco abuse    PLAN:  Patient here for compliance visit.  As you recall we adjusted his pressure last visit to auto CPAP 8 to 20 cm.  Compliance AHI and leak are now excellent with residual AHI of only 1.6 events per hour  Continue auto CPAP 8 to 20 cm  Sleep hygiene measures  Weight loss encouraged  Supplies be renewed for 1 year  Use over-the-counter nasal steroids for congestion  Patient already quit smoking  Follow-up in 1 year    Jana Jones MD  01/13/20  12:02 PM

## 2020-05-04 ENCOUNTER — LAB (OUTPATIENT)
Dept: LAB | Facility: HOSPITAL | Age: 85
End: 2020-05-04

## 2020-05-04 ENCOUNTER — OFFICE VISIT (OUTPATIENT)
Dept: ONCOLOGY | Facility: CLINIC | Age: 85
End: 2020-05-04

## 2020-05-04 VITALS
RESPIRATION RATE: 16 BRPM | HEART RATE: 74 BPM | TEMPERATURE: 97.7 F | HEIGHT: 68 IN | BODY MASS INDEX: 24.31 KG/M2 | DIASTOLIC BLOOD PRESSURE: 70 MMHG | WEIGHT: 160.4 LBS | OXYGEN SATURATION: 96 % | SYSTOLIC BLOOD PRESSURE: 144 MMHG

## 2020-05-04 DIAGNOSIS — D50.9 IRON DEFICIENCY ANEMIA, UNSPECIFIED IRON DEFICIENCY ANEMIA TYPE: Primary | ICD-10-CM

## 2020-05-04 DIAGNOSIS — D50.9 IRON DEFICIENCY ANEMIA, UNSPECIFIED IRON DEFICIENCY ANEMIA TYPE: ICD-10-CM

## 2020-05-04 LAB
ALBUMIN SERPL-MCNC: 3.7 G/DL (ref 3.5–5.2)
ALBUMIN/GLOB SERPL: 1.3 G/DL (ref 1.1–2.4)
ALP SERPL-CCNC: 65 U/L (ref 38–116)
ALT SERPL W P-5'-P-CCNC: 15 U/L (ref 0–41)
ANION GAP SERPL CALCULATED.3IONS-SCNC: 12.3 MMOL/L (ref 5–15)
AST SERPL-CCNC: 17 U/L (ref 0–40)
BASOPHILS # BLD AUTO: 0.03 10*3/MM3 (ref 0–0.2)
BASOPHILS NFR BLD AUTO: 0.3 % (ref 0–1.5)
BILIRUB SERPL-MCNC: 0.3 MG/DL (ref 0.2–1.2)
BUN BLD-MCNC: 13 MG/DL (ref 6–20)
BUN/CREAT SERPL: 14.6 (ref 7.3–30)
CALCIUM SPEC-SCNC: 9.1 MG/DL (ref 8.5–10.2)
CHLORIDE SERPL-SCNC: 103 MMOL/L (ref 98–107)
CO2 SERPL-SCNC: 22.7 MMOL/L (ref 22–29)
CREAT BLD-MCNC: 0.89 MG/DL (ref 0.7–1.3)
DEPRECATED RDW RBC AUTO: 45.2 FL (ref 37–54)
EOSINOPHIL # BLD AUTO: 0.08 10*3/MM3 (ref 0–0.4)
EOSINOPHIL NFR BLD AUTO: 0.8 % (ref 0.3–6.2)
ERYTHROCYTE [DISTWIDTH] IN BLOOD BY AUTOMATED COUNT: 12.9 % (ref 12.3–15.4)
FERRITIN SERPL-MCNC: 546.8 NG/ML (ref 30–400)
GFR SERPL CREATININE-BSD FRML MDRD: 82 ML/MIN/1.73
GLOBULIN UR ELPH-MCNC: 2.9 GM/DL (ref 1.8–3.5)
GLUCOSE BLD-MCNC: 118 MG/DL (ref 74–124)
HCT VFR BLD AUTO: 40.5 % (ref 37.5–51)
HGB BLD-MCNC: 13.1 G/DL (ref 13–17.7)
IMM GRANULOCYTES # BLD AUTO: 0.04 10*3/MM3 (ref 0–0.05)
IMM GRANULOCYTES NFR BLD AUTO: 0.4 % (ref 0–0.5)
IRON 24H UR-MRATE: 40 MCG/DL (ref 59–158)
IRON SATN MFR SERPL: 15 % (ref 14–48)
LYMPHOCYTES # BLD AUTO: 1.08 10*3/MM3 (ref 0.7–3.1)
LYMPHOCYTES NFR BLD AUTO: 11.1 % (ref 19.6–45.3)
MCH RBC QN AUTO: 31 PG (ref 26.6–33)
MCHC RBC AUTO-ENTMCNC: 32.3 G/DL (ref 31.5–35.7)
MCV RBC AUTO: 95.7 FL (ref 79–97)
MONOCYTES # BLD AUTO: 0.84 10*3/MM3 (ref 0.1–0.9)
MONOCYTES NFR BLD AUTO: 8.6 % (ref 5–12)
NEUTROPHILS # BLD AUTO: 7.65 10*3/MM3 (ref 1.7–7)
NEUTROPHILS NFR BLD AUTO: 78.8 % (ref 42.7–76)
NRBC BLD AUTO-RTO: 0 /100 WBC (ref 0–0.2)
PLATELET # BLD AUTO: 222 10*3/MM3 (ref 140–450)
PMV BLD AUTO: 9.8 FL (ref 6–12)
POTASSIUM BLD-SCNC: 4.3 MMOL/L (ref 3.5–4.7)
PROT SERPL-MCNC: 6.6 G/DL (ref 6.3–8)
RBC # BLD AUTO: 4.23 10*6/MM3 (ref 4.14–5.8)
SODIUM BLD-SCNC: 138 MMOL/L (ref 134–145)
TIBC SERPL-MCNC: 262 MCG/DL (ref 249–505)
TRANSFERRIN SERPL-MCNC: 187 MG/DL (ref 200–360)
WBC NRBC COR # BLD: 9.72 10*3/MM3 (ref 3.4–10.8)

## 2020-05-04 PROCEDURE — 36415 COLL VENOUS BLD VENIPUNCTURE: CPT

## 2020-05-04 PROCEDURE — 80053 COMPREHEN METABOLIC PANEL: CPT

## 2020-05-04 PROCEDURE — 84466 ASSAY OF TRANSFERRIN: CPT

## 2020-05-04 PROCEDURE — 99213 OFFICE O/P EST LOW 20 MIN: CPT | Performed by: NURSE PRACTITIONER

## 2020-05-04 PROCEDURE — 83540 ASSAY OF IRON: CPT

## 2020-05-04 PROCEDURE — 82728 ASSAY OF FERRITIN: CPT

## 2020-05-04 PROCEDURE — 85025 COMPLETE CBC W/AUTO DIFF WBC: CPT

## 2020-05-04 NOTE — PROGRESS NOTES
REASON FOR FOLLOW-UP: Iron deficiency anemia    HISTORY OF PRESENT ILLNESS:  The patient is a 84 y.o. year old male who is here for an opinion about the above issue.    Patient returns today overall feeling well.  He has improved in his energy since using his CPAP machine and having some minor adjustments last fall to this.  His daughter accompanies him today.  He denies any recent blood in the stool, abdominal pain, nausea, or any other bleeding.  He denies any increase shortness of breath.  He is trying to stay at home for the most part though occasionally does go to the store but wears his mask per his daughter's report.      Past Medical History:   Diagnosis Date   • Cancer (CMS/HCC)     skin cancer   • CVD (cerebrovascular disease)     with remote infarcts per CT of head.   • H/O Iron deficiency anemia    • H/O Traumatic brain injury (CMS/HCC)    • H/O: pneumonia 04/2015   • Hyperlipidemia    • Hypertension    • Rheumatoid arthritis (CMS/HCC)    • Seizures (CMS/HCC)     Started in 1960s.        Past Surgical History:   Procedure Laterality Date   • APPENDECTOMY  1950   • CATARACT EXTRACTION     • COLONOSCOPY N/A 12/12/2017    Procedure: COLONOSCOPY TO CECUM AND TI WITH APC CAUTERY OF RIGHT COLON AVM ;  Surgeon: Lucio Stein MD;  Location: [facility] ENDOSCOPY;  Service:    • ENDOSCOPY N/A 12/12/2017    Procedure: ESOPHAGOGASTRODUODENOSCOPY with BX ;  Surgeon: Lucio Stein MD;  Location: [facility] ENDOSCOPY;  Service:         Current Outpatient Medications:   •  Cholecalciferol (VITAMIN D PO), Take 50,000 mg by mouth Every 14 (Fourteen) Days., Disp: , Rfl:   •  docusate sodium 100 MG capsule, Take 100 mg by mouth 2 (Two) Times a Day., Disp: , Rfl:   •  hydroxychloroquine (PLAQUENIL) 200 MG tablet, Take 200 mg by mouth 2 (Two) Times a Day., Disp: , Rfl:   •  levETIRAcetam (KEPPRA) 500 MG tablet, Take 500 mg by mouth 2 (Two) Times a Day., Disp: , Rfl:   •  Multiple Vitamin (MULTI VITAMIN MENS PO), Take 1 tablet  by mouth Daily., Disp: , Rfl:   •  predniSONE (DELTASONE) 1 MG tablet, 1 mg Daily., Disp: , Rfl:   •  tamsulosin (FLOMAX) 0.4 MG capsule 24 hr capsule, Take 1 capsule by mouth every night., Disp: 30 capsule, Rfl: 0    ALLERGIES:    Allergies   Allergen Reactions   • Sulfadiazine Unknown - High Severity     fever        Social History     Socioeconomic History   • Marital status:      Spouse name: Abbie   • Number of children: Not on file   • Years of education: College   • Highest education level: Not on file   Occupational History     Employer: RETIRED   Tobacco Use   • Smoking status: Former Smoker     Years: 15.00     Types: Cigarettes     Last attempt to quit: 1987     Years since quittin.4   • Smokeless tobacco: Never Used   Substance and Sexual Activity   • Alcohol use: No   • Drug use: No   • Sexual activity: Defer        Family History   Problem Relation Age of Onset   • Colon cancer Other    • Hypertension Mother    • Mental illness Mother    • Hypertension Father    • Heart disease Sister    • Hypertension Sister    • Colon cancer Sister    • Skin cancer Sister    • Heart disease Brother    • Hypertension Brother    • Lung cancer Brother    • Diabetes Daughter    • Throat cancer Brother    • Cancer Brother         Bladder   • Prostate cancer Brother       I have reviewed the patient's medical history in detail and updated the computerized patient record.    Review of Systems   Constitutional: Negative for appetite change, chills, diaphoresis, fatigue, fever and unexpected weight change.   HENT: Negative for hearing loss, sore throat and trouble swallowing.    Respiratory: Negative for cough, chest tightness, shortness of breath and wheezing.    Cardiovascular: Negative for chest pain, palpitations and leg swelling.   Gastrointestinal: Negative for abdominal distention, abdominal pain, constipation, diarrhea, nausea and vomiting.   Genitourinary: Negative for dysuria, frequency, hematuria  "and urgency.   Musculoskeletal: Negative for joint swelling.        No muscle weakness.   Skin: Negative for rash and wound.   Neurological: Negative for seizures, syncope, speech difficulty, weakness, numbness and headaches.   Hematological: Negative for adenopathy. Does not bruise/bleed easily.   Psychiatric/Behavioral: Negative for behavioral problems, confusion and suicidal ideas.         Objective     Vitals:    05/04/20 1017   BP: 144/70   Pulse: 74   Resp: 16   Temp: 97.7 °F (36.5 °C)   TempSrc: Oral   SpO2: 96%   Weight: 72.8 kg (160 lb 6.4 oz)   Height: 172.7 cm (67.99\")   PainSc: 0-No pain     Current Status 5/4/2020   ECOG score 0     Physical Exam    GENERAL:  Well-developed, well-nourished in no acute distress.   SKIN:  Dry without rashes, purpura or petechiae.  NECK:  Supple with good range of motion; no JVD.  CHEST: No respiratory distress.  CARDIAC:  Regular rate and rhythm without murmurs, rubs or gallops. Normal S1,S2.  EXTREMITIES:  No edema.  NEUROLOGICAL: No focal neurological deficits.  PSYCHIATRIC:  Normal affect and mood.      RECENT LABS:  Results from last 7 days   Lab Units 05/04/20  1003   WBC 10*3/mm3 9.72   NEUTROS ABS 10*3/mm3 7.65*   HEMOGLOBIN g/dL 13.1   HEMATOCRIT % 40.5   PLATELETS 10*3/mm3 222                  Assessment/Plan   1.  Iron deficiency anemia: Patient was seen by Dr. Cedillo January 2019 he was found to be iron deficient.  He denied any active GI bleeding.  He has been started on ferrous sulfate 325 mg 1 p.o. daily but without adequate control.  B12, folate, serum protein electrophoresis and flow cytometry negative.  Ferrous sulfate increased to twice daily 4/6/2019 though this resulted in abdominal cramping and constipation.    The patient remains iron deficient today with a decreased hemoglobin at 11.7, iron saturation of 10%.  We will proceed with Injectafer x2.    · The patient has previously been seen by Dr. Rachel Stein December 2017 with EGD and colonoscopy.  " "EGD showed grade B esophagitis, chronic gastritis.  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  ·   Due to recurrent iron deficiency, we will ask patient to follow-up with Dr. Stein .  To evaluate for colon polyp or colon colon cancer given his age.  His sister got recently diagnosed with colon cancer at age 90 and has had surgery.  Dr. Stein did not feel like the patient needed to be scoped.  · Patient returns today, 2020 overall feeling well.  His hemoglobin is good at 13.1.  Patient's iron studies resulted after he left the office today though shows stable ferritin.    2.  History of severe rheumatoid arthritis for which he is on Plaquenil.  Rheumatology did place him on prednisone that has been helpful.    3.  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    4.  Family history of lung cancer in a brother in his 80s and he  at 88 with lung cancer.  Second brother had throat cancer in the 70s and  at 80 with \"cancer.  Third brother had bladder cancer and fourth brother had prostate cancer he had a sister with colon cancer.    Plan  1. Patient to return to the office in 6 months for follow-up Dr. Novoa and repeat iron studies.  2. Patient encouraged to call the office for any progressive fatigue at which time we could bring her back sooner to recheck labs.      SHARLENE Diaz      CC:  Tej Cedillo MD  "

## 2020-06-09 ENCOUNTER — TRANSCRIBE ORDERS (OUTPATIENT)
Dept: ADMINISTRATIVE | Facility: HOSPITAL | Age: 85
End: 2020-06-09

## 2020-06-09 DIAGNOSIS — D32.9 BENIGN NEOPLASM OF MENINGES (HCC): Primary | ICD-10-CM

## 2020-06-23 ENCOUNTER — HOSPITAL ENCOUNTER (OUTPATIENT)
Dept: MRI IMAGING | Facility: HOSPITAL | Age: 85
Discharge: HOME | End: 2020-06-23
Admitting: PSYCHIATRY & NEUROLOGY

## 2020-06-23 DIAGNOSIS — D32.9 BENIGN NEOPLASM OF MENINGES (HCC): ICD-10-CM

## 2020-06-23 PROCEDURE — A9577 INJ MULTIHANCE: HCPCS | Performed by: PSYCHIATRY & NEUROLOGY

## 2020-06-23 PROCEDURE — 0 GADOBENATE DIMEGLUMINE 529 MG/ML SOLUTION: Performed by: PSYCHIATRY & NEUROLOGY

## 2020-06-23 PROCEDURE — 70553 MRI BRAIN STEM W/O & W/DYE: CPT

## 2020-06-23 RX ADMIN — GADOBENATE DIMEGLUMINE 15 ML: 529 INJECTION, SOLUTION INTRAVENOUS at 12:27

## 2020-11-20 ENCOUNTER — TELEPHONE (OUTPATIENT)
Dept: ONCOLOGY | Facility: CLINIC | Age: 85
End: 2020-11-20

## 2020-11-20 NOTE — TELEPHONE ENCOUNTER
Spoke with pt's daughter, Hannah, she states her father has some hearing and memory issues and would like for her sister to be present at the visit with Dr. Novoa. I informed Hannah that I will check with Dr. Novoa and then let her know. Spoke with Aileen GARZON, she is going to ask Dr. Novoa and call the family with the answer.     Dr. RIVAS approved visitor to come with pt to visit on 11/23

## 2020-11-20 NOTE — TELEPHONE ENCOUNTER
Juan C's daughter Hannah is requesting to go to his appts with him. She says he's had a brain injury, has hearing and memory issues.    Phone# [phone]

## 2020-11-23 ENCOUNTER — OFFICE VISIT (OUTPATIENT)
Dept: ONCOLOGY | Facility: CLINIC | Age: 85
End: 2020-11-23

## 2020-11-23 ENCOUNTER — LAB (OUTPATIENT)
Dept: LAB | Facility: HOSPITAL | Age: 85
End: 2020-11-23

## 2020-11-23 VITALS
OXYGEN SATURATION: 96 % | TEMPERATURE: 98.1 F | HEART RATE: 71 BPM | DIASTOLIC BLOOD PRESSURE: 77 MMHG | SYSTOLIC BLOOD PRESSURE: 167 MMHG | BODY MASS INDEX: 23.39 KG/M2 | WEIGHT: 154.3 LBS | HEIGHT: 68 IN | RESPIRATION RATE: 18 BRPM

## 2020-11-23 DIAGNOSIS — D50.9 IRON DEFICIENCY ANEMIA, UNSPECIFIED IRON DEFICIENCY ANEMIA TYPE: Primary | ICD-10-CM

## 2020-11-23 LAB
ALBUMIN SERPL-MCNC: 3.8 G/DL (ref 3.5–5.2)
ALBUMIN/GLOB SERPL: 1.6 G/DL (ref 1.1–2.4)
ALP SERPL-CCNC: 74 U/L (ref 38–116)
ALT SERPL W P-5'-P-CCNC: 18 U/L (ref 0–41)
ANION GAP SERPL CALCULATED.3IONS-SCNC: 10.5 MMOL/L (ref 5–15)
AST SERPL-CCNC: 24 U/L (ref 0–40)
BASOPHILS # BLD AUTO: 0.04 10*3/MM3 (ref 0–0.2)
BASOPHILS NFR BLD AUTO: 0.6 % (ref 0–1.5)
BILIRUB SERPL-MCNC: 0.4 MG/DL (ref 0.2–1.2)
BUN SERPL-MCNC: 15 MG/DL (ref 6–20)
BUN/CREAT SERPL: 17.4 (ref 7.3–30)
CALCIUM SPEC-SCNC: 9.2 MG/DL (ref 8.5–10.2)
CHLORIDE SERPL-SCNC: 104 MMOL/L (ref 98–107)
CO2 SERPL-SCNC: 25.5 MMOL/L (ref 22–29)
CREAT SERPL-MCNC: 0.86 MG/DL (ref 0.7–1.3)
DEPRECATED RDW RBC AUTO: 47 FL (ref 37–54)
EOSINOPHIL # BLD AUTO: 0.07 10*3/MM3 (ref 0–0.4)
EOSINOPHIL NFR BLD AUTO: 1 % (ref 0.3–6.2)
ERYTHROCYTE [DISTWIDTH] IN BLOOD BY AUTOMATED COUNT: 13.2 % (ref 12.3–15.4)
FERRITIN SERPL-MCNC: 659.6 NG/ML (ref 30–400)
GFR SERPL CREATININE-BSD FRML MDRD: 85 ML/MIN/1.73
GLOBULIN UR ELPH-MCNC: 2.4 GM/DL (ref 1.8–3.5)
GLUCOSE SERPL-MCNC: 105 MG/DL (ref 74–124)
HCT VFR BLD AUTO: 39.5 % (ref 37.5–51)
HGB BLD-MCNC: 12.9 G/DL (ref 13–17.7)
IMM GRANULOCYTES # BLD AUTO: 0.02 10*3/MM3 (ref 0–0.05)
IMM GRANULOCYTES NFR BLD AUTO: 0.3 % (ref 0–0.5)
IRON 24H UR-MRATE: 68 MCG/DL (ref 59–158)
IRON SATN MFR SERPL: 24 % (ref 14–48)
LYMPHOCYTES # BLD AUTO: 0.91 10*3/MM3 (ref 0.7–3.1)
LYMPHOCYTES NFR BLD AUTO: 13.6 % (ref 19.6–45.3)
MCH RBC QN AUTO: 31.5 PG (ref 26.6–33)
MCHC RBC AUTO-ENTMCNC: 32.7 G/DL (ref 31.5–35.7)
MCV RBC AUTO: 96.3 FL (ref 79–97)
MONOCYTES # BLD AUTO: 0.8 10*3/MM3 (ref 0.1–0.9)
MONOCYTES NFR BLD AUTO: 12 % (ref 5–12)
NEUTROPHILS NFR BLD AUTO: 4.85 10*3/MM3 (ref 1.7–7)
NEUTROPHILS NFR BLD AUTO: 72.5 % (ref 42.7–76)
NRBC BLD AUTO-RTO: 0 /100 WBC (ref 0–0.2)
PLATELET # BLD AUTO: 174 10*3/MM3 (ref 140–450)
PMV BLD AUTO: 9.9 FL (ref 6–12)
POTASSIUM SERPL-SCNC: 4.4 MMOL/L (ref 3.5–4.7)
PROT SERPL-MCNC: 6.2 G/DL (ref 6.3–8)
RBC # BLD AUTO: 4.1 10*6/MM3 (ref 4.14–5.8)
SODIUM SERPL-SCNC: 140 MMOL/L (ref 134–145)
TIBC SERPL-MCNC: 280 MCG/DL (ref 249–505)
TRANSFERRIN SERPL-MCNC: 200 MG/DL (ref 200–360)
WBC # BLD AUTO: 6.69 10*3/MM3 (ref 3.4–10.8)

## 2020-11-23 PROCEDURE — 99213 OFFICE O/P EST LOW 20 MIN: CPT | Performed by: INTERNAL MEDICINE

## 2020-11-23 PROCEDURE — 80053 COMPREHEN METABOLIC PANEL: CPT

## 2020-11-23 PROCEDURE — 83540 ASSAY OF IRON: CPT | Performed by: INTERNAL MEDICINE

## 2020-11-23 PROCEDURE — 36415 COLL VENOUS BLD VENIPUNCTURE: CPT

## 2020-11-23 PROCEDURE — 84466 ASSAY OF TRANSFERRIN: CPT | Performed by: INTERNAL MEDICINE

## 2020-11-23 PROCEDURE — 82728 ASSAY OF FERRITIN: CPT | Performed by: INTERNAL MEDICINE

## 2020-11-23 PROCEDURE — 85025 COMPLETE CBC W/AUTO DIFF WBC: CPT

## 2020-11-23 RX ORDER — VITC/E/ZINC/COPPER/LUTEIN/ZEAX 250 MG-200
TABLET,CHEWABLE ORAL
Status: ON HOLD | COMMUNITY
End: 2021-01-02 | Stop reason: SDUPTHER

## 2020-11-23 RX ORDER — LEFLUNOMIDE 20 MG/1
TABLET ORAL DAILY
COMMUNITY
Start: 2020-09-16 | End: 2021-01-06 | Stop reason: HOSPADM

## 2020-11-23 NOTE — PROGRESS NOTES
REASON FOR FOLLOW-UP: Iron deficiency anemia    HISTORY OF PRESENT ILLNESS:  The patient is a 84 y.o. year old male who is here for an opinion about the above issue.    Patient returns today overall feeling well.  He has improved in his energy since using his CPAP machine and having some minor adjustments last fall to this.  His daughter accompanies him today.  He denies any recent blood in the stool, abdominal pain, nausea, or any other bleeding.  He denies any increase shortness of breath.  He is trying to stay at home for the most part though occasionally does go to the store but wears his mask per his daughter's report.    Interval history:    Patient denies any complaints.  No active bleeding.  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  This EGD showed gastritis.  Currently without complaint      Past Medical History:   Diagnosis Date   • Cancer (CMS/HCC)     skin cancer   • CVD (cerebrovascular disease)     with remote infarcts per CT of head.   • H/O Iron deficiency anemia    • H/O Traumatic brain injury (CMS/HCC)    • H/O: pneumonia 04/2015   • Hyperlipidemia    • Hypertension    • Rheumatoid arthritis (CMS/HCC)    • Seizures (CMS/HCC)     Started in 1960s.        Past Surgical History:   Procedure Laterality Date   • APPENDECTOMY  1950   • CATARACT EXTRACTION     • COLONOSCOPY N/A 12/12/2017    Procedure: COLONOSCOPY TO CECUM AND TI WITH APC CAUTERY OF RIGHT COLON AVM ;  Surgeon: Lucio Stein MD;  Location: [facility] ENDOSCOPY;  Service:    • ENDOSCOPY N/A 12/12/2017    Procedure: ESOPHAGOGASTRODUODENOSCOPY with BX ;  Surgeon: Lucio Stein MD;  Location: [facility] ENDOSCOPY;  Service:         Current Outpatient Medications:   •  Cholecalciferol (VITAMIN D PO), Take 50,000 mg by mouth Every 14 (Fourteen) Days., Disp: , Rfl:   •  docusate sodium 100 MG capsule, Take 100 mg by mouth 2 (Two) Times a Day., Disp: , Rfl:   •  hydroxychloroquine  (PLAQUENIL) 200 MG tablet, Take 200 mg by mouth 2 (Two) Times a Day., Disp: , Rfl:   •  leflunomide (ARAVA) 20 MG tablet, Daily., Disp: , Rfl:   •  levETIRAcetam (KEPPRA) 500 MG tablet, Take 500 mg by mouth 2 (Two) Times a Day., Disp: , Rfl:   •  Multiple Vitamin (MULTI VITAMIN MENS PO), Take 1 tablet by mouth Daily., Disp: , Rfl:   •  Multiple Vitamins-Minerals (Systane ICaps AREDS2) capsule, Take  by mouth., Disp: , Rfl:   •  mupirocin (BACTROBAN) 2 % ointment, , Disp: , Rfl:   •  tamsulosin (FLOMAX) 0.4 MG capsule 24 hr capsule, Take 1 capsule by mouth every night., Disp: 30 capsule, Rfl: 0    ALLERGIES:    Allergies   Allergen Reactions   • Sulfadiazine Unknown - High Severity     fever        Social History     Socioeconomic History   • Marital status:      Spouse name: Abbie   • Number of children: Not on file   • Years of education: College   • Highest education level: Not on file   Occupational History     Employer: RETIRED   Tobacco Use   • Smoking status: Former Smoker     Years: 15.00     Types: Cigarettes     Quit date: 1987     Years since quittin.9   • Smokeless tobacco: Never Used   Substance and Sexual Activity   • Alcohol use: No   • Drug use: No   • Sexual activity: Defer        Family History   Problem Relation Age of Onset   • Colon cancer Other    • Hypertension Mother    • Mental illness Mother    • Hypertension Father    • Heart disease Sister    • Hypertension Sister    • Colon cancer Sister    • Skin cancer Sister    • Heart disease Brother    • Hypertension Brother    • Lung cancer Brother    • Diabetes Daughter    • Throat cancer Brother    • Cancer Brother         Bladder   • Prostate cancer Brother       I have reviewed the patient's medical history in detail and updated the computerized patient record.    Review of Systems   Constitutional: Negative for appetite change, chills, diaphoresis, fatigue, fever and unexpected weight change.   HENT: Negative for hearing loss,  "sore throat and trouble swallowing.    Respiratory: Negative for cough, chest tightness, shortness of breath and wheezing.    Cardiovascular: Negative for chest pain, palpitations and leg swelling.   Gastrointestinal: Negative for abdominal distention, abdominal pain, constipation, diarrhea, nausea and vomiting.   Genitourinary: Negative for dysuria, frequency, hematuria and urgency.   Musculoskeletal: Negative for joint swelling.        No muscle weakness.   Skin: Negative for rash and wound.   Neurological: Negative for seizures, syncope, speech difficulty, weakness, numbness and headaches.   Hematological: Negative for adenopathy. Does not bruise/bleed easily.   Psychiatric/Behavioral: Negative for behavioral problems, confusion and suicidal ideas.   All other systems reviewed and are negative.  I have reviewed and confirmed the accuracy of the ROS as documented by the MA/LPN/RN Rasheeda Novoa MD          Objective     Vitals:    11/23/20 1125   BP: 167/77   Pulse: 71   Resp: 18   Temp: 98.1 °F (36.7 °C)   TempSrc: Skin   SpO2: 96%   Weight: 70 kg (154 lb 4.8 oz)   Height: 172.7 cm (67.99\")   PainSc: 0-No pain     Current Status 11/23/2020   ECOG score 0     Physical Exam    GENERAL:  Well-developed, well-nourished in no acute distress.   SKIN:  Dry without rashes, purpura or petechiae.  NECK:  Supple with good range of motion; no JVD.  CHEST: No respiratory distress.  CARDIAC:  Regular rate and rhythm without murmurs, rubs or gallops. Normal S1,S2.  EXTREMITIES:  No edema.  NEUROLOGICAL: No focal neurological deficits.  PSYCHIATRIC:  Normal affect and mood.    I have reexamined the patient and the results are consistent with the previously documented exam. Rasheeda Novoa MD     RECENT LABS:  Results from last 7 days   Lab Units 11/23/20  1111   WBC 10*3/mm3 6.69   NEUTROS ABS 10*3/mm3 4.85   HEMOGLOBIN g/dL 12.9*   HEMATOCRIT % 39.5   PLATELETS 10*3/mm3 174     Results from last 7 days   Lab Units " 11/23/20  1111   SODIUM mmol/L 140   POTASSIUM mmol/L 4.4   CHLORIDE mmol/L 104   CO2 mmol/L 25.5   BUN mg/dL 15   CREATININE mg/dL 0.86   CALCIUM mg/dL 9.2   ALBUMIN g/dL 3.80   BILIRUBIN mg/dL 0.4   ALK PHOS U/L 74   ALT (SGPT) U/L 18   AST (SGOT) U/L 24   GLUCOSE mg/dL 105   FERRITIN ng/mL 659.60*   IRON mcg/dL 68   TIBC mcg/dL 280            MRI BRAIN WITH AND WITHOUT CONTRAST 06/23/20    IMPRESSION:  1. Multifocal areas of encephalomalacia involving the left cerebral  hemisphere and a small remote infarct involving the left cerebellar  hemisphere posteriorly. There is no evidence of acute infarction or  evidence of infarction which is new versus 02/26/2019.  2. There is a small meningioma involving the falx anteriorly and  superiorly measuring 8 x 7 x 9 mm in size with components both to the  right and left of the falx. This is new versus the MRI examination of  02/25/2004 but unchanged as compared to 02/26/2019.    Duplex Venous Lower 01/10/20    Study Impression •     Right Common Femoral:  No deep vein thrombosis noted.   •     Right Saphenofemoral Junction:  No superficial thrombophlebitis noted.   •     Right Proximal Femoral: No deep vein thrombosis noted.   •     Right Mid Femoral: No deep vein thrombosis noted.   •     Right Distal Femoral: No deep vein thrombosis noted.   •     Right Popliteal: No deep vein thrombosis noted.   •     Right Posterior Tibial: No deep vein thrombosis noted.   •     Right Peroneal: No deep vein thrombosis noted.   •     Right Gastrocnemius Soleal: No deep vein thrombosis noted.   •     Right Greater Saphenous Above Knee: No superficial thrombophlebitis noted.   •     Right Greater Saphenous Below Knee: No superficial thrombophlebitis noted.   •     Left Common Femoral: No deep vein thrombosis noted.   •     Left Saphenofemoral Junction: No superficial thrombophlebitis noted.   •     Left Proximal Femoral: No deep vein thrombosis noted.   •     Left Mid Femoral: No deep vein  thrombosis noted.   •     Left Distal Femoral: No deep vein thrombosis noted.   •     Left Popliteal: No deep vein thrombosis noted.   •     Left Posterior Tibial: No deep vein thrombosis noted.    •     Left Peroneal: No deep vein thrombosis noted.   •     Left Gastrocnemius Soleal: No deep vein thrombosis noted.    •     Left Greater Saphenous Above Knee: No superficial thrombophlebitis noted.   •     Left Greater Saphenous Below Knee: No superficial thrombophlebitis noted.         Assessment/Plan   1.  Iron deficiency anemia: Patient was seen by Dr. Cedillo January 2019 he was found to be iron deficient.  He denied any active GI bleeding.  He has been started on ferrous sulfate 325 mg 1 p.o. daily but without adequate control.  B12, folate, serum protein electrophoresis and flow cytometry negative.  Ferrous sulfate increased to twice daily 4/6/2019 though this resulted in abdominal cramping and constipation.    The patient remains iron deficient today with a decreased hemoglobin at 11.7, iron saturation of 10%.  We will proceed with Injectafer x2.    · The patient has previously been seen by Dr. Rachel Stein December 2017 with EGD and colonoscopy.  EGD showed grade B esophagitis, chronic gastritis.  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  ·   Due to recurrent iron deficiency, we will ask patient to follow-up with Dr. Stein .  To evaluate for colon polyp or colon colon cancer given his age.  His sister got recently diagnosed with colon cancer at age 90 and has had surgery.  Dr. Stein did not feel like the patient needed to be scoped.  · Patient returns today, 5/4/2020 overall feeling well.  His hemoglobin is good at 13.1.  Patient's iron studies resulted after he left the office today though shows stable ferritin.  · Currently stable hemoglobin.  Iron studies normal    2.  History of severe rheumatoid arthritis for which he is on Plaquenil.  Rheumatology did  "place him on prednisone that has been helpful.  Stable    3.  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    4.  Family history of lung cancer in a brother in his 80s and he  at 88 with lung cancer.  Second brother had throat cancer in the 70s and  at 80 with \"cancer.  Third brother had bladder cancer and fourth brother had prostate cancer he had a sister with colon cancer.    Plan  · We will release patient from our office and to follow-up with Dr. Cedillo.    Rasheeda Novoa MD      CC:  Tej Cedillo MD  "

## 2020-12-31 ENCOUNTER — APPOINTMENT (OUTPATIENT)
Dept: GENERAL RADIOLOGY | Facility: HOSPITAL | Age: 85
End: 2020-12-31

## 2020-12-31 ENCOUNTER — HOSPITAL ENCOUNTER (OUTPATIENT)
Facility: HOSPITAL | Age: 85
Setting detail: OBSERVATION
Discharge: HOME | End: 2021-01-01
Attending: EMERGENCY MEDICINE | Admitting: INTERNAL MEDICINE

## 2020-12-31 DIAGNOSIS — U07.1 COVID-19 VIRUS INFECTION: ICD-10-CM

## 2020-12-31 DIAGNOSIS — J18.9 PNEUMONIA OF LEFT LUNG DUE TO INFECTIOUS ORGANISM, UNSPECIFIED PART OF LUNG: Primary | ICD-10-CM

## 2020-12-31 DIAGNOSIS — R53.1 GENERALIZED WEAKNESS: ICD-10-CM

## 2020-12-31 PROBLEM — S06.9XAA TBI (TRAUMATIC BRAIN INJURY): Status: ACTIVE | Noted: 2020-12-31

## 2020-12-31 LAB
ALBUMIN SERPL-MCNC: 3.7 G/DL (ref 3.5–5.2)
ALBUMIN/GLOB SERPL: 1.3 G/DL
ALP SERPL-CCNC: 69 U/L (ref 39–117)
ALT SERPL W P-5'-P-CCNC: 22 U/L (ref 1–41)
ANION GAP SERPL CALCULATED.3IONS-SCNC: 11.3 MMOL/L (ref 5–15)
AST SERPL-CCNC: 38 U/L (ref 1–40)
B PARAPERT DNA SPEC QL NAA+PROBE: NOT DETECTED
B PERT DNA SPEC QL NAA+PROBE: NOT DETECTED
BASOPHILS # BLD AUTO: 0.02 10*3/MM3 (ref 0–0.2)
BASOPHILS NFR BLD AUTO: 0.3 % (ref 0–1.5)
BILIRUB SERPL-MCNC: 0.5 MG/DL (ref 0–1.2)
BILIRUB UR QL STRIP: NEGATIVE
BUN SERPL-MCNC: 15 MG/DL (ref 8–23)
BUN/CREAT SERPL: 17 (ref 7–25)
C PNEUM DNA NPH QL NAA+NON-PROBE: NOT DETECTED
CALCIUM SPEC-SCNC: 8.6 MG/DL (ref 8.6–10.5)
CHLORIDE SERPL-SCNC: 103 MMOL/L (ref 98–107)
CLARITY UR: CLEAR
CO2 SERPL-SCNC: 21.7 MMOL/L (ref 22–29)
COLOR UR: YELLOW
CREAT SERPL-MCNC: 0.88 MG/DL (ref 0.76–1.27)
D DIMER PPP FEU-MCNC: 1.02 MCGFEU/ML (ref 0–0.49)
D-LACTATE SERPL-SCNC: 1.1 MMOL/L (ref 0.5–2)
DEPRECATED RDW RBC AUTO: 43.7 FL (ref 37–54)
EOSINOPHIL # BLD AUTO: 0 10*3/MM3 (ref 0–0.4)
EOSINOPHIL NFR BLD AUTO: 0 % (ref 0.3–6.2)
ERYTHROCYTE [DISTWIDTH] IN BLOOD BY AUTOMATED COUNT: 12.5 % (ref 12.3–15.4)
FERRITIN SERPL-MCNC: 1499 NG/ML (ref 30–400)
FLUAV SUBTYP SPEC NAA+PROBE: NOT DETECTED
FLUBV RNA ISLT QL NAA+PROBE: NOT DETECTED
GFR SERPL CREATININE-BSD FRML MDRD: 83 ML/MIN/1.73
GLOBULIN UR ELPH-MCNC: 2.9 GM/DL
GLUCOSE SERPL-MCNC: 115 MG/DL (ref 65–99)
GLUCOSE UR STRIP-MCNC: NEGATIVE MG/DL
HADV DNA SPEC NAA+PROBE: NOT DETECTED
HCOV 229E RNA SPEC QL NAA+PROBE: NOT DETECTED
HCOV HKU1 RNA SPEC QL NAA+PROBE: NOT DETECTED
HCOV NL63 RNA SPEC QL NAA+PROBE: NOT DETECTED
HCOV OC43 RNA SPEC QL NAA+PROBE: NOT DETECTED
HCT VFR BLD AUTO: 39.9 % (ref 37.5–51)
HGB BLD-MCNC: 12.9 G/DL (ref 13–17.7)
HGB UR QL STRIP.AUTO: NEGATIVE
HMPV RNA NPH QL NAA+NON-PROBE: NOT DETECTED
HPIV1 RNA SPEC QL NAA+PROBE: NOT DETECTED
HPIV2 RNA SPEC QL NAA+PROBE: NOT DETECTED
HPIV3 RNA NPH QL NAA+PROBE: NOT DETECTED
HPIV4 P GENE NPH QL NAA+PROBE: NOT DETECTED
INR PPP: 1.02 (ref 0.9–1.1)
KETONES UR QL STRIP: NEGATIVE
LDH SERPL-CCNC: 280 U/L (ref 135–225)
LEUKOCYTE ESTERASE UR QL STRIP.AUTO: NEGATIVE
LIPASE SERPL-CCNC: 48 U/L (ref 13–60)
LYMPHOCYTES # BLD AUTO: 0.37 10*3/MM3 (ref 0.7–3.1)
LYMPHOCYTES NFR BLD AUTO: 5.7 % (ref 19.6–45.3)
M PNEUMO IGG SER IA-ACNC: NOT DETECTED
MCH RBC QN AUTO: 30.7 PG (ref 26.6–33)
MCHC RBC AUTO-ENTMCNC: 32.3 G/DL (ref 31.5–35.7)
MCV RBC AUTO: 95 FL (ref 79–97)
MONOCYTES # BLD AUTO: 0.52 10*3/MM3 (ref 0.1–0.9)
MONOCYTES NFR BLD AUTO: 8 % (ref 5–12)
NEUTROPHILS NFR BLD AUTO: 5.56 10*3/MM3 (ref 1.7–7)
NEUTROPHILS NFR BLD AUTO: 85.7 % (ref 42.7–76)
NITRITE UR QL STRIP: NEGATIVE
NT-PROBNP SERPL-MCNC: 255.2 PG/ML (ref 0–1800)
PH UR STRIP.AUTO: 6 [PH] (ref 5–8)
PLATELET # BLD AUTO: 158 10*3/MM3 (ref 140–450)
PMV BLD AUTO: 10 FL (ref 6–12)
POTASSIUM SERPL-SCNC: 4.5 MMOL/L (ref 3.5–5.2)
PROCALCITONIN SERPL-MCNC: 0.09 NG/ML (ref 0–0.25)
PROCALCITONIN SERPL-MCNC: 0.24 NG/ML (ref 0–0.25)
PROT SERPL-MCNC: 6.6 G/DL (ref 6–8.5)
PROT UR QL STRIP: NEGATIVE
PROTHROMBIN TIME: 13.2 SECONDS (ref 11.7–14.2)
RBC # BLD AUTO: 4.2 10*6/MM3 (ref 4.14–5.8)
RHINOVIRUS RNA SPEC NAA+PROBE: NOT DETECTED
RSV RNA NPH QL NAA+NON-PROBE: NOT DETECTED
SARS-COV-2 RNA NPH QL NAA+NON-PROBE: DETECTED
SODIUM SERPL-SCNC: 136 MMOL/L (ref 136–145)
SP GR UR STRIP: <=1.005 (ref 1–1.03)
TROPONIN T SERPL-MCNC: <0.01 NG/ML (ref 0–0.03)
UROBILINOGEN UR QL STRIP: NORMAL
WBC # BLD AUTO: 6.49 10*3/MM3 (ref 3.4–10.8)

## 2020-12-31 PROCEDURE — 71045 X-RAY EXAM CHEST 1 VIEW: CPT

## 2020-12-31 PROCEDURE — 87040 BLOOD CULTURE FOR BACTERIA: CPT | Performed by: EMERGENCY MEDICINE

## 2020-12-31 PROCEDURE — 85025 COMPLETE CBC W/AUTO DIFF WBC: CPT | Performed by: EMERGENCY MEDICINE

## 2020-12-31 PROCEDURE — 84484 ASSAY OF TROPONIN QUANT: CPT | Performed by: EMERGENCY MEDICINE

## 2020-12-31 PROCEDURE — 0202U NFCT DS 22 TRGT SARS-COV-2: CPT | Performed by: EMERGENCY MEDICINE

## 2020-12-31 PROCEDURE — G0378 HOSPITAL OBSERVATION PER HR: HCPCS

## 2020-12-31 PROCEDURE — 84145 PROCALCITONIN (PCT): CPT | Performed by: EMERGENCY MEDICINE

## 2020-12-31 PROCEDURE — 99285 EMERGENCY DEPT VISIT HI MDM: CPT

## 2020-12-31 PROCEDURE — 96372 THER/PROPH/DIAG INJ SC/IM: CPT

## 2020-12-31 PROCEDURE — 92610 EVALUATE SWALLOWING FUNCTION: CPT

## 2020-12-31 PROCEDURE — 82728 ASSAY OF FERRITIN: CPT | Performed by: NURSE PRACTITIONER

## 2020-12-31 PROCEDURE — 85379 FIBRIN DEGRADATION QUANT: CPT | Performed by: NURSE PRACTITIONER

## 2020-12-31 PROCEDURE — 81003 URINALYSIS AUTO W/O SCOPE: CPT | Performed by: EMERGENCY MEDICINE

## 2020-12-31 PROCEDURE — 83605 ASSAY OF LACTIC ACID: CPT | Performed by: EMERGENCY MEDICINE

## 2020-12-31 PROCEDURE — 80053 COMPREHEN METABOLIC PANEL: CPT | Performed by: EMERGENCY MEDICINE

## 2020-12-31 PROCEDURE — 96361 HYDRATE IV INFUSION ADD-ON: CPT

## 2020-12-31 PROCEDURE — 85610 PROTHROMBIN TIME: CPT | Performed by: EMERGENCY MEDICINE

## 2020-12-31 PROCEDURE — 25010000002 ENOXAPARIN PER 10 MG: Performed by: NURSE PRACTITIONER

## 2020-12-31 PROCEDURE — 96365 THER/PROPH/DIAG IV INF INIT: CPT

## 2020-12-31 PROCEDURE — 83880 ASSAY OF NATRIURETIC PEPTIDE: CPT | Performed by: EMERGENCY MEDICINE

## 2020-12-31 PROCEDURE — 96368 THER/DIAG CONCURRENT INF: CPT

## 2020-12-31 PROCEDURE — 84145 PROCALCITONIN (PCT): CPT | Performed by: NURSE PRACTITIONER

## 2020-12-31 PROCEDURE — 83615 LACTATE (LD) (LDH) ENZYME: CPT | Performed by: NURSE PRACTITIONER

## 2020-12-31 PROCEDURE — 25010000003 CEFTRIAXONE PER 250 MG: Performed by: EMERGENCY MEDICINE

## 2020-12-31 PROCEDURE — 83690 ASSAY OF LIPASE: CPT | Performed by: EMERGENCY MEDICINE

## 2020-12-31 RX ORDER — ACETAMINOPHEN 650 MG/1
650 SUPPOSITORY RECTAL EVERY 4 HOURS PRN
Status: DISCONTINUED | OUTPATIENT
Start: 2020-12-31 | End: 2021-01-01

## 2020-12-31 RX ORDER — ACETAMINOPHEN 500 MG
TABLET ORAL
Status: COMPLETED
Start: 2020-12-31 | End: 2020-12-31

## 2020-12-31 RX ORDER — ACETAMINOPHEN 325 MG/1
650 TABLET ORAL EVERY 4 HOURS PRN
Status: DISCONTINUED | OUTPATIENT
Start: 2020-12-31 | End: 2021-01-01

## 2020-12-31 RX ORDER — ACETAMINOPHEN 160 MG/5ML
650 SOLUTION ORAL EVERY 4 HOURS PRN
Status: DISCONTINUED | OUTPATIENT
Start: 2020-12-31 | End: 2021-01-01

## 2020-12-31 RX ORDER — LEVETIRACETAM 500 MG/1
500 TABLET ORAL DAILY
Status: DISCONTINUED | OUTPATIENT
Start: 2020-12-31 | End: 2021-01-01 | Stop reason: HOSPADM

## 2020-12-31 RX ORDER — SODIUM CHLORIDE 9 MG/ML
100 INJECTION, SOLUTION INTRAVENOUS CONTINUOUS
Status: DISCONTINUED | OUTPATIENT
Start: 2020-12-31 | End: 2021-01-01

## 2020-12-31 RX ORDER — SODIUM CHLORIDE 0.9 % (FLUSH) 0.9 %
10 SYRINGE (ML) INJECTION AS NEEDED
Status: DISCONTINUED | OUTPATIENT
Start: 2020-12-31 | End: 2021-01-01 | Stop reason: HOSPADM

## 2020-12-31 RX ORDER — DIPHENOXYLATE HYDROCHLORIDE AND ATROPINE SULFATE 2.5; .025 MG/1; MG/1
1 TABLET ORAL DAILY
Status: DISCONTINUED | OUTPATIENT
Start: 2020-12-31 | End: 2021-01-01 | Stop reason: HOSPADM

## 2020-12-31 RX ORDER — SODIUM CHLORIDE 0.9 % (FLUSH) 0.9 %
10 SYRINGE (ML) INJECTION EVERY 12 HOURS SCHEDULED
Status: DISCONTINUED | OUTPATIENT
Start: 2020-12-31 | End: 2021-01-01 | Stop reason: HOSPADM

## 2020-12-31 RX ORDER — CEFTRIAXONE SODIUM 2 G/50ML
2 INJECTION, SOLUTION INTRAVENOUS ONCE
Status: COMPLETED | OUTPATIENT
Start: 2020-12-31 | End: 2020-12-31

## 2020-12-31 RX ORDER — HYDROXYCHLOROQUINE SULFATE 200 MG/1
200 TABLET, FILM COATED ORAL 2 TIMES DAILY
Status: DISCONTINUED | OUTPATIENT
Start: 2020-12-31 | End: 2021-01-01 | Stop reason: HOSPADM

## 2020-12-31 RX ORDER — ERGOCALCIFEROL 1.25 MG/1
50000 CAPSULE ORAL
COMMUNITY
Start: 2020-09-29 | End: 2022-09-08

## 2020-12-31 RX ORDER — ACETAMINOPHEN 500 MG
1000 TABLET ORAL ONCE
Status: COMPLETED | OUTPATIENT
Start: 2020-12-31 | End: 2020-12-31

## 2020-12-31 RX ORDER — CETIRIZINE HYDROCHLORIDE 10 MG/1
10 TABLET ORAL DAILY
Status: ON HOLD | COMMUNITY
End: 2021-01-01 | Stop reason: SDUPTHER

## 2020-12-31 RX ORDER — ONDANSETRON 4 MG/1
4 TABLET, FILM COATED ORAL EVERY 6 HOURS PRN
Status: DISCONTINUED | OUTPATIENT
Start: 2020-12-31 | End: 2021-01-01 | Stop reason: HOSPADM

## 2020-12-31 RX ORDER — ONDANSETRON 2 MG/ML
4 INJECTION INTRAMUSCULAR; INTRAVENOUS EVERY 6 HOURS PRN
Status: DISCONTINUED | OUTPATIENT
Start: 2020-12-31 | End: 2021-01-01 | Stop reason: HOSPADM

## 2020-12-31 RX ORDER — TAMSULOSIN HYDROCHLORIDE 0.4 MG/1
0.4 CAPSULE ORAL NIGHTLY
Status: DISCONTINUED | OUTPATIENT
Start: 2020-12-31 | End: 2021-01-01 | Stop reason: HOSPADM

## 2020-12-31 RX ORDER — NITROGLYCERIN 0.4 MG/1
0.4 TABLET SUBLINGUAL
Status: DISCONTINUED | OUTPATIENT
Start: 2020-12-31 | End: 2021-01-01 | Stop reason: HOSPADM

## 2020-12-31 RX ADMIN — DOXYCYCLINE 100 MG: 100 INJECTION, POWDER, LYOPHILIZED, FOR SOLUTION INTRAVENOUS at 11:35

## 2020-12-31 RX ADMIN — SODIUM CHLORIDE, PRESERVATIVE FREE 10 ML: 5 INJECTION INTRAVENOUS at 20:56

## 2020-12-31 RX ADMIN — LEVETIRACETAM 500 MG: 500 TABLET, FILM COATED ORAL at 17:06

## 2020-12-31 RX ADMIN — ACETAMINOPHEN 1000 MG: 500 TABLET ORAL at 10:10

## 2020-12-31 RX ADMIN — MUPIROCIN: 20 OINTMENT TOPICAL at 20:56

## 2020-12-31 RX ADMIN — CEFTRIAXONE SODIUM 2 G: 2 INJECTION, SOLUTION INTRAVENOUS at 11:37

## 2020-12-31 RX ADMIN — TAMSULOSIN HYDROCHLORIDE 0.4 MG: 0.4 CAPSULE ORAL at 20:56

## 2020-12-31 RX ADMIN — Medication 1000 MG: at 10:10

## 2020-12-31 RX ADMIN — HYDROXYCHLOROQUINE SULFATE 200 MG: 200 TABLET ORAL at 20:56

## 2020-12-31 RX ADMIN — SODIUM CHLORIDE 125 ML/HR: 9 INJECTION, SOLUTION INTRAVENOUS at 10:33

## 2020-12-31 RX ADMIN — ENOXAPARIN SODIUM 40 MG: 40 INJECTION SUBCUTANEOUS at 17:06

## 2020-12-31 RX ADMIN — Medication 1 TABLET: at 17:06

## 2021-01-01 ENCOUNTER — READMISSION MANAGEMENT (OUTPATIENT)
Dept: CALL CENTER | Facility: HOSPITAL | Age: 86
End: 2021-01-01

## 2021-01-01 VITALS
BODY MASS INDEX: 24.17 KG/M2 | OXYGEN SATURATION: 96 % | SYSTOLIC BLOOD PRESSURE: 145 MMHG | HEART RATE: 82 BPM | HEIGHT: 67 IN | WEIGHT: 154 LBS | TEMPERATURE: 97.8 F | DIASTOLIC BLOOD PRESSURE: 91 MMHG | RESPIRATION RATE: 20 BRPM

## 2021-01-01 PROBLEM — U07.1 PNEUMONIA DUE TO COVID-19 VIRUS: Status: ACTIVE | Noted: 2021-01-01

## 2021-01-01 PROBLEM — R11.2 NAUSEA & VOMITING: Status: ACTIVE | Noted: 2021-01-01

## 2021-01-01 PROBLEM — J12.82 PNEUMONIA DUE TO COVID-19 VIRUS: Status: ACTIVE | Noted: 2021-01-01

## 2021-01-01 PROBLEM — E86.0 DEHYDRATION: Status: RESOLVED | Noted: 2021-01-01 | Resolved: 2021-01-01

## 2021-01-01 PROBLEM — R11.2 NAUSEA & VOMITING: Status: RESOLVED | Noted: 2021-01-01 | Resolved: 2021-01-01

## 2021-01-01 PROBLEM — E86.0 DEHYDRATION: Status: ACTIVE | Noted: 2021-01-01

## 2021-01-01 LAB
ALBUMIN SERPL-MCNC: 3.4 G/DL (ref 3.5–5.2)
ALBUMIN/GLOB SERPL: 1.4 G/DL
ALP SERPL-CCNC: 60 U/L (ref 39–117)
ALT SERPL W P-5'-P-CCNC: 22 U/L (ref 1–41)
ANION GAP SERPL CALCULATED.3IONS-SCNC: 7.5 MMOL/L (ref 5–15)
AST SERPL-CCNC: 34 U/L (ref 1–40)
BASOPHILS # BLD AUTO: 0.02 10*3/MM3 (ref 0–0.2)
BASOPHILS NFR BLD AUTO: 0.4 % (ref 0–1.5)
BILIRUB SERPL-MCNC: 0.5 MG/DL (ref 0–1.2)
BUN SERPL-MCNC: 11 MG/DL (ref 8–23)
BUN/CREAT SERPL: 15.5 (ref 7–25)
CALCIUM SPEC-SCNC: 7.9 MG/DL (ref 8.6–10.5)
CHLORIDE SERPL-SCNC: 107 MMOL/L (ref 98–107)
CK SERPL-CCNC: 301 U/L (ref 20–200)
CO2 SERPL-SCNC: 21.5 MMOL/L (ref 22–29)
CREAT SERPL-MCNC: 0.71 MG/DL (ref 0.76–1.27)
CRP SERPL-MCNC: 9.48 MG/DL (ref 0–0.5)
D DIMER PPP FEU-MCNC: 0.79 MCGFEU/ML (ref 0–0.49)
DEPRECATED RDW RBC AUTO: 41.3 FL (ref 37–54)
EOSINOPHIL # BLD AUTO: 0.01 10*3/MM3 (ref 0–0.4)
EOSINOPHIL NFR BLD AUTO: 0.2 % (ref 0.3–6.2)
ERYTHROCYTE [DISTWIDTH] IN BLOOD BY AUTOMATED COUNT: 12.2 % (ref 12.3–15.4)
FERRITIN SERPL-MCNC: 1409 NG/ML (ref 30–400)
FIBRINOGEN PPP-MCNC: 595 MG/DL (ref 219–464)
GFR SERPL CREATININE-BSD FRML MDRD: 106 ML/MIN/1.73
GLOBULIN UR ELPH-MCNC: 2.5 GM/DL
GLUCOSE SERPL-MCNC: 114 MG/DL (ref 65–99)
HCT VFR BLD AUTO: 35.1 % (ref 37.5–51)
HGB BLD-MCNC: 11.9 G/DL (ref 13–17.7)
IMM GRANULOCYTES # BLD AUTO: 0.03 10*3/MM3 (ref 0–0.05)
IMM GRANULOCYTES NFR BLD AUTO: 0.6 % (ref 0–0.5)
LDH SERPL-CCNC: 268 U/L (ref 135–225)
LYMPHOCYTES # BLD AUTO: 0.72 10*3/MM3 (ref 0.7–3.1)
LYMPHOCYTES NFR BLD AUTO: 13.5 % (ref 19.6–45.3)
MCH RBC QN AUTO: 31.6 PG (ref 26.6–33)
MCHC RBC AUTO-ENTMCNC: 33.9 G/DL (ref 31.5–35.7)
MCV RBC AUTO: 93.4 FL (ref 79–97)
MONOCYTES # BLD AUTO: 0.44 10*3/MM3 (ref 0.1–0.9)
MONOCYTES NFR BLD AUTO: 8.2 % (ref 5–12)
NEUTROPHILS NFR BLD AUTO: 4.13 10*3/MM3 (ref 1.7–7)
NEUTROPHILS NFR BLD AUTO: 77.1 % (ref 42.7–76)
NRBC BLD AUTO-RTO: 0 /100 WBC (ref 0–0.2)
PLATELET # BLD AUTO: 124 10*3/MM3 (ref 140–450)
PMV BLD AUTO: 10.2 FL (ref 6–12)
POTASSIUM SERPL-SCNC: 3.6 MMOL/L (ref 3.5–5.2)
PROT SERPL-MCNC: 5.9 G/DL (ref 6–8.5)
RBC # BLD AUTO: 3.76 10*6/MM3 (ref 4.14–5.8)
SODIUM SERPL-SCNC: 136 MMOL/L (ref 136–145)
WBC # BLD AUTO: 5.35 10*3/MM3 (ref 3.4–10.8)

## 2021-01-01 PROCEDURE — 85384 FIBRINOGEN ACTIVITY: CPT | Performed by: NURSE PRACTITIONER

## 2021-01-01 PROCEDURE — 86140 C-REACTIVE PROTEIN: CPT | Performed by: NURSE PRACTITIONER

## 2021-01-01 PROCEDURE — 82550 ASSAY OF CK (CPK): CPT | Performed by: NURSE PRACTITIONER

## 2021-01-01 PROCEDURE — 82728 ASSAY OF FERRITIN: CPT | Performed by: NURSE PRACTITIONER

## 2021-01-01 PROCEDURE — 83615 LACTATE (LD) (LDH) ENZYME: CPT | Performed by: NURSE PRACTITIONER

## 2021-01-01 PROCEDURE — G0378 HOSPITAL OBSERVATION PER HR: HCPCS

## 2021-01-01 PROCEDURE — 80053 COMPREHEN METABOLIC PANEL: CPT | Performed by: NURSE PRACTITIONER

## 2021-01-01 PROCEDURE — 85379 FIBRIN DEGRADATION QUANT: CPT | Performed by: NURSE PRACTITIONER

## 2021-01-01 PROCEDURE — 85025 COMPLETE CBC W/AUTO DIFF WBC: CPT | Performed by: NURSE PRACTITIONER

## 2021-01-01 RX ORDER — ACETAMINOPHEN 500 MG
1000 TABLET ORAL EVERY 6 HOURS PRN
Status: DISCONTINUED | OUTPATIENT
Start: 2021-01-01 | End: 2021-01-01 | Stop reason: HOSPADM

## 2021-01-01 RX ORDER — ACETAMINOPHEN 500 MG
1000 TABLET ORAL EVERY 6 HOURS PRN
Start: 2021-01-01

## 2021-01-01 RX ORDER — LOPERAMIDE HYDROCHLORIDE 2 MG/1
2 CAPSULE ORAL 2 TIMES DAILY PRN
Status: DISCONTINUED | OUTPATIENT
Start: 2021-01-01 | End: 2021-01-01 | Stop reason: HOSPADM

## 2021-01-01 RX ORDER — CETIRIZINE HYDROCHLORIDE 10 MG/1
5 TABLET ORAL DAILY
Start: 2021-01-01

## 2021-01-01 RX ORDER — LOPERAMIDE HYDROCHLORIDE 2 MG/1
2 CAPSULE ORAL 2 TIMES DAILY PRN
Start: 2021-01-01 | End: 2023-02-21

## 2021-01-01 RX ORDER — ONDANSETRON 4 MG/1
4 TABLET, FILM COATED ORAL EVERY 8 HOURS PRN
Qty: 15 TABLET | Refills: 0 | Status: SHIPPED | OUTPATIENT
Start: 2021-01-01 | End: 2023-02-21

## 2021-01-01 RX ORDER — ACETAMINOPHEN 650 MG/1
650 SUPPOSITORY RECTAL EVERY 4 HOURS PRN
Status: DISCONTINUED | OUTPATIENT
Start: 2021-01-01 | End: 2021-01-01 | Stop reason: HOSPADM

## 2021-01-01 RX ORDER — ACETAMINOPHEN 160 MG/5ML
650 SOLUTION ORAL EVERY 4 HOURS PRN
Status: DISCONTINUED | OUTPATIENT
Start: 2021-01-01 | End: 2021-01-01 | Stop reason: HOSPADM

## 2021-01-01 RX ADMIN — MUPIROCIN: 20 OINTMENT TOPICAL at 08:35

## 2021-01-01 RX ADMIN — LEVETIRACETAM 500 MG: 500 TABLET, FILM COATED ORAL at 08:34

## 2021-01-01 RX ADMIN — SODIUM CHLORIDE, PRESERVATIVE FREE 10 ML: 5 INJECTION INTRAVENOUS at 08:35

## 2021-01-01 RX ADMIN — Medication 1 TABLET: at 08:34

## 2021-01-01 RX ADMIN — HYDROXYCHLOROQUINE SULFATE 200 MG: 200 TABLET ORAL at 08:34

## 2021-01-01 NOTE — PLAN OF CARE
Goal Outcome Evaluation:  Plan of Care Reviewed With: patient  Progress: improving       Patient medically stable for discharge all care plan points met at present time

## 2021-01-01 NOTE — PROGRESS NOTES
"Adult Nutrition  Assessment/PES    Patient Name:  Juan C Ca  YOB: 1935  MRN: [id number]  Admit Date:  12/31/2020    Assessment Date:  1/1/2021    Comments:  Nutrition screen completed. Pt on Msoft chopped meats with good po intake.Will remain available as needed.    Reason for Assessment     Row Name 01/01/21 0949          Reason for Assessment    Reason For Assessment  identified at risk by screening criteria     Diagnosis  -- COVID 19 pneumonia, TBI, HTN     Identified At Risk by Screening Criteria  difficulty chewing/swallowing         Nutrition/Diet History     Row Name 01/01/21 0949          Nutrition/Diet History    Typical Food/Fluid Intake  diet per speech. po good         Anthropometrics     Row Name 01/01/21 0950          Anthropometrics    Height  170.2 cm (67.01\")     Weight  69.9 kg (154 lb) not weighed by RD        Ideal Body Weight (IBW)    Ideal Body Weight (IBW) (kg)  68.12     % Ideal Body Weight  102.55        Body Mass Index (BMI)    BMI (kg/m2)  24.16     BMI Assessment  BMI 18.5-24.9: normal         Labs/Tests/Procedures/Meds     Row Name 01/01/21 0950          Labs/Procedures/Meds    Lab Results Reviewed  reviewed        Diagnostic Tests/Procedures    Diagnostic Test/Procedure Reviewed  reviewed        Medications    Pertinent Medications Reviewed  reviewed     Pertinent Medications Comments  lovenox, keppra, MVI,         Physical Findings     Row Name 01/01/21 0950          Physical Findings    Skin  -- lynn 20         Estimated/Assessed Needs     Row Name 01/01/21 0950          Calculation Measurements    Height  170.2 cm (67.01\")         Nutrition Prescription Ordered     Row Name 01/01/21 0915          Nutrition Prescription PO    Current PO Diet  Soft Texture     Texture  Chopped     Common Modifiers  Cardiac         Evaluation of Received Nutrient/Fluid Intake     Row Name 01/01/21 0906          PO Evaluation    % PO Intake  100%     "           Problem/Interventions:  Problem 1     Row Name 01/01/21 0952          Nutrition Diagnoses Problem 1    Problem 1  Biting/Chewing Difficulty     Etiology (related to)  Medical Diagnosis     Signs/Symptoms (evidenced by)  SLP/Swallow eval     Swallow eval status  Done               Intervention Goal     Row Name 01/01/21 0952          Intervention Goal    General  Maintain nutrition     PO  Tolerate PO;Maintain intake     Weight  Maintain weight         Nutrition Intervention     Row Name 01/01/21 0952          Nutrition Intervention    RD/Tech Action  Follow Tx progress;Care plan reviewd           Education/Evaluation     Row Name 01/01/21 0953          Education    Education  Will Instruct as appropriate        Monitor/Evaluation    Monitor  Per protocol           Electronically signed by:  Cassandra Dawkins RD  01/01/21 09:54 EST

## 2021-01-01 NOTE — SIGNIFICANT NOTE
01/01/21 1148   OTHER   Discipline physical therapist   Rehab Time/Intention   Session Not Performed other (see comments)  (Pt is up in room with SBA of staff, discharging today with all d/c needs met. Pts nurse states he is somewhat weak but this is likely attributed to just being ill, no acute PT needs at this time, will sign off.)

## 2021-01-01 NOTE — OUTREACH NOTE
Prep Survey      Responses   [facility] facility patient discharged from?  [place]   Is LACE score < 7 ?  Yes   Emergency Room discharge w/ pulse ox?  No   Eligibility  Readm Mgmt   Discharge diagnosis  **COVID-19 virus detected Pneumonia due to COVID-19 virus    Does the patient have one of the following disease processes/diagnoses(primary or secondary)?  COVID-19   Does the patient have Home health ordered?  No   Is there a DME ordered?  No   Prep survey completed?  Yes          Mandy Guthrie RN

## 2021-01-01 NOTE — PLAN OF CARE
Problem: Fall Injury Risk  Goal: Absence of Fall and Fall-Related Injury  Outcome: Ongoing, Progressing  Intervention: Promote Injury-Free Environment  Recent Flowsheet Documentation  Taken 1/1/2021 0245 by Arielle Krishnamurthy RN  Safety Promotion/Fall Prevention: safety round/check completed  Taken 1/1/2021 0000 by Arielle Krishnamurthy RN  Safety Promotion/Fall Prevention: safety round/check completed  Taken 12/31/2020 2040 by Arielle Krishnamurthy RN  Safety Promotion/Fall Prevention: safety round/check completed     Problem: Adult Inpatient Plan of Care  Goal: Plan of Care Review  Outcome: Ongoing, Progressing  Flowsheets  Taken 1/1/2021 0430 by Arielle Krishnamurhty RN  Progress: improving  Outcome Summary: VSS. Sats in mid to high 90s on RA. Pt had 2 episodes of loose stool. Nursing will continue to monitor.  Taken 12/31/2020 1549 by Jessica Wilkerson MS CCC-SLP  Plan of Care Reviewed With: patient  Goal: Patient-Specific Goal (Individualized)  Outcome: Ongoing, Progressing  Goal: Absence of Hospital-Acquired Illness or Injury  Outcome: Ongoing, Progressing  Intervention: Identify and Manage Fall Risk  Recent Flowsheet Documentation  Taken 1/1/2021 0245 by Arielle Krishnamurthy RN  Safety Promotion/Fall Prevention: safety round/check completed  Taken 1/1/2021 0000 by Arielle rKishnamurthy RN  Safety Promotion/Fall Prevention: safety round/check completed  Taken 12/31/2020 2040 by Arielle Krishnamurthy RN  Safety Promotion/Fall Prevention: safety round/check completed  Intervention: Prevent Skin Injury  Recent Flowsheet Documentation  Taken 1/1/2021 0245 by Arielle Krishnamurthy RN  Body Position: position changed independently  Taken 1/1/2021 0000 by Arielle Krishnamurthy RN  Body Position: position changed independently  Taken 12/31/2020 2040 by Arielle Krishnamurthy RN  Body Position: position changed independently  Goal: Optimal Comfort and Wellbeing  Outcome: Ongoing, Progressing  Goal: Readiness for Transition of Care  Outcome: Ongoing, Progressing   Goal Outcome Evaluation:  Plan of  Care Reviewed With: patient  Progress: improving  Outcome Summary: VSS. Sats in mid to high 90s on RA. Pt had 2 episodes of loose stool. Nursing will continue to monitor.

## 2021-01-01 NOTE — DISCHARGE SUMMARY
[facility] Medicine Services  DISCHARGE SUMMARY    Patient Name: Juan C Ca  : 1935  MRN: [id number]    Date of Admission: 2020  9:45 AM  Date of Discharge:  2020  Primary Care Physician: Tej Cedillo Jr., MD    Consults     Date and Time Order Name Status Description    2020 1132 [facility] (on-call MD unless specified) Details Completed           Hospital Course     Presenting Problem:   Generalized weakness [R53.1]  Pneumonia of left lung due to infectious organism, unspecified part of lung [J18.9]  COVID-19 virus infection [U07.1]    Active Hospital Problems    Diagnosis  POA   • **COVID-19 virus detected [U07.1]  Yes   • Pneumonia due to COVID-19 virus [U07.1, J12.82]  Yes   • Dehydration [E86.0]  Yes   • Pneumonia of left lung due to infectious organism [J18.9]  Yes   • TBI (traumatic brain injury) (CMS/[facility]) [S06.9X9A]  Yes   • Anemia of chronic disease [D63.8]  Yes   • Seizures (CMS/[facility]) [R56.9]  Yes   • Hypertension [I10]  Yes   • Hyperlipidemia [E78.5]  Yes   • Rheumatoid arthritis (CMS/[facility]) [M06.9]  Yes   • Iron deficiency anemia [D50.9]  Yes      Resolved Hospital Problems    Diagnosis Date Resolved POA   • Nausea & vomiting [R11.2] 2021 Yes          Hospital Course:  Juan C Ca is a 85 y.o. male who presented to the emergency room with positive COVID-19 infection.  He had nausea vomiting and diarrhea and was unable to take in oral fluids.  He was hospitalized for dehydration and need for IV fluids and monitoring and observation status.  Incidentally his chest x-ray did show pneumonia that was thought to be due to COVID-19.  His procalcitonin was low and further antibacterial antibiotics were not felt to be warranted  after ER evaluation.  Patient received IV fluid and supportive care and symptom treatment.  His nausea and vomiting has now resolved.  He still has some intermittent diarrhea which he was counseled about using as needed Imodium to treat.  He  is eating and drinking well and is requesting discharge home.  Despite pneumonia seen on chest x-ray he has no sign of hypoxia and is 96% on room air oxygen.  He is otherwise felt to be reasonably hemodynamically stable to discharge.  I will prescribe him Zofran as needed for nausea, over-the-counter Tylenol as needed for fever or chills, and Imodium as needed for diarrhea.  He will go home with his wife and he also says he gets care from his 2 daughters that are both nurses.  He believes he may have been infected by one of his grandchildren but is uncertain.  He agrees to return to the hospital if he gets worse and [facility]  He will monitor home pulse oximeter at discharge.  Patient was notified of radiologist report below with radiologist recommendation for short-term repeat chest x-ray to show clearing of infiltrate.  Patient will have this performed with primary care follow-up within the next 1 to 2 months.    At the time of discharge patient was told to take all medications as prescribed, keep all follow-up appointments, and call their doctor or return to the hospital with any worsening or concerning symptoms.    Please note that dragon voice recognition software was used to create this note and that transcription errors are possible.      Day of Discharge     HPI:   Eating some.  Drinking well.  Feels much better today.  Wants to go home.  No other new complaints.    ROS:  No current fevers or chills  No current shortness of breath or cough  Resolved nausea and vomiting, and improved diarrhea.  No current chest pain or palpitations    Vital Signs:   Temp:  [97.4 °F (36.3 °C)-100.6 °F (38.1 °C)] 97.8 °F (36.6 °C)  Heart Rate:  [73-86] 82  Resp:  [20] 20  BP: ()/(59-91) 145/91     Physical Exam:  Constitutional:Awake, alert  HENT: NCAT, mucous membranes moist, neck supple  Respiratory: Clear to auscultation bilaterally, respiratory effort normal, nonlabored breathing    Cardiovascular: RRR, normal radial pulses  Gastrointestinal: Positive bowel sounds, soft, nontender, nondistended  Musculoskeletal: Normal musculature for age, no lower extremity edema  Psychiatric: Appropriate affect, cooperative, conversational  Neurologic: No slurred speech or facial droop, follows commands  Skin: No rashes or jaundice, warm      Pertinent  and/or Most Recent Results     Results from last 7 days   Lab Units 01/01/21  0833 12/31/20  1008   WBC 10*3/mm3 5.35 6.49   HEMOGLOBIN g/dL 11.9* 12.9*   HEMATOCRIT % 35.1* 39.9   PLATELETS 10*3/mm3 124* 158   SODIUM mmol/L 136 136   POTASSIUM mmol/L 3.6 4.5   CHLORIDE mmol/L 107 103   CO2 mmol/L 21.5* 21.7*   BUN mg/dL 11 15   CREATININE mg/dL 0.71* 0.88   GLUCOSE mg/dL 114* 115*   CALCIUM mg/dL 7.9* 8.6     Results from last 7 days   Lab Units 01/01/21  0833 12/31/20  1008   BILIRUBIN mg/dL 0.5 0.5   ALK PHOS U/L 60 69   ALT (SGPT) U/L 22 22   AST (SGOT) U/L 34 38   PROTIME Seconds  --  13.2   INR   --  1.02           Invalid input(s): TG, LDLCALC, LDLREALC  Results from last 7 days   Lab Units 12/31/20  1509 12/31/20  1008   PROBNP pg/mL  --  255.2   TROPONIN T ng/mL  --  <0.010   PROCALCITONIN ng/mL 0.24 0.09   LACTATE mmol/L  --  1.1       Brief Urine Lab Results  (Last result in the past 365 days)      Color   Clarity   Blood   Leuk Est   Nitrite   Protein   CREAT   Urine HCG        12/31/20 1033 Yellow Clear Negative Negative Negative Negative               Microbiology Results Abnormal     Procedure Component Value - Date/Time    Blood Culture - Blood, Arm, Right [[id number]] Collected: 12/31/20 1033    Lab Status: Preliminary result Specimen: Blood from Arm, Right Updated: 01/01/21 1045     Blood Culture No growth at 24 hours    Blood Culture - Blood, Arm, Left [[id number]] Collected: 12/31/20 1008    Lab Status: Preliminary result Specimen: Blood from Arm, Left Updated: 01/01/21 1030     Blood Culture No growth at 24 hours    Respiratory Panel PCR  w/COVID-19(SARS-CoV-2) [facility]/MAULIK/IGOR/PAD/COR/MAD/MICHELLE In-House, NP Swab in UTM/VTM, 3-4 HR TAT - Swab, Nasopharynx [[id number]]  (Abnormal) Collected: 12/31/20 1010    Lab Status: Final result Specimen: Swab from Nasopharynx Updated: 12/31/20 1141     ADENOVIRUS, PCR Not Detected     Coronavirus 229E Not Detected     Coronavirus HKU1 Not Detected     Coronavirus NL63 Not Detected     Coronavirus OC43 Not Detected     COVID19 Detected     Human Metapneumovirus Not Detected     Human Rhinovirus/Enterovirus Not Detected     Influenza A PCR Not Detected     Influenza B PCR Not Detected     Parainfluenza Virus 1 Not Detected     Parainfluenza Virus 2 Not Detected     Parainfluenza Virus 3 Not Detected     Parainfluenza Virus 4 Not Detected     RSV, PCR Not Detected     Bordetella pertussis pcr Not Detected     Bordetella parapertussis PCR Not Detected     Chlamydophila pneumoniae PCR Not Detected     Mycoplasma pneumo by PCR Not Detected    Narrative:      Fact sheet for providers: https://docs.[employer]/wp-content/uploads/CKB6776-3455-TE6.1-EUA-Provider-Fact-Sheet-3.pdf    Fact sheet for patients: https://docs.[employer]/wp-content/uploads/EYT0902-7875-NF6.1-EUA-Patient-Fact-Sheet-1.pdf    Test performed by PCR.          Imaging Results (All)     Procedure Component Value Units Date/Time    XR Chest 1 View [[id number]] Collected: 12/31/20 1035     Updated: 12/31/20 1040    Narrative:      XR CHEST 1 VW-     HISTORY:  Pneumonia.     COMPARISON:  Chest radiograph 06/14/2019.     FINDINGS:    A single portable view of the chest was obtained. The cardiac silhouette  and mediastinal and hilar contours are within normal limits and  unchanged. There is calcific aortic atherosclerosis. There are calcified  lymph nodes. There is a calcified granuloma in the right upper lung.  There is mild patchy air space opacity in the mid/perihilar left lung,  new from 2019, which is suspicious for pneumonia in the appropriate  clinical  setting. Short-term follow-up chest radiographs are  recommended. Pleural spaces are clear. There is multilevel degenerative  disc disease.     This report was finalized on 12/31/2020 10:37 AM by Dr. Charu Pedro M.D.             Results for orders placed during the hospital encounter of 01/10/20   Duplex venous lower extremity bilateral CAR    Narrative · Normal bilateral lower extremity venous duplex scan.          Results for orders placed during the hospital encounter of 01/10/20   Duplex venous lower extremity bilateral CAR    Narrative · Normal bilateral lower extremity venous duplex scan.                  Discharge Details        Discharge Medications      New Medications      Instructions Start Date   acetaminophen 500 MG tablet  Commonly known as: TYLENOL   1,000 mg, Oral, Every 6 Hours PRN      loperamide 2 MG capsule  Commonly known as: IMODIUM   2 mg, Oral, 2 Times Daily PRN, OTC      ondansetron 4 MG tablet  Commonly known as: Zofran   4 mg, Oral, Every 8 Hours PRN         Changes to Medications      Instructions Start Date   cetirizine 10 MG tablet  Commonly known as: zyrTEC  What changed: how much to take   5 mg, Oral, Daily         Continue These Medications      Instructions Start Date   docusate sodium 100 MG capsule   100 mg, Oral, 2 Times Daily      guaiFENesin 100 MG/5ML syrup  Commonly known as: ROBITUSSIN   200 mg, Oral, Nightly PRN      hydroxychloroquine 200 MG tablet  Commonly known as: PLAQUENIL   200 mg, Oral, 2 Times Daily      leflunomide 20 MG tablet  Commonly known as: ARAVA   Daily      levETIRAcetam 500 MG tablet  Commonly known as: KEPPRA   500 mg, Oral, 2 Times Daily      multivitamin tablet tablet  Commonly known as: THERAGRAN   1 tablet, Oral, Daily      mupirocin 2 % ointment  Commonly known as: BACTROBAN   No dose, route, or frequency recorded.      Systane ICaps AREDS2 capsule   Oral      tamsulosin 0.4 MG capsule 24 hr capsule  Commonly known as: FLOMAX   Take 1 capsule by  mouth every night.      vitamin D 1.25 MG (06039 UT) capsule capsule  Commonly known as: ERGOCALCIFEROL   50,000 Units, Oral, Every 14 Days      ZICAM COLD REMEDY PO   1 capsule, Oral, Daily         Stop These Medications    VITAMIN D PO            Allergies   Allergen Reactions   • Sulfadiazine Unknown - High Severity     fever  Other reaction(s): Fever         Discharge Disposition:  Home or Self Care    Diet:  Hospital:  Diet Order   Procedures   • Diet Dysphagia; IV - Mechanical Soft No Mixed Consistencies; Thin; No Straws; Cardiac       Activity:  Activity Instructions     Activity as Tolerated                 CODE STATUS:    Code Status and Medical Interventions:   Ordered at: 12/31/20 1422     Code Status:    CPR     Medical Interventions (Level of Support Prior to Arrest):    Full       Future Appointments   Date Time Provider Department Center   1/18/2021 11:00 AM Jana Jones MD [facility] None       Additional Instructions for the Follow-ups that You Need to Schedule     Discharge Follow-up with PCP   As directed       Currently Documented PCP:    Tej Cedillo Jr., MD    PCP Phone Number:    [phone]     Follow Up Details: 1 week telemedicine follow-up.  Radiologist recommends repeat chest x-ray in 1 to 2 months to show clearing of your Covid pneumonia findings.  Please have recommended repeat chest x-ray with primary care and call the hospitalist service if any issues                     John Mendoza MD  01/01/21      Time Spent on Discharge:  I spent  28  minutes on this discharge activity which included: face-to-face encounter with the patient, reviewing the data in the system, coordination of the care with the nursing staff as well as consultants, documentation, and entering orders.

## 2021-01-02 ENCOUNTER — APPOINTMENT (OUTPATIENT)
Dept: GENERAL RADIOLOGY | Facility: HOSPITAL | Age: 86
End: 2021-01-02

## 2021-01-02 ENCOUNTER — HOSPITAL ENCOUNTER (INPATIENT)
Facility: HOSPITAL | Age: 86
LOS: 4 days | Discharge: HOME HEALTH | End: 2021-01-06
Attending: EMERGENCY MEDICINE | Admitting: HOSPITALIST

## 2021-01-02 ENCOUNTER — READMISSION MANAGEMENT (OUTPATIENT)
Dept: CALL CENTER | Facility: HOSPITAL | Age: 86
End: 2021-01-02

## 2021-01-02 DIAGNOSIS — R09.02 HYPOXIA: ICD-10-CM

## 2021-01-02 DIAGNOSIS — U07.1 PNEUMONIA DUE TO COVID-19 VIRUS: ICD-10-CM

## 2021-01-02 DIAGNOSIS — R50.9 FEVER, UNSPECIFIED FEVER CAUSE: Primary | ICD-10-CM

## 2021-01-02 DIAGNOSIS — U07.1 COVID-19: ICD-10-CM

## 2021-01-02 DIAGNOSIS — J12.82 PNEUMONIA DUE TO COVID-19 VIRUS: ICD-10-CM

## 2021-01-02 DIAGNOSIS — B02.9 HERPES ZOSTER WITHOUT COMPLICATION: ICD-10-CM

## 2021-01-02 LAB
ALBUMIN SERPL-MCNC: 3.6 G/DL (ref 3.5–5.2)
ALBUMIN/GLOB SERPL: 1.2 G/DL
ALP SERPL-CCNC: 64 U/L (ref 39–117)
ALT SERPL W P-5'-P-CCNC: 28 U/L (ref 1–41)
ANION GAP SERPL CALCULATED.3IONS-SCNC: 9.5 MMOL/L (ref 5–15)
AST SERPL-CCNC: 51 U/L (ref 1–40)
BASOPHILS # BLD AUTO: 0.01 10*3/MM3 (ref 0–0.2)
BASOPHILS NFR BLD AUTO: 0.3 % (ref 0–1.5)
BILIRUB SERPL-MCNC: 0.6 MG/DL (ref 0–1.2)
BUN SERPL-MCNC: 11 MG/DL (ref 8–23)
BUN/CREAT SERPL: 12.2 (ref 7–25)
CALCIUM SPEC-SCNC: 8.6 MG/DL (ref 8.6–10.5)
CHLORIDE SERPL-SCNC: 101 MMOL/L (ref 98–107)
CO2 SERPL-SCNC: 23.5 MMOL/L (ref 22–29)
CREAT SERPL-MCNC: 0.9 MG/DL (ref 0.76–1.27)
D-LACTATE SERPL-SCNC: 1 MMOL/L (ref 0.5–2)
DEPRECATED RDW RBC AUTO: 42.5 FL (ref 37–54)
EOSINOPHIL # BLD AUTO: 0 10*3/MM3 (ref 0–0.4)
EOSINOPHIL NFR BLD AUTO: 0 % (ref 0.3–6.2)
ERYTHROCYTE [DISTWIDTH] IN BLOOD BY AUTOMATED COUNT: 12.4 % (ref 12.3–15.4)
GFR SERPL CREATININE-BSD FRML MDRD: 81 ML/MIN/1.73
GLOBULIN UR ELPH-MCNC: 3.1 GM/DL
GLUCOSE SERPL-MCNC: 80 MG/DL (ref 65–99)
HCT VFR BLD AUTO: 33.9 % (ref 37.5–51)
HGB BLD-MCNC: 11.4 G/DL (ref 13–17.7)
IMM GRANULOCYTES # BLD AUTO: 0.01 10*3/MM3 (ref 0–0.05)
IMM GRANULOCYTES NFR BLD AUTO: 0.3 % (ref 0–0.5)
LYMPHOCYTES # BLD AUTO: 0.38 10*3/MM3 (ref 0.7–3.1)
LYMPHOCYTES NFR BLD AUTO: 10.2 % (ref 19.6–45.3)
MCH RBC QN AUTO: 31.7 PG (ref 26.6–33)
MCHC RBC AUTO-ENTMCNC: 33.6 G/DL (ref 31.5–35.7)
MCV RBC AUTO: 94.2 FL (ref 79–97)
MONOCYTES # BLD AUTO: 0.66 10*3/MM3 (ref 0.1–0.9)
MONOCYTES NFR BLD AUTO: 17.7 % (ref 5–12)
NEUTROPHILS NFR BLD AUTO: 2.67 10*3/MM3 (ref 1.7–7)
NEUTROPHILS NFR BLD AUTO: 71.5 % (ref 42.7–76)
NRBC BLD AUTO-RTO: 0 /100 WBC (ref 0–0.2)
PLATELET # BLD AUTO: 119 10*3/MM3 (ref 140–450)
PMV BLD AUTO: 9.5 FL (ref 6–12)
POTASSIUM SERPL-SCNC: 4 MMOL/L (ref 3.5–5.2)
PROT SERPL-MCNC: 6.7 G/DL (ref 6–8.5)
RBC # BLD AUTO: 3.6 10*6/MM3 (ref 4.14–5.8)
SODIUM SERPL-SCNC: 134 MMOL/L (ref 136–145)
WBC # BLD AUTO: 3.73 10*3/MM3 (ref 3.4–10.8)

## 2021-01-02 PROCEDURE — 63710000001 ONDANSETRON ODT 4 MG TABLET DISPERSIBLE: Performed by: EMERGENCY MEDICINE

## 2021-01-02 PROCEDURE — 85025 COMPLETE CBC W/AUTO DIFF WBC: CPT | Performed by: EMERGENCY MEDICINE

## 2021-01-02 PROCEDURE — 87040 BLOOD CULTURE FOR BACTERIA: CPT | Performed by: EMERGENCY MEDICINE

## 2021-01-02 PROCEDURE — 80076 HEPATIC FUNCTION PANEL: CPT | Performed by: HOSPITALIST

## 2021-01-02 PROCEDURE — 83605 ASSAY OF LACTIC ACID: CPT | Performed by: EMERGENCY MEDICINE

## 2021-01-02 PROCEDURE — XW033E5 INTRODUCTION OF REMDESIVIR ANTI-INFECTIVE INTO PERIPHERAL VEIN, PERCUTANEOUS APPROACH, NEW TECHNOLOGY GROUP 5: ICD-10-PCS | Performed by: HOSPITALIST

## 2021-01-02 PROCEDURE — 71045 X-RAY EXAM CHEST 1 VIEW: CPT

## 2021-01-02 PROCEDURE — 82565 ASSAY OF CREATININE: CPT | Performed by: HOSPITALIST

## 2021-01-02 PROCEDURE — 80053 COMPREHEN METABOLIC PANEL: CPT | Performed by: EMERGENCY MEDICINE

## 2021-01-02 PROCEDURE — 84484 ASSAY OF TROPONIN QUANT: CPT | Performed by: HOSPITALIST

## 2021-01-02 PROCEDURE — 99284 EMERGENCY DEPT VISIT MOD MDM: CPT

## 2021-01-02 RX ORDER — ONDANSETRON 4 MG/1
4 TABLET, ORALLY DISINTEGRATING ORAL ONCE
Status: COMPLETED | OUTPATIENT
Start: 2021-01-02 | End: 2021-01-02

## 2021-01-02 RX ORDER — ACETAMINOPHEN 325 MG/1
650 TABLET ORAL EVERY 4 HOURS PRN
Status: DISCONTINUED | OUTPATIENT
Start: 2021-01-02 | End: 2021-01-06 | Stop reason: HOSPADM

## 2021-01-02 RX ORDER — DIPHENOXYLATE HYDROCHLORIDE AND ATROPINE SULFATE 2.5; .025 MG/1; MG/1
1 TABLET ORAL DAILY
Status: DISCONTINUED | OUTPATIENT
Start: 2021-01-03 | End: 2021-01-06 | Stop reason: HOSPADM

## 2021-01-02 RX ORDER — ONDANSETRON 2 MG/ML
4 INJECTION INTRAMUSCULAR; INTRAVENOUS EVERY 4 HOURS PRN
Status: DISCONTINUED | OUTPATIENT
Start: 2021-01-02 | End: 2021-01-06 | Stop reason: HOSPADM

## 2021-01-02 RX ORDER — NITROGLYCERIN 0.4 MG/1
0.4 TABLET SUBLINGUAL
Status: DISCONTINUED | OUTPATIENT
Start: 2021-01-02 | End: 2021-01-06 | Stop reason: HOSPADM

## 2021-01-02 RX ORDER — TAMSULOSIN HYDROCHLORIDE 0.4 MG/1
0.4 CAPSULE ORAL NIGHTLY
Status: DISCONTINUED | OUTPATIENT
Start: 2021-01-03 | End: 2021-01-06 | Stop reason: HOSPADM

## 2021-01-02 RX ORDER — UREA 10 %
3 LOTION (ML) TOPICAL NIGHTLY PRN
Status: DISCONTINUED | OUTPATIENT
Start: 2021-01-02 | End: 2021-01-06 | Stop reason: HOSPADM

## 2021-01-02 RX ORDER — ACETAMINOPHEN 500 MG
1000 TABLET ORAL ONCE
Status: COMPLETED | OUTPATIENT
Start: 2021-01-02 | End: 2021-01-02

## 2021-01-02 RX ORDER — LEVETIRACETAM 500 MG/1
500 TABLET ORAL EVERY 12 HOURS SCHEDULED
Status: DISCONTINUED | OUTPATIENT
Start: 2021-01-03 | End: 2021-01-06 | Stop reason: HOSPADM

## 2021-01-02 RX ORDER — ONDANSETRON 4 MG/1
4 TABLET, FILM COATED ORAL EVERY 4 HOURS PRN
Status: DISCONTINUED | OUTPATIENT
Start: 2021-01-02 | End: 2021-01-06 | Stop reason: HOSPADM

## 2021-01-02 RX ORDER — FAMOTIDINE 20 MG/1
20 TABLET, FILM COATED ORAL DAILY
Status: DISCONTINUED | OUTPATIENT
Start: 2021-01-03 | End: 2021-01-06 | Stop reason: HOSPADM

## 2021-01-02 RX ADMIN — ONDANSETRON 4 MG: 4 TABLET, ORALLY DISINTEGRATING ORAL at 15:08

## 2021-01-02 RX ADMIN — ACETAMINOPHEN 1000 MG: 500 TABLET ORAL at 15:07

## 2021-01-02 NOTE — ED TRIAGE NOTES
Pt arrived from home. Pt was recently dx with covid pneumonia. Pt was dc yesterday and has been running a fever. pts temp upon arrival is 102.4.     Pt was wearing a mask during assessment.  This RN wore appropriate PPE

## 2021-01-02 NOTE — ED PROVIDER NOTES
EMERGENCY DEPARTMENT ENCOUNTER    Room Number:  18/18  Date of encounter:  1/2/2021  PCP: Tej Cedillo Jr., MD  Historian: Patient      HPI:  Chief Complaint: Fever      Context: Juan C Ca is a 85 y.o. male who presents to the ED c/o fever since last night.  He reports he was discharged from this hospital yesterday after being diagnosed with Covid pneumonia.  He reports that he has had some mild nausea this morning.  He developed a fever of 102.  He last took Tylenol last night.  He states he has been able to eat and drink.  Oxygen saturations were found to be normal upon arrival.  Nothing seems to make his symptoms worse or better.  He denies pain.  He reports persistent cough.  He also reports a new rash to his left lateral chest.      PAST MEDICAL HISTORY  Active Ambulatory Problems     Diagnosis Date Noted   • Iron deficiency anemia 04/08/2019   • Adverse effect of iron 05/06/2019   • Febrile illness, acute 06/14/2019   • Hyperlipidemia 06/14/2019   • Hypertension 06/14/2019   • Rheumatoid arthritis (CMS/[facility]) 06/14/2019   • Seizures (CMS/[facility]) 06/14/2019   • Drug induced fever 06/15/2019   • Hypokalemia 06/16/2019   • Anemia of chronic disease 06/17/2019   • Urinary frequency 06/17/2019   • Hypoxia 06/18/2019   • Pneumonia of left lung due to infectious organism 12/31/2020   • COVID-19 virus detected 12/31/2020   • TBI (traumatic brain injury) (CMS/[facility]) 12/31/2020   • Pneumonia due to COVID-19 virus 01/01/2021   • Dehydration 01/01/2021     Resolved Ambulatory Problems     Diagnosis Date Noted   • Nausea & vomiting 01/01/2021     Past Medical History:   Diagnosis Date   • Cancer (CMS/[facility])    • CVD (cerebrovascular disease)    • H/O Traumatic brain injury (CMS/[facility])    • H/O: pneumonia 04/2015         PAST SURGICAL HISTORY  Past Surgical History:   Procedure Laterality Date   • APPENDECTOMY  1950   • CATARACT EXTRACTION     • COLONOSCOPY N/A 12/12/2017    Procedure: COLONOSCOPY TO CECUM AND TI WITH  APC CAUTERY OF RIGHT COLON AVM ;  Surgeon: Lucio Stein MD;  Location: [facility] ENDOSCOPY;  Service:    • ENDOSCOPY N/A 2017    Procedure: ESOPHAGOGASTRODUODENOSCOPY with BX ;  Surgeon: Lucio Stein MD;  Location: [facility] ENDOSCOPY;  Service:          FAMILY HISTORY  Family History   Problem Relation Age of Onset   • Colon cancer Other    • Hypertension Mother    • Mental illness Mother    • Hypertension Father    • Heart disease Sister    • Hypertension Sister    • Colon cancer Sister    • Skin cancer Sister    • Heart disease Brother    • Hypertension Brother    • Lung cancer Brother    • Diabetes Daughter    • Throat cancer Brother    • Cancer Brother         Bladder   • Prostate cancer Brother          SOCIAL HISTORY  Social History     Socioeconomic History   • Marital status:      Spouse name: Abbie   • Number of children: Not on file   • Years of education: College   • Highest education level: Not on file   Occupational History     Employer: RETIRED   Tobacco Use   • Smoking status: Former Smoker     Years: 15.00     Types: Cigarettes     Quit date: 1987     Years since quittin.0   • Smokeless tobacco: Never Used   Substance and Sexual Activity   • Alcohol use: No   • Drug use: No   • Sexual activity: Yes     Partners: Female     Birth control/protection: None         ALLERGIES  Sulfadiazine        REVIEW OF SYSTEMS  Review of Systems   Negative for shortness of breath, negative for chest pain, negative for abdominal pain, negative for vomiting.  Positive for nausea.  All systems reviewed and negative except for those discussed in HPI.       PHYSICAL EXAM    I have reviewed the triage vital signs and nursing notes.    ED Triage Vitals [21 1437]   Temp Heart Rate Resp BP SpO2   (!) 102.4 °F (39.1 °C) 89 16 151/83 96 %      Temp src Heart Rate Source Patient Position BP Location FiO2 (%)   -- Monitor -- -- --       Physical Exam  GENERAL: Awake, alert, pleasant, not distressed.   Febrile.  HENT: nares patent  EYES: no scleral icterus  CV: regular rhythm, regular rate  RESPIRATORY: normal effort.  Left lower lobe rhonchi posteriorly  ABDOMEN: soft, nontender  MUSCULOSKELETAL: no deformity  NEURO: alert, moves all extremities, follows commands  SKIN: warm, dry, left lateral chest with dermatomal pustular rash consistent with shingles.        LAB RESULTS  Recent Results (from the past 24 hour(s))   Comprehensive Metabolic Panel    Collection Time: 01/02/21  3:10 PM    Specimen: Blood   Result Value Ref Range    Glucose 80 65 - 99 mg/dL    BUN 11 8 - 23 mg/dL    Creatinine 0.90 0.76 - 1.27 mg/dL    Sodium 134 (L) 136 - 145 mmol/L    Potassium 4.0 3.5 - 5.2 mmol/L    Chloride 101 98 - 107 mmol/L    CO2 23.5 22.0 - 29.0 mmol/L    Calcium 8.6 8.6 - 10.5 mg/dL    Total Protein 6.7 6.0 - 8.5 g/dL    Albumin 3.60 3.50 - 5.20 g/dL    ALT (SGPT) 28 1 - 41 U/L    AST (SGOT) 51 (H) 1 - 40 U/L    Alkaline Phosphatase 64 39 - 117 U/L    Total Bilirubin 0.6 0.0 - 1.2 mg/dL    eGFR Non African Amer 81 >60 mL/min/1.73    Globulin 3.1 gm/dL    A/G Ratio 1.2 g/dL    BUN/Creatinine Ratio 12.2 7.0 - 25.0    Anion Gap 9.5 5.0 - 15.0 mmol/L   Lactic Acid, Plasma    Collection Time: 01/02/21  3:10 PM    Specimen: Blood   Result Value Ref Range    Lactate 1.0 0.5 - 2.0 mmol/L   CBC Auto Differential    Collection Time: 01/02/21  3:10 PM    Specimen: Blood   Result Value Ref Range    WBC 3.73 3.40 - 10.80 10*3/mm3    RBC 3.60 (L) 4.14 - 5.80 10*6/mm3    Hemoglobin 11.4 (L) 13.0 - 17.7 g/dL    Hematocrit 33.9 (L) 37.5 - 51.0 %    MCV 94.2 79.0 - 97.0 fL    MCH 31.7 26.6 - 33.0 pg    MCHC 33.6 31.5 - 35.7 g/dL    RDW 12.4 12.3 - 15.4 %    RDW-SD 42.5 37.0 - 54.0 fl    MPV 9.5 6.0 - 12.0 fL    Platelets 119 (L) 140 - 450 10*3/mm3    Neutrophil % 71.5 42.7 - 76.0 %    Lymphocyte % 10.2 (L) 19.6 - 45.3 %    Monocyte % 17.7 (H) 5.0 - 12.0 %    Eosinophil % 0.0 (L) 0.3 - 6.2 %    Basophil % 0.3 0.0 - 1.5 %    Immature Grans %  0.3 0.0 - 0.5 %    Neutrophils, Absolute 2.67 1.70 - 7.00 10*3/mm3    Lymphocytes, Absolute 0.38 (L) 0.70 - 3.10 10*3/mm3    Monocytes, Absolute 0.66 0.10 - 0.90 10*3/mm3    Eosinophils, Absolute 0.00 0.00 - 0.40 10*3/mm3    Basophils, Absolute 0.01 0.00 - 0.20 10*3/mm3    Immature Grans, Absolute 0.01 0.00 - 0.05 10*3/mm3    nRBC 0.0 0.0 - 0.2 /100 WBC       Ordered the above labs and independently reviewed the results.        RADIOLOGY  Xr Chest 1 View    Result Date: 1/2/2021  XR CHEST 1 VW-  Clinical: Fever  COMPARISON 12/31/2020  FINDINGS: There is vague infiltrate within the left mid lung zone slightly more pronounced compared to the previous examination. The right lung is clear. No edema or effusion. The cardiomediastinal silhouette is satisfactory in appearance. Biapical pleural thickening and scarring similar to the previous examination.  CONCLUSION: Persistent left lung infiltrate, more pronounced compared to 12/31/2020.  This report was finalized on 1/2/2021 3:41 PM by Dr. Tim Thayer M.D.        I ordered the above noted radiological studies. Reviewed by me and discussed with radiologist.  See dictation for official radiology interpretation.      PROCEDURES    Procedures      MEDICATIONS GIVEN IN ER    Medications   acetaminophen (TYLENOL) tablet 1,000 mg (1,000 mg Oral Given 1/2/21 1507)   ondansetron ODT (ZOFRAN-ODT) disintegrating tablet 4 mg (4 mg Oral Given 1/2/21 1508)         PROGRESS, DATA ANALYSIS, CONSULTS, AND MEDICAL DECISION MAKING    All labs have been independently reviewed by me.  All radiology studies have been reviewed by me and discussed with radiologist dictating the report.   EKG's independently viewed and interpreted by me.  Discussion below represents my analysis of pertinent findings related to patient's condition, differential diagnosis, treatment plan and final disposition.        ED Course as of Jan 02 1855   Sat Jan 02, 2021   6326 Reviewed work-up and findings with the  patient.  He has a worsening pneumonia on chest x-ray.  He is Covid positive.  His fever has resolved with Tylenol here.  Given his worsening pneumonia, fever, persistent nausea plan admission for further monitoring.  His pneumonia is likely related to Covid as his blood cultures had no growth from his recent admission.  His oxygen saturations have also progressively declined while here.    [TR]   1750 Speaking with Dr. Rocha, agrees to admit for observation.    [TR]      ED Course User Index  [TR] Lauri Sarkar MD           PPE: Both the patient and I wore a surgical mask throughout the entire patient encounter. I wore protective goggles.     AS OF 18:55 EST VITALS:    BP - 145/67  HR - 85  TEMP - 99.8 °F (37.7 °C) (Oral)  O2 SATS - 95%        DIAGNOSIS  Final diagnoses:   Fever, unspecified fever cause   COVID-19   Hypoxia   Herpes zoster without complication         DISPOSITION  Admitted to [facility]           Lauri Sarkar MD  01/02/21 [id number]       Lauri Sarkar MD  01/02/21 0910       Lauri Sarkar MD  01/02/21 8781

## 2021-01-02 NOTE — OUTREACH NOTE
COVID-19 Week 1 Survey      Responses   [facility] patient discharged from?  [facility]   Does the patient have one of the following disease processes/diagnoses(primary or secondary)?  COVID-19   COVID-19 underlying condition?  None   Call Number  Call 1   Week 1 Call successful?  Yes   Call start time  0949   Call end time  1000   Discharge diagnosis  **COVID-19 virus detected Pneumonia due to COVID-19 virus    Is patient permission given to speak with other caregiver?  Yes   Person spoke with today (if not patient) and relationship  Pt daughter, Nitza Sanabria reviewed with patient/caregiver?  Yes   Is the patient having any side effects they believe may be caused by any medication additions or changes?  No   Does the patient have all medications ordered at discharge?  No   What is keeping the patient from filling the prescriptions?  Lost script/didn't receive   Prescription comments  pharmacy closed at discharge   Is the patient taking all medications as directed (includes completed medication regime)?  No   What is preventing the patient from taking all medications as directed?  Other   Nursing Interventions  Nurse provided patient education   Does the patient have a primary care provider?   Yes   Does the patient have an appointment with their PCP or specialist within 7 days of discharge?  No   What is preventing the patient from scheduling follow up appointments within 7 days of discharge?  Haven't had time   Nursing Interventions  Educated patient on importance of making appointment   Has the patient kept scheduled appointments due by today?  Yes   Has home health visited the patient within 72 hours of discharge?  N/A   Psychosocial issues?  No   Did the patient receive a copy of their discharge instructions?  Yes   Did the patient receive a copy of COVID-19 specific instructions?  Yes   Nursing interventions  Reviewed instructions with patient   What is the patient's perception of their health status  since discharge?  Worsening   Does the patient have any of the following symptoms?  Fever/chills, Cough, Shortness of breath   Nursing Interventions  Nurse provided patient education   Pulse Ox monitoring  None   If the patient is a current smoker, are they able to teach back resources for cessation?  Not a smoker   Is the patient/caregiver able to teach back the hierarchy of who to call/visit for symptoms/problems? PCP, Specialist, Home health nurse, Urgent Care, ED, 911  Yes   COVID-19 call completed?  Yes   Wrap up additional comments  Patient daughter reports pt is having shivering and cold chills, and running a fever or 102.1 degrees. Reports pt has cough with intermittent SOA. Advised pt needs to return to ER for evaluation.           Danny Baez RN

## 2021-01-03 ENCOUNTER — READMISSION MANAGEMENT (OUTPATIENT)
Dept: CALL CENTER | Facility: HOSPITAL | Age: 86
End: 2021-01-03

## 2021-01-03 PROBLEM — D70.9 NEUTROPENIA (HCC): Status: ACTIVE | Noted: 2021-01-03

## 2021-01-03 LAB
ALBUMIN SERPL-MCNC: 3.2 G/DL (ref 3.5–5.2)
ALBUMIN SERPL-MCNC: 3.3 G/DL (ref 3.5–5.2)
ALBUMIN/GLOB SERPL: 1.1 G/DL
ALP SERPL-CCNC: 55 U/L (ref 39–117)
ALP SERPL-CCNC: 59 U/L (ref 39–117)
ALT SERPL W P-5'-P-CCNC: 26 U/L (ref 1–41)
ALT SERPL W P-5'-P-CCNC: 27 U/L (ref 1–41)
ANION GAP SERPL CALCULATED.3IONS-SCNC: 13.3 MMOL/L (ref 5–15)
AST SERPL-CCNC: 48 U/L (ref 1–40)
AST SERPL-CCNC: 50 U/L (ref 1–40)
BASOPHILS # BLD AUTO: 0.01 10*3/MM3 (ref 0–0.2)
BASOPHILS NFR BLD AUTO: 0.5 % (ref 0–1.5)
BILIRUB CONJ SERPL-MCNC: 0.2 MG/DL (ref 0–0.3)
BILIRUB CONJ SERPL-MCNC: 0.2 MG/DL (ref 0–0.3)
BILIRUB INDIRECT SERPL-MCNC: 0.3 MG/DL
BILIRUB SERPL-MCNC: 0.4 MG/DL (ref 0–1.2)
BILIRUB SERPL-MCNC: 0.5 MG/DL (ref 0–1.2)
BUN SERPL-MCNC: 13 MG/DL (ref 8–23)
BUN/CREAT SERPL: 15.9 (ref 7–25)
CALCIUM SPEC-SCNC: 8.8 MG/DL (ref 8.6–10.5)
CHLORIDE SERPL-SCNC: 104 MMOL/L (ref 98–107)
CK SERPL-CCNC: 289 U/L (ref 20–200)
CO2 SERPL-SCNC: 20.7 MMOL/L (ref 22–29)
CREAT SERPL-MCNC: 0.72 MG/DL (ref 0.76–1.27)
CREAT SERPL-MCNC: 0.82 MG/DL (ref 0.76–1.27)
CRP SERPL-MCNC: 13.44 MG/DL (ref 0–0.5)
D DIMER PPP FEU-MCNC: 1.05 MCGFEU/ML (ref 0–0.49)
D-LACTATE SERPL-SCNC: 1.4 MMOL/L (ref 0.5–2)
DEPRECATED RDW RBC AUTO: 43.1 FL (ref 37–54)
EOSINOPHIL # BLD AUTO: 0 10*3/MM3 (ref 0–0.4)
EOSINOPHIL NFR BLD AUTO: 0 % (ref 0.3–6.2)
ERYTHROCYTE [DISTWIDTH] IN BLOOD BY AUTOMATED COUNT: 12.5 % (ref 12.3–15.4)
FERRITIN SERPL-MCNC: 2757 NG/ML (ref 30–400)
FIBRINOGEN PPP-MCNC: 661 MG/DL (ref 219–464)
GFR SERPL CREATININE-BSD FRML MDRD: 104 ML/MIN/1.73
GFR SERPL CREATININE-BSD FRML MDRD: 90 ML/MIN/1.73
GLOBULIN UR ELPH-MCNC: 2.9 GM/DL
GLUCOSE SERPL-MCNC: 87 MG/DL (ref 65–99)
HCT VFR BLD AUTO: 36.6 % (ref 37.5–51)
HGB BLD-MCNC: 12.4 G/DL (ref 13–17.7)
IMM GRANULOCYTES # BLD AUTO: 0.01 10*3/MM3 (ref 0–0.05)
IMM GRANULOCYTES NFR BLD AUTO: 0.5 % (ref 0–0.5)
LDH SERPL-CCNC: 316 U/L (ref 135–225)
LYMPHOCYTES # BLD AUTO: 0.4 10*3/MM3 (ref 0.7–3.1)
LYMPHOCYTES NFR BLD AUTO: 20 % (ref 19.6–45.3)
MCH RBC QN AUTO: 31.7 PG (ref 26.6–33)
MCHC RBC AUTO-ENTMCNC: 33.9 G/DL (ref 31.5–35.7)
MCV RBC AUTO: 93.6 FL (ref 79–97)
MONOCYTES # BLD AUTO: 0.17 10*3/MM3 (ref 0.1–0.9)
MONOCYTES NFR BLD AUTO: 8.5 % (ref 5–12)
NEUTROPHILS NFR BLD AUTO: 1.41 10*3/MM3 (ref 1.7–7)
NEUTROPHILS NFR BLD AUTO: 70.5 % (ref 42.7–76)
NRBC BLD AUTO-RTO: 0 /100 WBC (ref 0–0.2)
PLATELET # BLD AUTO: 125 10*3/MM3 (ref 140–450)
PMV BLD AUTO: 10.2 FL (ref 6–12)
POTASSIUM SERPL-SCNC: 3.8 MMOL/L (ref 3.5–5.2)
PROT SERPL-MCNC: 6.2 G/DL (ref 6–8.5)
PROT SERPL-MCNC: 6.2 G/DL (ref 6–8.5)
RBC # BLD AUTO: 3.91 10*6/MM3 (ref 4.14–5.8)
SODIUM SERPL-SCNC: 138 MMOL/L (ref 136–145)
TROPONIN T SERPL-MCNC: 0.02 NG/ML (ref 0–0.03)
TROPONIN T SERPL-MCNC: 0.02 NG/ML (ref 0–0.03)
WBC # BLD AUTO: 2 10*3/MM3 (ref 3.4–10.8)

## 2021-01-03 PROCEDURE — 85025 COMPLETE CBC W/AUTO DIFF WBC: CPT | Performed by: HOSPITALIST

## 2021-01-03 PROCEDURE — 80053 COMPREHEN METABOLIC PANEL: CPT | Performed by: HOSPITALIST

## 2021-01-03 PROCEDURE — 86140 C-REACTIVE PROTEIN: CPT | Performed by: HOSPITALIST

## 2021-01-03 PROCEDURE — 85379 FIBRIN DEGRADATION QUANT: CPT | Performed by: HOSPITALIST

## 2021-01-03 PROCEDURE — 85384 FIBRINOGEN ACTIVITY: CPT | Performed by: HOSPITALIST

## 2021-01-03 PROCEDURE — 82550 ASSAY OF CK (CPK): CPT | Performed by: HOSPITALIST

## 2021-01-03 PROCEDURE — 82248 BILIRUBIN DIRECT: CPT | Performed by: HOSPITALIST

## 2021-01-03 PROCEDURE — 25010000002 ENOXAPARIN PER 10 MG: Performed by: HOSPITALIST

## 2021-01-03 PROCEDURE — 83605 ASSAY OF LACTIC ACID: CPT | Performed by: HOSPITALIST

## 2021-01-03 PROCEDURE — 84484 ASSAY OF TROPONIN QUANT: CPT | Performed by: HOSPITALIST

## 2021-01-03 PROCEDURE — 82728 ASSAY OF FERRITIN: CPT | Performed by: HOSPITALIST

## 2021-01-03 PROCEDURE — 63710000001 DEXAMETHASONE PER 0.25 MG: Performed by: HOSPITALIST

## 2021-01-03 PROCEDURE — 83615 LACTATE (LD) (LDH) ENZYME: CPT | Performed by: HOSPITALIST

## 2021-01-03 RX ADMIN — REMDESIVIR 200 MG: 100 INJECTION, POWDER, LYOPHILIZED, FOR SOLUTION INTRAVENOUS at 00:23

## 2021-01-03 RX ADMIN — Medication 3 MG: at 00:23

## 2021-01-03 RX ADMIN — DEXAMETHASONE 6 MG: 4 TABLET ORAL at 00:23

## 2021-01-03 RX ADMIN — ENOXAPARIN SODIUM 40 MG: 40 INJECTION SUBCUTANEOUS at 21:01

## 2021-01-03 RX ADMIN — Medication 1 TABLET: at 08:05

## 2021-01-03 RX ADMIN — REMDESIVIR 100 MG: 100 INJECTION, POWDER, LYOPHILIZED, FOR SOLUTION INTRAVENOUS at 21:01

## 2021-01-03 RX ADMIN — Medication 3 MG: at 21:01

## 2021-01-03 RX ADMIN — ACETAMINOPHEN 650 MG: 325 TABLET, FILM COATED ORAL at 00:23

## 2021-01-03 RX ADMIN — LEVETIRACETAM 500 MG: 500 TABLET, FILM COATED ORAL at 08:05

## 2021-01-03 RX ADMIN — LEVETIRACETAM 500 MG: 500 TABLET, FILM COATED ORAL at 21:01

## 2021-01-03 RX ADMIN — ENOXAPARIN SODIUM 40 MG: 40 INJECTION SUBCUTANEOUS at 00:24

## 2021-01-03 RX ADMIN — TAMSULOSIN HYDROCHLORIDE 0.4 MG: 0.4 CAPSULE ORAL at 00:23

## 2021-01-03 RX ADMIN — TAMSULOSIN HYDROCHLORIDE 0.4 MG: 0.4 CAPSULE ORAL at 21:01

## 2021-01-03 RX ADMIN — LEVETIRACETAM 500 MG: 500 TABLET, FILM COATED ORAL at 00:23

## 2021-01-03 RX ADMIN — MUPIROCIN 1 APPLICATION: 20 OINTMENT TOPICAL at 00:23

## 2021-01-03 RX ADMIN — DEXAMETHASONE 6 MG: 4 TABLET ORAL at 08:05

## 2021-01-03 RX ADMIN — FAMOTIDINE 20 MG: 20 TABLET, FILM COATED ORAL at 08:05

## 2021-01-03 RX ADMIN — MUPIROCIN 1 APPLICATION: 20 OINTMENT TOPICAL at 17:41

## 2021-01-03 NOTE — PLAN OF CARE
Goal Outcome Evaluation:  Plan of Care Reviewed With: patient  Progress: no change  Outcome Summary: new admit from ER, pt is alert and oriented, forgetful, room air, no falls, bed alarm on, remdesivir and decadron ordered, wound consulted, continue to monitor

## 2021-01-03 NOTE — PROGRESS NOTES
"Pharmacy Consult - Lovenox    Juan C Ca has been consulted for pharmacy to dose enoxaparin for VTE prophylaxis (COVID-19 positive) per Dr. Rocha's request.       Relevant clinical data and objective history reviewed:  85 y.o. male 172.7 cm (68\") 68.1 kg (150 lb 3.2 oz)    Home Anticoagulation:     Past Medical History:   Diagnosis Date   • Cancer (CMS/HCC)     skin cancer   • CVD (cerebrovascular disease)     with remote infarcts per CT of head.   • H/O Iron deficiency anemia    • H/O Traumatic brain injury (CMS/HCC)    • H/O: pneumonia 04/2015   • Hyperlipidemia    • Hypertension    • Rheumatoid arthritis (CMS/HCC)    • Seizures (CMS/HCC)     Started in 1960s.     is allergic to sulfadiazine.    Lab Results   Component Value Date    WBC 3.73 01/02/2021    HGB 11.4 (L) 01/02/2021    HCT 33.9 (L) 01/02/2021    MCV 94.2 01/02/2021     (L) 01/02/2021     Lab Results   Component Value Date    GLUCOSE 80 01/02/2021    CALCIUM 8.6 01/02/2021     (L) 01/02/2021    K 4.0 01/02/2021    CO2 23.5 01/02/2021     01/02/2021    BUN 11 01/02/2021    CREATININE 0.90 01/02/2021    EGFRIFNONA 81 01/02/2021    BCR 12.2 01/02/2021    ANIONGAP 9.5 01/02/2021       Estimated Creatinine Clearance: 59.9 mL/min (by C-G formula based on SCr of 0.9 mg/dL).    Active Inpatient Anticoagulation Orders:     Assessment/Plan    Will start patient on Lovenox 40 mg subcutaneous every 24 hours, adjusted for renal function. D-dimer 0.79. Labs to be ordered: CBC/CMP and repeat D-Dimer in AM. Pharmacy will continue to follow.     Ced Coats, [facility]     "

## 2021-01-03 NOTE — PROGRESS NOTES
Patient Name:  Juan C Ca  YOB: 1935  MRN:  [id number]  Admit Date:  1/2/2021  Patient Care Team:  Tej Cedillo Jr., MD as PCP - General  Tej Cedillo Jr., MD as PCP - Family Medicine  Tej Cedillo Jr., MD as Referring Physician (Internal Medicine)  Rasheeda Novoa MD as Consulting Physician (Hematology and Oncology)      Subjective   History Present Illness     Chief Complaint   Patient presents with   • Fever       85-year-old gentleman discharged from here yesterday after overnight stay for Covid 19 diagnosis.  He did not qualify for dexamethasone or remdesivir and had normal oxygenation during his hospital stay.  He states he was called by the hospital today for follow-up and due to reported fevers was told to come back.  He continues to feel bad primarily due to the fever.  He states his shortness of breath and cough are unchanged.  His symptoms have now been present for about 4 days.  He denies chest pain.  His wife is now in the ED with similar symptoms.  He was having nausea and vomiting but this has subsided.  Mild diarrhea persists.    History of Present Illness  Review of Systems   Constitutional: Positive for chills, fatigue and fever.   HENT: Negative.    Eyes: Negative.    Respiratory: Positive for shortness of breath.    Gastrointestinal: Positive for diarrhea. Negative for vomiting.   Endocrine: Negative.    Genitourinary: Negative.    Musculoskeletal: Positive for myalgias.   Skin: Negative.    Neurological: Positive for weakness.   Hematological: Negative.    Psychiatric/Behavioral: Negative.  Negative for confusion.        Personal History     Past Medical History:   Diagnosis Date   • Cancer (CMS/HCC)     skin cancer   • CVD (cerebrovascular disease)     with remote infarcts per CT of head.   • H/O Iron deficiency anemia    • H/O Traumatic brain injury (CMS/HCC)    • H/O: pneumonia 04/2015   • Hyperlipidemia    • Hypertension    • Rheumatoid  arthritis (CMS/HCC)    • Seizures (CMS/HCC)     Started in 1960s.     Past Surgical History:   Procedure Laterality Date   • APPENDECTOMY  1950   • CATARACT EXTRACTION     • COLONOSCOPY N/A 2017    Procedure: COLONOSCOPY TO CECUM AND TI WITH APC CAUTERY OF RIGHT COLON AVM ;  Surgeon: Lucio Stein MD;  Location: [facility] ENDOSCOPY;  Service:    • ENDOSCOPY N/A 2017    Procedure: ESOPHAGOGASTRODUODENOSCOPY with BX ;  Surgeon: Lucio Stein MD;  Location: [facility] ENDOSCOPY;  Service:      Family History   Problem Relation Age of Onset   • Colon cancer Other    • Hypertension Mother    • Mental illness Mother    • Hypertension Father    • Heart disease Sister    • Hypertension Sister    • Colon cancer Sister    • Skin cancer Sister    • Heart disease Brother    • Hypertension Brother    • Lung cancer Brother    • Diabetes Daughter    • Throat cancer Brother    • Cancer Brother         Bladder   • Prostate cancer Brother      Social History     Tobacco Use   • Smoking status: Former Smoker     Years: 15.00     Types: Cigarettes     Quit date: 1987     Years since quittin.0   • Smokeless tobacco: Never Used   Substance Use Topics   • Alcohol use: No   • Drug use: No     No current facility-administered medications on file prior to encounter.      Current Outpatient Medications on File Prior to Encounter   Medication Sig Dispense Refill   • acetaminophen (TYLENOL) 500 MG tablet Take 2 tablets by mouth Every 6 (Six) Hours As Needed for Mild Pain  or Fever.     • cetirizine (zyrTEC) 10 MG tablet Take 0.5 tablets by mouth Daily.     • docusate sodium 100 MG capsule Take 100 mg by mouth 2 (Two) Times a Day.     • guaiFENesin (ROBITUSSIN) 100 MG/5ML syrup Take 200 mg by mouth At Night As Needed for Cough.     • Homeopathic Products (ZICAM COLD REMEDY PO) Take 1 capsule by mouth Daily.     • hydroxychloroquine (PLAQUENIL) 200 MG tablet Take 200 mg by mouth 2 (Two) Times a Day.     • leflunomide (ARAVA)  20 MG tablet Daily.     • levETIRAcetam (KEPPRA) 500 MG tablet Take 500 mg by mouth 2 (Two) Times a Day.     • loperamide (IMODIUM) 2 MG capsule Take 1 capsule by mouth 2 (Two) Times a Day As Needed for Diarrhea. OTC     • Multiple Vitamin (MULTI VITAMIN MENS PO) Take 1 tablet by mouth Daily.     • ondansetron (Zofran) 4 MG tablet Take 1 tablet by mouth Every 8 (Eight) Hours As Needed for Nausea or Vomiting. 15 tablet 0   • tamsulosin (FLOMAX) 0.4 MG capsule 24 hr capsule Take 1 capsule by mouth every night. 30 capsule 0   • vitamin D (ERGOCALCIFEROL) 1.25 MG (79894 UT) capsule capsule Take 50,000 Units by mouth Every 14 (Fourteen) Days.     • [DISCONTINUED] Multiple Vitamins-Minerals (Systane ICaps AREDS2) capsule Take  by mouth.     • [DISCONTINUED] mupirocin (BACTROBAN) 2 % ointment        Allergies   Allergen Reactions   • Sulfadiazine Unknown - High Severity     fever  Other reaction(s): Fever       Objective    Objective     Vital Signs  Temp:  [99.2 °F (37.3 °C)-102.4 °F (39.1 °C)] 99.2 °F (37.3 °C)  Heart Rate:  [79-89] 79  Resp:  [16-20] 20  BP: (145-168)/(67-83) 165/80  SpO2:  [92 %-96 %] 92 %  on   ;   Device (Oxygen Therapy): room air  Body mass index is 22.84 kg/m².    Physical Exam  Vitals signs reviewed.   Constitutional:       Appearance: He is well-developed. He is ill-appearing. He is not toxic-appearing.   HENT:      Head: Normocephalic and atraumatic.   Eyes:      General: No scleral icterus.     Conjunctiva/sclera: Conjunctivae normal.   Neck:      Musculoskeletal: Normal range of motion and neck supple.      Vascular: No JVD.   Cardiovascular:      Rate and Rhythm: Normal rate and regular rhythm.      Heart sounds: No murmur.   Pulmonary:      Effort: Pulmonary effort is normal. No respiratory distress.      Breath sounds: Normal breath sounds.   Abdominal:      General: Bowel sounds are normal. There is no distension.      Palpations: Abdomen is soft.      Tenderness: There is no abdominal  tenderness.   Skin:     General: Skin is warm and dry.   Neurological:      Mental Status: He is alert and oriented to person, place, and time.      Cranial Nerves: No cranial nerve deficit.   Psychiatric:         Behavior: Behavior normal.         Results Review:  I reviewed the patient's new clinical results.  I reviewed the patient's new imaging results and agree with the interpretation.  I reviewed the patient's other test results and agree with the interpretation  I personally viewed and interpreted the patient's EKG/Telemetry data  Discussed with ED provider.    Lab Results (last 24 hours)     Procedure Component Value Units Date/Time    CBC & Differential [[id number]]  (Abnormal) Collected: 01/02/21 1510    Specimen: Blood Updated: 01/02/21 1518    Narrative:      The following orders were created for panel order CBC & Differential.  Procedure                               Abnormality         Status                     ---------                               -----------         ------                     CBC Auto Differential[[id number]]        Abnormal            Final result                 Please view results for these tests on the individual orders.    Comprehensive Metabolic Panel [[id number]]  (Abnormal) Collected: 01/02/21 1510    Specimen: Blood Updated: 01/02/21 1553     Glucose 80 mg/dL      BUN 11 mg/dL      Creatinine 0.90 mg/dL      Sodium 134 mmol/L      Potassium 4.0 mmol/L      Comment: Slight hemolysis detected by analyzer. Results may be affected.        Chloride 101 mmol/L      CO2 23.5 mmol/L      Calcium 8.6 mg/dL      Total Protein 6.7 g/dL      Albumin 3.60 g/dL      ALT (SGPT) 28 U/L      AST (SGOT) 51 U/L      Comment: Slight hemolysis detected by analyzer. Results may be affected.        Alkaline Phosphatase 64 U/L      Total Bilirubin 0.6 mg/dL      eGFR Non African Amer 81 mL/min/1.73      Globulin 3.1 gm/dL      A/G Ratio 1.2 g/dL      BUN/Creatinine Ratio 12.2     Anion Gap 9.5  mmol/L     Narrative:      GFR Normal >60  Chronic Kidney Disease <60  Kidney Failure <15      Lactic Acid, Plasma [[id number]]  (Normal) Collected: 01/02/21 1510    Specimen: Blood Updated: 01/02/21 1542     Lactate 1.0 mmol/L     CBC Auto Differential [[id number]]  (Abnormal) Collected: 01/02/21 1510    Specimen: Blood Updated: 01/02/21 1518     WBC 3.73 10*3/mm3      RBC 3.60 10*6/mm3      Hemoglobin 11.4 g/dL      Hematocrit 33.9 %      MCV 94.2 fL      MCH 31.7 pg      MCHC 33.6 g/dL      RDW 12.4 %      RDW-SD 42.5 fl      MPV 9.5 fL      Platelets 119 10*3/mm3      Neutrophil % 71.5 %      Lymphocyte % 10.2 %      Monocyte % 17.7 %      Eosinophil % 0.0 %      Basophil % 0.3 %      Immature Grans % 0.3 %      Neutrophils, Absolute 2.67 10*3/mm3      Lymphocytes, Absolute 0.38 10*3/mm3      Monocytes, Absolute 0.66 10*3/mm3      Eosinophils, Absolute 0.00 10*3/mm3      Basophils, Absolute 0.01 10*3/mm3      Immature Grans, Absolute 0.01 10*3/mm3      nRBC 0.0 /100 WBC     Blood Culture - Blood, Arm, Right [[id number]] Collected: 01/02/21 1525    Specimen: Blood from Arm, Right Updated: 01/02/21 1529    Blood Culture - Blood, Arm, Left [[id number]] Collected: 01/02/21 1525    Specimen: Blood from Arm, Left Updated: 01/02/21 1529          Imaging Results (Last 24 Hours)     Procedure Component Value Units Date/Time    XR Chest 1 View [[id number]] Collected: 01/02/21 1540     Updated: 01/02/21 1545    Narrative:      XR CHEST 1 VW-     Clinical: Fever     COMPARISON 12/31/2020     FINDINGS: There is vague infiltrate within the left mid lung zone  slightly more pronounced compared to the previous examination. The right  lung is clear. No edema or effusion. The cardiomediastinal silhouette is  satisfactory in appearance. Biapical pleural thickening and scarring  similar to the previous examination.     CONCLUSION: Persistent left lung infiltrate, more pronounced compared to  12/31/2020.     This report was finalized on  1/2/2021 3:41 PM by Dr. Tim Thayer M.D.                No orders to display        Assessment/Plan     Active Hospital Problems    Diagnosis  POA   • **Pneumonia due to COVID-19 virus [U07.1, J12.82]  Yes   • TBI (traumatic brain injury) (CMS/[facility]) [S06.9X9A]  Yes   • Hypoxia [R09.02]  Yes   • Hypertension [I10]  Yes   • Rheumatoid arthritis (CMS/[facility]) [M06.9]  Yes   • Hyperlipidemia [E78.5]  Yes      Resolved Hospital Problems   No resolved problems to display.       85 y.o. male admitted with Pneumonia due to COVID-19 virus.    Patieny's chest x-ray mildly worse compared to prior.  According to the ED physician his oxygen saturations were 93% however he never dropped below 96% on room air during my exam.  No other documented hypoxia.  Will repeat inflammatory markers.  Continued fever not unexpected.      · Pharmacy to dose Lovenox for DVT prophylaxis.  · Full code.  · Discussed with patient and ED provider.      Jagdish Rocha MD  [facility]  01/02/21  21:50 EST

## 2021-01-03 NOTE — H&P
Patient Name:  Juan C Ca  YOB: 1935  MRN:  [id number]  Admit Date:  1/2/2021  Patient Care Team:  Tej Cedillo Jr., MD as PCP - General  Tej Cedillo Jr., MD as PCP - Family Medicine  Tej Cedillo Jr., MD as Referring Physician (Internal Medicine)  Rasheeda Novoa MD as Consulting Physician (Hematology and Oncology)      Subjective   History Present Illness     Chief Complaint   Patient presents with   • Fever       85-year-old gentleman discharged from here yesterday after overnight stay for Covid 19 diagnosis.  He did not qualify for dexamethasone or remdesivir and had normal oxygenation during his hospital stay.  He states he was called by the hospital today for follow-up and due to reported fevers was told to come back.  He continues to feel bad primarily due to the fever.  He states his shortness of breath and cough are unchanged.  His symptoms have now been present for about 4 days.  He denies chest pain.  His wife is now in the ED with similar symptoms.  He was having nausea and vomiting but this has subsided.  Mild diarrhea persists.    History of Present Illness  Review of Systems   Constitutional: Positive for chills, fatigue and fever.   HENT: Negative.    Eyes: Negative.    Respiratory: Positive for shortness of breath.    Gastrointestinal: Positive for diarrhea. Negative for vomiting.   Endocrine: Negative.    Genitourinary: Negative.    Musculoskeletal: Positive for myalgias.   Skin: Negative.    Neurological: Positive for weakness.   Hematological: Negative.    Psychiatric/Behavioral: Negative.  Negative for confusion.        Personal History     Past Medical History:   Diagnosis Date   • Cancer (CMS/HCC)     skin cancer   • CVD (cerebrovascular disease)     with remote infarcts per CT of head.   • H/O Iron deficiency anemia    • H/O Traumatic brain injury (CMS/HCC)    • H/O: pneumonia 04/2015   • Hyperlipidemia    • Hypertension    • Rheumatoid  arthritis (CMS/HCC)    • Seizures (CMS/HCC)     Started in 1960s.     Past Surgical History:   Procedure Laterality Date   • APPENDECTOMY  1950   • CATARACT EXTRACTION     • COLONOSCOPY N/A 2017    Procedure: COLONOSCOPY TO CECUM AND TI WITH APC CAUTERY OF RIGHT COLON AVM ;  Surgeon: Lucio Stein MD;  Location: [facility] ENDOSCOPY;  Service:    • ENDOSCOPY N/A 2017    Procedure: ESOPHAGOGASTRODUODENOSCOPY with BX ;  Surgeon: Lucio Stein MD;  Location: [facility] ENDOSCOPY;  Service:      Family History   Problem Relation Age of Onset   • Colon cancer Other    • Hypertension Mother    • Mental illness Mother    • Hypertension Father    • Heart disease Sister    • Hypertension Sister    • Colon cancer Sister    • Skin cancer Sister    • Heart disease Brother    • Hypertension Brother    • Lung cancer Brother    • Diabetes Daughter    • Throat cancer Brother    • Cancer Brother         Bladder   • Prostate cancer Brother      Social History     Tobacco Use   • Smoking status: Former Smoker     Years: 15.00     Types: Cigarettes     Quit date: 1987     Years since quittin.0   • Smokeless tobacco: Never Used   Substance Use Topics   • Alcohol use: No   • Drug use: No     No current facility-administered medications on file prior to encounter.      Current Outpatient Medications on File Prior to Encounter   Medication Sig Dispense Refill   • acetaminophen (TYLENOL) 500 MG tablet Take 2 tablets by mouth Every 6 (Six) Hours As Needed for Mild Pain  or Fever.     • cetirizine (zyrTEC) 10 MG tablet Take 0.5 tablets by mouth Daily.     • docusate sodium 100 MG capsule Take 100 mg by mouth 2 (Two) Times a Day.     • guaiFENesin (ROBITUSSIN) 100 MG/5ML syrup Take 200 mg by mouth At Night As Needed for Cough.     • Homeopathic Products (ZICAM COLD REMEDY PO) Take 1 capsule by mouth Daily.     • hydroxychloroquine (PLAQUENIL) 200 MG tablet Take 200 mg by mouth 2 (Two) Times a Day.     • leflunomide (ARAVA)  20 MG tablet Daily.     • levETIRAcetam (KEPPRA) 500 MG tablet Take 500 mg by mouth 2 (Two) Times a Day.     • loperamide (IMODIUM) 2 MG capsule Take 1 capsule by mouth 2 (Two) Times a Day As Needed for Diarrhea. OTC     • Multiple Vitamin (MULTI VITAMIN MENS PO) Take 1 tablet by mouth Daily.     • ondansetron (Zofran) 4 MG tablet Take 1 tablet by mouth Every 8 (Eight) Hours As Needed for Nausea or Vomiting. 15 tablet 0   • tamsulosin (FLOMAX) 0.4 MG capsule 24 hr capsule Take 1 capsule by mouth every night. 30 capsule 0   • vitamin D (ERGOCALCIFEROL) 1.25 MG (90150 UT) capsule capsule Take 50,000 Units by mouth Every 14 (Fourteen) Days.     • [DISCONTINUED] Multiple Vitamins-Minerals (Systane ICaps AREDS2) capsule Take  by mouth.     • [DISCONTINUED] mupirocin (BACTROBAN) 2 % ointment        Allergies   Allergen Reactions   • Sulfadiazine Unknown - High Severity     fever  Other reaction(s): Fever       Objective    Objective     Vital Signs  Temp:  [99.2 °F (37.3 °C)-102.4 °F (39.1 °C)] 99.2 °F (37.3 °C)  Heart Rate:  [79-89] 79  Resp:  [16-20] 20  BP: (145-168)/(67-83) 165/80  SpO2:  [92 %-96 %] 92 %  on   ;   Device (Oxygen Therapy): room air  Body mass index is 22.84 kg/m².    Physical Exam  Vitals signs reviewed.   Constitutional:       Appearance: He is well-developed. He is ill-appearing. He is not toxic-appearing.   HENT:      Head: Normocephalic and atraumatic.   Eyes:      General: No scleral icterus.     Conjunctiva/sclera: Conjunctivae normal.   Neck:      Musculoskeletal: Normal range of motion and neck supple.      Vascular: No JVD.   Cardiovascular:      Rate and Rhythm: Normal rate and regular rhythm.      Heart sounds: No murmur.   Pulmonary:      Effort: Pulmonary effort is normal. No respiratory distress.      Breath sounds: Normal breath sounds.   Abdominal:      General: Bowel sounds are normal. There is no distension.      Palpations: Abdomen is soft.      Tenderness: There is no abdominal  tenderness.   Skin:     General: Skin is warm and dry.   Neurological:      Mental Status: He is alert and oriented to person, place, and time.      Cranial Nerves: No cranial nerve deficit.   Psychiatric:         Behavior: Behavior normal.         Results Review:  I reviewed the patient's new clinical results.  I reviewed the patient's new imaging results and agree with the interpretation.  I reviewed the patient's other test results and agree with the interpretation  I personally viewed and interpreted the patient's EKG/Telemetry data  Discussed with ED provider.    Lab Results (last 24 hours)     Procedure Component Value Units Date/Time    CBC & Differential [[id number]]  (Abnormal) Collected: 01/02/21 1510    Specimen: Blood Updated: 01/02/21 1518    Narrative:      The following orders were created for panel order CBC & Differential.  Procedure                               Abnormality         Status                     ---------                               -----------         ------                     CBC Auto Differential[[id number]]        Abnormal            Final result                 Please view results for these tests on the individual orders.    Comprehensive Metabolic Panel [[id number]]  (Abnormal) Collected: 01/02/21 1510    Specimen: Blood Updated: 01/02/21 1553     Glucose 80 mg/dL      BUN 11 mg/dL      Creatinine 0.90 mg/dL      Sodium 134 mmol/L      Potassium 4.0 mmol/L      Comment: Slight hemolysis detected by analyzer. Results may be affected.        Chloride 101 mmol/L      CO2 23.5 mmol/L      Calcium 8.6 mg/dL      Total Protein 6.7 g/dL      Albumin 3.60 g/dL      ALT (SGPT) 28 U/L      AST (SGOT) 51 U/L      Comment: Slight hemolysis detected by analyzer. Results may be affected.        Alkaline Phosphatase 64 U/L      Total Bilirubin 0.6 mg/dL      eGFR Non African Amer 81 mL/min/1.73      Globulin 3.1 gm/dL      A/G Ratio 1.2 g/dL      BUN/Creatinine Ratio 12.2     Anion Gap 9.5  mmol/L     Narrative:      GFR Normal >60  Chronic Kidney Disease <60  Kidney Failure <15      Lactic Acid, Plasma [[id number]]  (Normal) Collected: 01/02/21 1510    Specimen: Blood Updated: 01/02/21 1542     Lactate 1.0 mmol/L     CBC Auto Differential [[id number]]  (Abnormal) Collected: 01/02/21 1510    Specimen: Blood Updated: 01/02/21 1518     WBC 3.73 10*3/mm3      RBC 3.60 10*6/mm3      Hemoglobin 11.4 g/dL      Hematocrit 33.9 %      MCV 94.2 fL      MCH 31.7 pg      MCHC 33.6 g/dL      RDW 12.4 %      RDW-SD 42.5 fl      MPV 9.5 fL      Platelets 119 10*3/mm3      Neutrophil % 71.5 %      Lymphocyte % 10.2 %      Monocyte % 17.7 %      Eosinophil % 0.0 %      Basophil % 0.3 %      Immature Grans % 0.3 %      Neutrophils, Absolute 2.67 10*3/mm3      Lymphocytes, Absolute 0.38 10*3/mm3      Monocytes, Absolute 0.66 10*3/mm3      Eosinophils, Absolute 0.00 10*3/mm3      Basophils, Absolute 0.01 10*3/mm3      Immature Grans, Absolute 0.01 10*3/mm3      nRBC 0.0 /100 WBC     Blood Culture - Blood, Arm, Right [[id number]] Collected: 01/02/21 1525    Specimen: Blood from Arm, Right Updated: 01/02/21 1529    Blood Culture - Blood, Arm, Left [[id number]] Collected: 01/02/21 1525    Specimen: Blood from Arm, Left Updated: 01/02/21 1529          Imaging Results (Last 24 Hours)     Procedure Component Value Units Date/Time    XR Chest 1 View [[id number]] Collected: 01/02/21 1540     Updated: 01/02/21 1545    Narrative:      XR CHEST 1 VW-     Clinical: Fever     COMPARISON 12/31/2020     FINDINGS: There is vague infiltrate within the left mid lung zone  slightly more pronounced compared to the previous examination. The right  lung is clear. No edema or effusion. The cardiomediastinal silhouette is  satisfactory in appearance. Biapical pleural thickening and scarring  similar to the previous examination.     CONCLUSION: Persistent left lung infiltrate, more pronounced compared to  12/31/2020.     This report was finalized on  1/2/2021 3:41 PM by Dr. Tim Thayer M.D.                No orders to display        Assessment/Plan     Active Hospital Problems    Diagnosis  POA   • **Pneumonia due to COVID-19 virus [U07.1, J12.82]  Yes   • TBI (traumatic brain injury) (CMS/[facility]) [S06.9X9A]  Yes   • Hypoxia [R09.02]  Yes   • Hypertension [I10]  Yes   • Rheumatoid arthritis (CMS/[facility]) [M06.9]  Yes   • Hyperlipidemia [E78.5]  Yes      Resolved Hospital Problems   No resolved problems to display.       85 y.o. male admitted with Pneumonia due to COVID-19 virus.    Patieny's chest x-ray mildly worse compared to prior.  According to the ED physician his oxygen saturations were 93% however he never dropped below 96% on room air during my exam.  No other documented hypoxia.  Will repeat inflammatory markers.  Continued fever not unexpected.      · Pharmacy to dose Lovenox for DVT prophylaxis.  · Full code.  · Discussed with patient and ED provider.      Jagdish Rocha MD  [facility]  01/02/21  21:50 EST      ADDENDUM  RN on floor reports sats 90-95% on RA.  Will start Remdesivir and decadron due to his hypoxia.  No e/o respiratory failure presently.      Jagdish Rocha MD  [facility]  01/02/21  22:56 EST

## 2021-01-03 NOTE — PLAN OF CARE
Goal Outcome Evaluation:  Plan of Care Reviewed With: patient  Progress: improving  Outcome Summary: pt vss, pt remains on room air and tolerating well, pt graph sites dressing changes per familys routine with vinager water mix and awaiting wound care orders. Pt up to bathroom throughout shift and tolerated well with bouts of diarrhea noted. No fevers at this time, pt taking good PO intake, daughter extremely involved with care and helpful. No s/s of distress will cont to monitor

## 2021-01-03 NOTE — PROGRESS NOTES
[facility]  Clinical Pharmacy Department     Remdesivir Review Note    Juan C Ca is a 85 y.o. male with confirmed COVID-19 infection on day 1 of hospitalization.     Consulting Provider:  Dr Rocha  Date of Confirmed SARS-CoV-2: 12/31/20  Date of Symptom Onset: 12/30/20  Planned Duration of Therapy: 5 days  Other Antimicrobials: none  Hydroxychloroquine or chloroquine prior to arrival: Yes (Rheumatoid Arthritis)    Allergies  Allergies as of 01/02/2021 - Reviewed 01/02/2021   Allergen Reaction Noted    Sulfadiazine Unknown - High Severity 10/15/2019       Microbiology:  Microbiology Results (last 10 days)       Procedure Component Value - Date/Time    Blood Culture - Blood, Arm, Right [[id number]] Collected: 12/31/20 1033    Lab Status: Preliminary result Specimen: Blood from Arm, Right Updated: 01/02/21 1045     Blood Culture No growth at 2 days    Respiratory Panel PCR w/COVID-19(SARS-CoV-2) CHELLY/MAULIK/IGOR/PAD/COR/MAD/MICHELLE In-House, NP Swab in UTM/VTM, 3-4 HR TAT - Swab, Nasopharynx [[id number]]  (Abnormal) Collected: 12/31/20 1010    Lab Status: Final result Specimen: Swab from Nasopharynx Updated: 12/31/20 1141     ADENOVIRUS, PCR Not Detected     Coronavirus 229E Not Detected     Coronavirus HKU1 Not Detected     Coronavirus NL63 Not Detected     Coronavirus OC43 Not Detected     COVID19 Detected     Human Metapneumovirus Not Detected     Human Rhinovirus/Enterovirus Not Detected     Influenza A PCR Not Detected     Influenza B PCR Not Detected     Parainfluenza Virus 1 Not Detected     Parainfluenza Virus 2 Not Detected     Parainfluenza Virus 3 Not Detected     Parainfluenza Virus 4 Not Detected     RSV, PCR Not Detected     Bordetella pertussis pcr Not Detected     Bordetella parapertussis PCR Not Detected     Chlamydophila pneumoniae PCR Not Detected     Mycoplasma pneumo by PCR Not Detected    Narrative:      Fact sheet for providers:  https://docs.[employer]/wp-content/uploads/TUT7036-0509-KH8.1-EUA-Provider-Fact-Sheet-3.pdf    Fact sheet for patients: https://docs.[employer]/wp-content/uploads/MSI0999-8685-JB5.1-EUA-Patient-Fact-Sheet-1.pdf    Test performed by PCR.    Blood Culture - Blood, Arm, Left [[id number]] Collected: 12/31/20 1008    Lab Status: Preliminary result Specimen: Blood from Arm, Left Updated: 01/02/21 1030     Blood Culture No growth at 2 days            Radiology/Imaging:  Narrative:     XR CHEST 1 VW-       Clinical: Fever       COMPARISON 12/31/2020       FINDINGS: There is vague infiltrate within the left mid lung zone   slightly more pronounced compared to the previous examination. The right   lung is clear. No edema or effusion. The cardiomediastinal silhouette is   satisfactory in appearance. Biapical pleural thickening and scarring   similar to the previous examination.       CONCLUSION: Persistent left lung infiltrate, more pronounced compared to   12/31/2020.       This report was finalized on 1/2/2021 3:41 PM by Dr. Tim Thayer M.D.          Vitals/Labs/I&O  [unfilled]    Results from last 7 days   Lab Units 01/02/21  1510 01/01/21  0833 12/31/20  1008   WBC 10*3/mm3 3.73 5.35 6.49     Results from last 7 days   Lab Units 12/31/20  1509 12/31/20  1008   PROCALCITONIN ng/mL 0.24 0.09     Results from last 7 days   Lab Units 01/02/21  1510 01/01/21  0833 12/31/20  1008   AST (SGOT) U/L 51* 34 38      Results from last 7 days   Lab Units 01/02/21  1510 01/01/21  0833 12/31/20  1008   ALT (SGPT) U/L 28 22 22       Estimated Creatinine Clearance: 59.9 mL/min (by C-G formula based on SCr of 0.9 mg/dL).  Results from last 7 days   Lab Units 01/02/21  1510 01/01/21  0833 12/31/20  1008   BUN mg/dL 11 11 15   CREATININE mg/dL 0.90 0.71* 0.88     Intake & Output (last 3 days)         12/31 0701 - 01/01 0700 01/01 0701 - 01/02 0700 01/02 0701 - 01/03 0700    P.O.   0    Total Intake(mL/kg)   0 (0)    Net   0            Urine Unmeasured Occurrence   1 x    Stool Unmeasured Occurrence   1 x            Assessment/Plan:    Patient is hospitalized with confirmed, severe COVID-19 infection and started on remdesivir 200 mg IV once followed by 100 mg IV daily for 4 days (5 day total duration). All inclusions, exclusions, and monitoring requirements listed below have been reviewed.    Patient is hospitalized with confirmed COVID-19 infection  Patient is requiring supplemental oxygen to maintain oxygen saturations of ? 94%   Baseline and daily LFTs and Scr have been ordered prior to remdesivir initiation  ALT is not ? 10 times the upper limit of normal  Patient is not on concomitant hydroxychloroquine or chloroquine       Thank you for involving pharmacy in this patient's care. Please contact pharmacy with any questions or concerns.                           [facility], located within [facility]  Clinical Pharmacist  01/02/21 23:17 EST

## 2021-01-03 NOTE — PROGRESS NOTES
"   LOS: 1 day   Patient Care Team:  Tej Cedillo Jr., MD as PCP - General  Tej Cedillo Jr., MD as PCP - Family Medicine  Tej Cedillo Jr., MD as Referring Physician (Internal Medicine)  Rasheeda Novoa MD as Consulting Physician (Hematology and Oncology)    Chief Complaint: SOA    Subjective     Feeling okay today. Mild improvement since admission last night. Still gets SOA at times. No chest pain or palp. No N/V. No D. Tolerating diet. Voiding well.       Subjective:  Symptoms:  Stable.  He reports shortness of breath, malaise, cough and weakness.  No chest pain, headache, chest pressure, anorexia, diarrhea or anxiety.    Diet:  Adequate intake.  No nausea or vomiting.    Activity level: Impaired due to weakness.    Pain:  He reports no pain.        History taken from: patient chart RN    Objective     Vital Signs  Temp:  [97.8 °F (36.6 °C)-102.4 °F (39.1 °C)] 97.8 °F (36.6 °C)  Heart Rate:  [58-89] 67  Resp:  [16-20] 20  BP: (145-168)/(67-83) 149/79    Objective:  General Appearance:  Comfortable and in no acute distress.    Vital signs: (most recent): Blood pressure 149/79, pulse 67, temperature 97.8 °F (36.6 °C), temperature source Oral, resp. rate 20, height 172.7 cm (68\"), weight 68.1 kg (150 lb 3.2 oz), SpO2 98 %.  Vital signs are normal.  No fever.  (O2 sats drop to 85% on RA intermittently).    Output: Producing urine and no stool output.    HEENT: Normal HEENT exam.    Lungs:  Normal effort and normal respiratory rate.  Breath sounds clear to auscultation.  He is not in respiratory distress.    Heart: Normal rate.  Regular rhythm.  No murmur.   Abdomen: Abdomen is soft.  Bowel sounds are normal.   There is no abdominal tenderness.     Extremities: There is no dependent edema.    Pulses: Distal pulses are intact.    Neurological: Patient is alert and oriented to person, place and time.  Normal strength.  Patient has normal muscle tone.    Pupils:  Pupils are equal.   Skin:  Warm and " dry.  No rash.             Results Review:     I reviewed the patient's new clinical results.  I reviewed the patient's other test results and agree with the interpretation  I personally viewed and interpreted the patient's EKG/Telemetry data  Discussed with pt and RN    Results from last 7 days   Lab Units 01/03/21  0612 01/02/21  1510 01/01/21  0833 12/31/20  1008   WBC 10*3/mm3 2.00* 3.73 5.35 6.49   HEMOGLOBIN g/dL 12.4* 11.4* 11.9* 12.9*   PLATELETS 10*3/mm3 125* 119* 124* 158     Results from last 7 days   Lab Units 01/03/21  0612 01/02/21  2317 01/02/21  1510 01/01/21  0833 12/31/20  1008   SODIUM mmol/L 138  --  134* 136 136   POTASSIUM mmol/L 3.8  --  4.0 3.6 4.5   CHLORIDE mmol/L 104  --  101 107 103   CO2 mmol/L 20.7*  --  23.5 21.5* 21.7*   BUN mg/dL 13  --  11 11 15   CREATININE mg/dL 0.82 0.72* 0.90 0.71* 0.88   CALCIUM mg/dL 8.8  --  8.6 7.9* 8.6   Estimated Creatinine Clearance: 65.7 mL/min (by C-G formula based on SCr of 0.82 mg/dL).    Medication Review: reviewed     Assessment/Plan       Pneumonia due to COVID-19 virus    Hyperlipidemia    Hypertension    Rheumatoid arthritis (CMS/[facility])    Hypoxia    TBI (traumatic brain injury) (CMS/[facility])    Neutropenia (CMS/[facility])          Plan:   (Pleasant 86yo gentleman readmitted last night for worsening COViD pneumonia (left sided) with hypoxia.    Continue Remdesivir and Decadron  Supplemental O2 as needed  Trending inflammatory markers, LDH/CRP/Ferritin up today  Tm101.2 at midnight  PCT wnl, no indication for abx at present  Mild neutropenia likely due to acute viral illness, monitor closely    During all interaction with pt proper PPE was utilized (gown, gloves, mask, goggles, and face shield) and was donned/doffed according to recommendations. Hands were cleaned before and after interaction.    Full code confirmed  Lovenox for DVT ppx  Further orders to follow as suggested by evolving hospital course).       Himanshu Butler MD  01/03/21  14:05 EST    Time:  30min

## 2021-01-03 NOTE — ED NOTES
Attempted to call report but was told nurse is busy and will call back in few minutes.      Yanira Bernabe, DURAN  01/02/21 2011

## 2021-01-03 NOTE — OUTREACH NOTE
COVID-19 Week 1 Survey      Responses   [facility] [place] patient discharged from?  [place]   Does the patient have one of the following disease processes/diagnoses(primary or secondary)?  COVID-19   COVID-19 underlying condition?  None   Call Number  Call 2   Week 1 Call successful?  No   Revoke  Readmitted   Discharge diagnosis  **COVID-19 virus detected Pneumonia due to COVID-19 virus           Jocelyne Villarreal RN

## 2021-01-04 PROBLEM — D63.8 ANEMIA, CHRONIC DISEASE: Status: ACTIVE | Noted: 2021-01-04

## 2021-01-04 PROBLEM — B02.9 SHINGLES: Status: ACTIVE | Noted: 2021-01-04

## 2021-01-04 PROBLEM — R79.89 ELEVATED LIVER FUNCTION TESTS: Status: ACTIVE | Noted: 2021-01-04

## 2021-01-04 LAB
ALBUMIN SERPL-MCNC: 3.1 G/DL (ref 3.5–5.2)
ALBUMIN/GLOB SERPL: 1.4 G/DL
ALP SERPL-CCNC: 54 U/L (ref 39–117)
ALT SERPL W P-5'-P-CCNC: 26 U/L (ref 1–41)
ANION GAP SERPL CALCULATED.3IONS-SCNC: 8.2 MMOL/L (ref 5–15)
AST SERPL-CCNC: 40 U/L (ref 1–40)
BASOPHILS # BLD AUTO: 0 10*3/MM3 (ref 0–0.2)
BASOPHILS NFR BLD AUTO: 0 % (ref 0–1.5)
BILIRUB CONJ SERPL-MCNC: <0.2 MG/DL (ref 0–0.3)
BILIRUB SERPL-MCNC: 0.3 MG/DL (ref 0–1.2)
BUN SERPL-MCNC: 18 MG/DL (ref 8–23)
BUN/CREAT SERPL: 27.7 (ref 7–25)
CALCIUM SPEC-SCNC: 8.7 MG/DL (ref 8.6–10.5)
CHLORIDE SERPL-SCNC: 105 MMOL/L (ref 98–107)
CK SERPL-CCNC: 165 U/L (ref 20–200)
CO2 SERPL-SCNC: 22.8 MMOL/L (ref 22–29)
CREAT SERPL-MCNC: 0.65 MG/DL (ref 0.76–1.27)
CRP SERPL-MCNC: 7.96 MG/DL (ref 0–0.5)
DEPRECATED RDW RBC AUTO: 42.6 FL (ref 37–54)
EOSINOPHIL # BLD AUTO: 0 10*3/MM3 (ref 0–0.4)
EOSINOPHIL NFR BLD AUTO: 0 % (ref 0.3–6.2)
ERYTHROCYTE [DISTWIDTH] IN BLOOD BY AUTOMATED COUNT: 12.7 % (ref 12.3–15.4)
FERRITIN SERPL-MCNC: 3421 NG/ML (ref 30–400)
GFR SERPL CREATININE-BSD FRML MDRD: 117 ML/MIN/1.73
GLOBULIN UR ELPH-MCNC: 2.2 GM/DL
GLUCOSE SERPL-MCNC: 146 MG/DL (ref 65–99)
HCT VFR BLD AUTO: 33.6 % (ref 37.5–51)
HGB BLD-MCNC: 11.6 G/DL (ref 13–17.7)
IMM GRANULOCYTES # BLD AUTO: 0.03 10*3/MM3 (ref 0–0.05)
IMM GRANULOCYTES NFR BLD AUTO: 0.7 % (ref 0–0.5)
LYMPHOCYTES # BLD AUTO: 0.7 10*3/MM3 (ref 0.7–3.1)
LYMPHOCYTES NFR BLD AUTO: 15.5 % (ref 19.6–45.3)
MCH RBC QN AUTO: 31.8 PG (ref 26.6–33)
MCHC RBC AUTO-ENTMCNC: 34.5 G/DL (ref 31.5–35.7)
MCV RBC AUTO: 92.1 FL (ref 79–97)
MONOCYTES # BLD AUTO: 0.53 10*3/MM3 (ref 0.1–0.9)
MONOCYTES NFR BLD AUTO: 11.8 % (ref 5–12)
NEUTROPHILS NFR BLD AUTO: 3.25 10*3/MM3 (ref 1.7–7)
NEUTROPHILS NFR BLD AUTO: 72 % (ref 42.7–76)
NRBC BLD AUTO-RTO: 0.2 /100 WBC (ref 0–0.2)
PLATELET # BLD AUTO: 135 10*3/MM3 (ref 140–450)
PMV BLD AUTO: 10.1 FL (ref 6–12)
POTASSIUM SERPL-SCNC: 3.8 MMOL/L (ref 3.5–5.2)
PROT SERPL-MCNC: 5.3 G/DL (ref 6–8.5)
RBC # BLD AUTO: 3.65 10*6/MM3 (ref 4.14–5.8)
SODIUM SERPL-SCNC: 136 MMOL/L (ref 136–145)
WBC # BLD AUTO: 4.51 10*3/MM3 (ref 3.4–10.8)

## 2021-01-04 PROCEDURE — 82248 BILIRUBIN DIRECT: CPT | Performed by: HOSPITALIST

## 2021-01-04 PROCEDURE — 82728 ASSAY OF FERRITIN: CPT | Performed by: HOSPITALIST

## 2021-01-04 PROCEDURE — 85025 COMPLETE CBC W/AUTO DIFF WBC: CPT | Performed by: HOSPITALIST

## 2021-01-04 PROCEDURE — 80053 COMPREHEN METABOLIC PANEL: CPT | Performed by: HOSPITALIST

## 2021-01-04 PROCEDURE — 86140 C-REACTIVE PROTEIN: CPT | Performed by: HOSPITALIST

## 2021-01-04 PROCEDURE — 63710000001 DEXAMETHASONE PER 0.25 MG: Performed by: HOSPITALIST

## 2021-01-04 PROCEDURE — 82550 ASSAY OF CK (CPK): CPT | Performed by: HOSPITALIST

## 2021-01-04 PROCEDURE — 25010000002 ENOXAPARIN PER 10 MG: Performed by: HOSPITALIST

## 2021-01-04 RX ORDER — HYDROCODONE BITARTRATE AND ACETAMINOPHEN 5; 325 MG/1; MG/1
1 TABLET ORAL EVERY 6 HOURS PRN
Status: DISCONTINUED | OUTPATIENT
Start: 2021-01-04 | End: 2021-01-06 | Stop reason: HOSPADM

## 2021-01-04 RX ADMIN — ACETAMINOPHEN 650 MG: 325 TABLET, FILM COATED ORAL at 01:50

## 2021-01-04 RX ADMIN — REMDESIVIR 100 MG: 100 INJECTION, POWDER, LYOPHILIZED, FOR SOLUTION INTRAVENOUS at 21:15

## 2021-01-04 RX ADMIN — TAMSULOSIN HYDROCHLORIDE 0.4 MG: 0.4 CAPSULE ORAL at 21:15

## 2021-01-04 RX ADMIN — DEXAMETHASONE 6 MG: 4 TABLET ORAL at 08:35

## 2021-01-04 RX ADMIN — MUPIROCIN 1 APPLICATION: 20 OINTMENT TOPICAL at 06:58

## 2021-01-04 RX ADMIN — LEVETIRACETAM 500 MG: 500 TABLET, FILM COATED ORAL at 21:15

## 2021-01-04 RX ADMIN — FAMOTIDINE 20 MG: 20 TABLET, FILM COATED ORAL at 08:34

## 2021-01-04 RX ADMIN — ENOXAPARIN SODIUM 40 MG: 40 INJECTION SUBCUTANEOUS at 21:15

## 2021-01-04 RX ADMIN — LEVETIRACETAM 500 MG: 500 TABLET, FILM COATED ORAL at 08:35

## 2021-01-04 RX ADMIN — Medication 1 TABLET: at 08:35

## 2021-01-04 RX ADMIN — ACETAMINOPHEN 650 MG: 325 TABLET, FILM COATED ORAL at 23:34

## 2021-01-04 RX ADMIN — MUPIROCIN 1 APPLICATION: 20 OINTMENT TOPICAL at 21:15

## 2021-01-04 NOTE — PLAN OF CARE
Goal Outcome Evaluation:  Plan of Care Reviewed With: patient  Progress: improving  Outcome Summary: pt is alert and oriented, forgetful, room air, no falls, bed alarm on, remdesivir, steroid, medicated with tylenol for mild back pain, continue to monitor

## 2021-01-04 NOTE — PROGRESS NOTES
Name: Juan C Ca ADMIT: 2021   : 1935  PCP: Tej Cedillo Jr., MD    MRN: [id number] LOS: 2 days   AGE/SEX: 85 y.o. male  ROOM: [facility]     Subjective   Subjective   Patient reports mild pain rash on the left loin for the last 2 days.  No fever or chills.  No chest pain.  Positive for occasional dry cough.  No wheezing  No hemoptysis.  No PND.  No orthopnea.    Review of Systems  GI.  No abdominal pain or nausea or vomiting.     Objective   Objective   Vital Signs  Temp:  [95.6 °F (35.3 °C)-97.6 °F (36.4 °C)] 96.5 °F (35.8 °C)  Heart Rate:  [53-69] 53  Resp:  [16-20] 18  BP: (123-172)/() 140/81  SpO2:  [93 %-96 %] 94 %  on   ;   Device (Oxygen Therapy): room air    Intake/Output Summary (Last 24 hours) at 2021 1256  Last data filed at 2021 0151  Gross per 24 hour   Intake 600 ml   Output --   Net 600 ml     Body mass index is 22.84 kg/m².      21  1442 21  211   Weight: 69.9 kg (154 lb) 68.1 kg (150 lb 3.2 oz)     Physical Exam  General elderly gentleman.  Alert oriented x3 no apparent pain distress or diaphoresis normal affect  Eyes.  Pupils equal round and reactive intact extraocular musculature no pallor no jaundice normal conjunctivae and lids  Face.  Dressed wound on the right cheek  ENT.  Moist mucous membrane  Neck.  Supple no JVD no lymphadenopathy  Cardiovascular.  Regular rate and rhythm bradycardia grade 2 systolic murmur  Chest.  Poor but clear to auscultation bilaterally with no added sounds  Abdomen.  Soft lax no tenderness no organomegaly no guarding or rebound  Extremities.  No clubbing cyanosis or edema  CNS.  No acute focal neurological deficits.  Skin.  Erythematous maculopapular rash with occasional vesicles on the left groin and mid back.    Results Review:      Results from last 7 days   Lab Units 21  0726 21  0612 21  2317 21  1510 21  0833 20  1008   SODIUM mmol/L 136 138  --  134* 136 136   POTASSIUM  mmol/L 3.8 3.8  --  4.0 3.6 4.5   CHLORIDE mmol/L 105 104  --  101 107 103   CO2 mmol/L 22.8 20.7*  --  23.5 21.5* 21.7*   BUN mg/dL 18 13  --  11 11 15   CREATININE mg/dL 0.65* 0.82 0.72* 0.90 0.71* 0.88   GLUCOSE mg/dL 146* 87  --  80 114* 115*   CALCIUM mg/dL 8.7 8.8  --  8.6 7.9* 8.6   AST (SGOT) U/L 40 50* 48* 51* 34 38   ALT (SGPT) U/L 26 27 26 28 22 22     Estimated Creatinine Clearance: 67.4 mL/min (A) (by C-G formula based on SCr of 0.65 mg/dL (L)).          Results from last 7 days   Lab Units 01/04/21  0726 01/03/21  0612 01/02/21  2317 01/01/21  0833 12/31/20  1008   CK TOTAL U/L 165 289*  --  301*  --    TROPONIN T ng/mL  --  0.016 0.022  --  <0.010     Results from last 7 days   Lab Units 12/31/20  1008   PROBNP pg/mL 255.2               Invalid input(s):  PHOS        Invalid input(s): LDLCALC  Results from last 7 days   Lab Units 01/04/21  0726 01/03/21  0612 01/02/21  1510 01/01/21  0833  12/31/20  1008   WBC 10*3/mm3 4.51 2.00* 3.73 5.35  --  6.49   HEMOGLOBIN g/dL 11.6* 12.4* 11.4* 11.9*  --  12.9*   HEMATOCRIT % 33.6* 36.6* 33.9* 35.1*  --  39.9   PLATELETS 10*3/mm3 135* 125* 119* 124*  --  158   MCV fL 92.1 93.6 94.2 93.4  --  95.0   MCH pg 31.8 31.7 31.7 31.6  --  30.7   MCHC g/dL 34.5 33.9 33.6 33.9  --  32.3   RDW % 12.7 12.5 12.4 12.2*  --  12.5   RDW-SD fl 42.6 43.1 42.5 41.3  --  43.7   MPV fL 10.1 10.2 9.5 10.2  --  10.0   NEUTROPHIL % % 72.0 70.5 71.5 77.1*  --  85.7*   LYMPHOCYTE % % 15.5* 20.0 10.2* 13.5*  --  5.7*   MONOCYTES % % 11.8 8.5 17.7* 8.2  --  8.0   EOSINOPHIL % % 0.0* 0.0* 0.0* 0.2*  --  0.0*   BASOPHIL % % 0.0 0.5 0.3 0.4  --  0.3   IMM GRAN % % 0.7* 0.5 0.3 0.6*   < >  --    NEUTROS ABS 10*3/mm3 3.25 1.41* 2.67 4.13  --  5.56   LYMPHS ABS 10*3/mm3 0.70 0.40* 0.38* 0.72  --  0.37*   MONOS ABS 10*3/mm3 0.53 0.17 0.66 0.44  --  0.52   EOS ABS 10*3/mm3 0.00 0.00 0.00 0.01  --  0.00   BASOS ABS 10*3/mm3 0.00 0.01 0.01 0.02  --  0.02   IMMATURE GRANS (ABS) 10*3/mm3 0.03 0.01  0.01 0.03   < >  --    NRBC /100 WBC 0.2 0.0 0.0 0.0   < >  --     < > = values in this interval not displayed.     Results from last 7 days   Lab Units 12/31/20  1008   INR  1.02         Results from last 7 days   Lab Units 01/03/21  0612 01/02/21  1510 12/31/20  1509 12/31/20  1008   PROCALCITONIN ng/mL  --   --  0.24 0.09   LACTATE mmol/L 1.4 1.0  --  1.1     Results from last 7 days   Lab Units 01/04/21  0726 01/03/21  0612 01/01/21  0833   CRP mg/dL 7.96* 13.44* 9.48*     Results from last 7 days   Lab Units 12/31/20  1008   LIPASE U/L 48     Results from last 7 days   Lab Units 01/02/21  1525 12/31/20  1033 12/31/20  1008   BLOODCX  No growth at 24 hours  No growth at 24 hours No growth at 4 days No growth at 4 days     Results from last 7 days   Lab Units 12/31/20  1010   ADENOVIRUS DETECTION BY PCR  Not Detected   CORONAVIRUS 229E  Not Detected   CORONAVIRUS HKU1  Not Detected   CORONAVIRUS NL63  Not Detected   CORONAVIRUS OC43  Not Detected   HUMAN METAPNEUMOVIRUS  Not Detected   HUMAN RHINOVIRUS/ENTEROVIRUS  Not Detected   INFLUENZA B PCR  Not Detected   PARAINFLUENZA 1  Not Detected   PARAINFLUENZA VIRUS 2  Not Detected   PARAINFLUENZA VIRUS 3  Not Detected   PARAINFLUENZA VIRUS 4  Not Detected   BORDETELLA PERTUSSIS PCR  Not Detected   CHLAMYDOPHILA PNEUMONIAE PCR  Not Detected   MYCOPLAMA PNEUMO PCR  Not Detected   INFLUENZA A PCR  Not Detected   RSV, PCR  Not Detected     Results from last 7 days   Lab Units 12/31/20  1033   NITRITE UA  Negative           Imaging:  Imaging Results (Last 24 Hours)     ** No results found for the last 24 hours. **             I reviewed the patient's new clinical results / labs / tests / procedures      Assessment/Plan     Active Hospital Problems    Diagnosis  POA   • **Pneumonia due to COVID-19 virus [U07.1, J12.82]  Yes   • Shingles [B02.9]  No   • Elevated liver function tests [R79.89]  Yes   • Anemia, chronic disease [D63.8]  Unknown   • Neutropenia (CMS/HCC)  [D70.9]  No   • TBI (traumatic brain injury) (CMS/HCC) [S06.9X9A]  Yes   • Hypoxia [R09.02]  Yes   • Hypertension [I10]  Yes   • Rheumatoid arthritis (CMS/HCC) [M06.9]  Yes   • Hyperlipidemia [E78.5]  Yes      Resolved Hospital Problems   No resolved problems to display.           · COVID-19 pneumonia.  Currently on dexamethasone and remdesivir.  Improving respiratory status.  Inflammatory markers with mixed response.  Continue VTE prophylaxis with Lovenox.  Monitor CBC/CMP/inflammatory marker  · Leukopenia.  Mostly secondary to viral infection resolved.  · Thrombocytopenia.  No active bleed.  Stable.  · Anemia.  Stable hemoglobin will monitor.  · Elevated liver function test.  Mostly secondary to Covid infection.  This has resolved.  Benign GI examination.  · Hypertension.  Good control.  No evidence of angina or congestive heart failure.  Continue observation.  Currently on no treatment.  · Shingles.  Clinically stable.  Currently on Decadron.  Will add low-dose Neurontin.  · Discussed with patient.      Deanna Delgado MD  [facility]  01/04/21  12:56 EST

## 2021-01-04 NOTE — NURSING NOTE
WOCN Consult: history facial skin graft. Dressings covering right cheek and arm. Discussed with unit RN.  Daughter communicated with derm today. Plan to cleanse with saline and apply bactroban edges of wound.  Please call if any further needs

## 2021-01-05 LAB
ALBUMIN SERPL-MCNC: 3.1 G/DL (ref 3.5–5.2)
ALBUMIN/GLOB SERPL: 1.3 G/DL
ALP SERPL-CCNC: 55 U/L (ref 39–117)
ALT SERPL W P-5'-P-CCNC: 31 U/L (ref 1–41)
ANION GAP SERPL CALCULATED.3IONS-SCNC: 10.1 MMOL/L (ref 5–15)
AST SERPL-CCNC: 44 U/L (ref 1–40)
BACTERIA SPEC AEROBE CULT: NORMAL
BACTERIA SPEC AEROBE CULT: NORMAL
BASOPHILS # BLD AUTO: 0.01 10*3/MM3 (ref 0–0.2)
BASOPHILS NFR BLD AUTO: 0.2 % (ref 0–1.5)
BILIRUB CONJ SERPL-MCNC: 0.2 MG/DL (ref 0–0.3)
BILIRUB SERPL-MCNC: 0.3 MG/DL (ref 0–1.2)
BUN SERPL-MCNC: 19 MG/DL (ref 8–23)
BUN/CREAT SERPL: 33.3 (ref 7–25)
CALCIUM SPEC-SCNC: 8.4 MG/DL (ref 8.6–10.5)
CHLORIDE SERPL-SCNC: 108 MMOL/L (ref 98–107)
CK SERPL-CCNC: 112 U/L (ref 20–200)
CO2 SERPL-SCNC: 21.9 MMOL/L (ref 22–29)
CREAT SERPL-MCNC: 0.57 MG/DL (ref 0.76–1.27)
CRP SERPL-MCNC: 3.57 MG/DL (ref 0–0.5)
D DIMER PPP FEU-MCNC: 0.41 MCGFEU/ML (ref 0–0.49)
DEPRECATED RDW RBC AUTO: 41.8 FL (ref 37–54)
EOSINOPHIL # BLD AUTO: 0 10*3/MM3 (ref 0–0.4)
EOSINOPHIL NFR BLD AUTO: 0 % (ref 0.3–6.2)
ERYTHROCYTE [DISTWIDTH] IN BLOOD BY AUTOMATED COUNT: 12.8 % (ref 12.3–15.4)
FERRITIN SERPL-MCNC: 3462 NG/ML (ref 30–400)
FIBRINOGEN PPP-MCNC: 545 MG/DL (ref 219–464)
GFR SERPL CREATININE-BSD FRML MDRD: 137 ML/MIN/1.73
GLOBULIN UR ELPH-MCNC: 2.3 GM/DL
GLUCOSE SERPL-MCNC: 117 MG/DL (ref 65–99)
HCT VFR BLD AUTO: 34 % (ref 37.5–51)
HGB BLD-MCNC: 11.8 G/DL (ref 13–17.7)
IMM GRANULOCYTES # BLD AUTO: 0.02 10*3/MM3 (ref 0–0.05)
IMM GRANULOCYTES NFR BLD AUTO: 0.3 % (ref 0–0.5)
LDH SERPL-CCNC: 334 U/L (ref 135–225)
LYMPHOCYTES # BLD AUTO: 0.69 10*3/MM3 (ref 0.7–3.1)
LYMPHOCYTES NFR BLD AUTO: 10.8 % (ref 19.6–45.3)
MCH RBC QN AUTO: 31.3 PG (ref 26.6–33)
MCHC RBC AUTO-ENTMCNC: 34.7 G/DL (ref 31.5–35.7)
MCV RBC AUTO: 90.2 FL (ref 79–97)
MONOCYTES # BLD AUTO: 0.6 10*3/MM3 (ref 0.1–0.9)
MONOCYTES NFR BLD AUTO: 9.4 % (ref 5–12)
NEUTROPHILS NFR BLD AUTO: 5.09 10*3/MM3 (ref 1.7–7)
NEUTROPHILS NFR BLD AUTO: 79.3 % (ref 42.7–76)
NRBC BLD AUTO-RTO: 0 /100 WBC (ref 0–0.2)
PLATELET # BLD AUTO: 167 10*3/MM3 (ref 140–450)
PMV BLD AUTO: 10.1 FL (ref 6–12)
POTASSIUM SERPL-SCNC: 4 MMOL/L (ref 3.5–5.2)
PROT SERPL-MCNC: 5.4 G/DL (ref 6–8.5)
RBC # BLD AUTO: 3.77 10*6/MM3 (ref 4.14–5.8)
SODIUM SERPL-SCNC: 140 MMOL/L (ref 136–145)
WBC # BLD AUTO: 6.41 10*3/MM3 (ref 3.4–10.8)

## 2021-01-05 PROCEDURE — 82550 ASSAY OF CK (CPK): CPT | Performed by: HOSPITALIST

## 2021-01-05 PROCEDURE — 83615 LACTATE (LD) (LDH) ENZYME: CPT | Performed by: HOSPITALIST

## 2021-01-05 PROCEDURE — 85384 FIBRINOGEN ACTIVITY: CPT | Performed by: HOSPITALIST

## 2021-01-05 PROCEDURE — 25010000002 ENOXAPARIN PER 10 MG: Performed by: HOSPITALIST

## 2021-01-05 PROCEDURE — 85379 FIBRIN DEGRADATION QUANT: CPT | Performed by: HOSPITALIST

## 2021-01-05 PROCEDURE — 82248 BILIRUBIN DIRECT: CPT | Performed by: HOSPITALIST

## 2021-01-05 PROCEDURE — 86140 C-REACTIVE PROTEIN: CPT | Performed by: HOSPITALIST

## 2021-01-05 PROCEDURE — 85025 COMPLETE CBC W/AUTO DIFF WBC: CPT | Performed by: HOSPITALIST

## 2021-01-05 PROCEDURE — 82728 ASSAY OF FERRITIN: CPT | Performed by: HOSPITALIST

## 2021-01-05 PROCEDURE — 80053 COMPREHEN METABOLIC PANEL: CPT | Performed by: HOSPITALIST

## 2021-01-05 PROCEDURE — 63710000001 DEXAMETHASONE PER 0.25 MG: Performed by: HOSPITALIST

## 2021-01-05 RX ADMIN — Medication 1 TABLET: at 09:10

## 2021-01-05 RX ADMIN — TAMSULOSIN HYDROCHLORIDE 0.4 MG: 0.4 CAPSULE ORAL at 20:04

## 2021-01-05 RX ADMIN — MUPIROCIN 1 APPLICATION: 20 OINTMENT TOPICAL at 09:11

## 2021-01-05 RX ADMIN — ENOXAPARIN SODIUM 40 MG: 40 INJECTION SUBCUTANEOUS at 20:04

## 2021-01-05 RX ADMIN — LEVETIRACETAM 500 MG: 500 TABLET, FILM COATED ORAL at 20:04

## 2021-01-05 RX ADMIN — LEVETIRACETAM 500 MG: 500 TABLET, FILM COATED ORAL at 09:10

## 2021-01-05 RX ADMIN — FAMOTIDINE 20 MG: 20 TABLET, FILM COATED ORAL at 09:10

## 2021-01-05 RX ADMIN — REMDESIVIR 100 MG: 100 INJECTION, POWDER, LYOPHILIZED, FOR SOLUTION INTRAVENOUS at 20:00

## 2021-01-05 RX ADMIN — MUPIROCIN 1 APPLICATION: 20 OINTMENT TOPICAL at 20:04

## 2021-01-05 RX ADMIN — DEXAMETHASONE 6 MG: 4 TABLET ORAL at 09:10

## 2021-01-05 NOTE — PROGRESS NOTES
Continued Stay Note  [facility]     Patient Name: Juan C Ca  MRN: [id number]  Today's Date: 1/5/2021    Admit Date: 1/2/2021    Discharge Plan     Row Name 01/05/21 1626       Plan    Plan Comments  Spoke with daughter, Hannah, by telephone.  She understands that patient will likely be ready for dc.  She wants to know patient's level of functioning prior to dc.  Advised that CCP will call her after patient works with PT to discuss if HH is needed.  She also wants to make sure that IS is sent home with patient at [place].  Nurse, elvia Pedroza. Kimberly Cotton RN    Row Name 01/05/21 6443       Plan    Plan  Home with family    Patient/Family in Agreement with Plan  yes    Plan Comments  CCP made inbound call to patient’s room due to isolation. Patient gave CCP permission to speak with his daughter, Nitza. CCP made outbound call to Nitza [phone]. Nitza placed CCP on speaker phone with patient’s wife and their other daughter, Abbie. Face sheet verified and IMM noted. Patient’s PCP is Dr. Cedillo. Patient lives with his wife and daughter. Patient has 4 steps to the entrance of his home. Patient has access to a walker and cane but does not typically use them. Patient has used home health in the past but denies any SNF history. Patient’s daughter states she is helping patient and his wife because they all have COVID. Patient’s daughter inquired if patient has worked with physical therapy and has asked PT to see him to make sure he is at his baseline. Patient’s daughter requested MD call patient’s daughter, Hannah for an update. CCP notified Greta/[facility]. CCP will follow for PT eval and assist with discharging home and arranging home health if it is needed. Elisha Talbert [facility]        Discharge Codes    No documentation.             Kimberly Cotton RN

## 2021-01-05 NOTE — PROGRESS NOTES
Name: Juan C Ca ADMIT: 2021   : 1935  PCP: Tej Cedillo Jr., MD    MRN: [id number] LOS: 3 days   AGE/SEX: 85 y.o. male  ROOM: [facility]     Subjective   Subjective   No change in the painful rash on the left loin for the last 2 days.  No fever or chills.  No chest pain.  Positive for occasional dry cough.  No wheezing  No hemoptysis.  No shortness of breath.  No PND.  No orthopnea.    Review of Systems    GI.  No abdominal pain or nausea or vomiting.     Objective   Objective   Vital Signs  Temp:  [96.4 °F (35.8 °C)-98.6 °F (37 °C)] 97.6 °F (36.4 °C)  Heart Rate:  [65-66] 65  Resp:  [16-20] 20  BP: (152-175)/(72-86) 153/72  SpO2:  [94 %-97 %] 97 %  on   ;   Device (Oxygen Therapy): room air    Intake/Output Summary (Last 24 hours) at 2021 1308  Last data filed at 2021 0906  Gross per 24 hour   Intake 140 ml   Output --   Net 140 ml     Body mass index is 22.84 kg/m².      21  1442 21  2118   Weight: 69.9 kg (154 lb) 68.1 kg (150 lb 3.2 oz)     Physical Exam    General elderly gentleman.  Alert oriented x3 no apparent pain distress or diaphoresis normal affect  Eyes.  Pupils equal round and reactive intact extraocular musculature no pallor no jaundice normal conjunctivae and lids  Face.  Dressed wound on the right cheek(which was removed yesterday)  ENT.  Moist mucous membrane  Neck.  Supple no JVD no lymphadenopathy  Cardiovascular.  Regular rate and rhythm bradycardia grade 2 systolic murmur  Chest.  Poor but clear to auscultation bilaterally with no added sounds  Abdomen.  Soft lax no tenderness no organomegaly no guarding or rebound  Extremities.  No clubbing cyanosis or edema  CNS.  No acute focal neurological deficits.  Skin.  Erythematous maculopapular rash with occasional vesicles on the left groin and mid back.    Results Review:      Results from last 7 days   Lab Units 21  0557 21  0726 21  0612 21  2317 21  1510 21  0833  12/31/20  1008   SODIUM mmol/L 140 136 138  --  134* 136 136   POTASSIUM mmol/L 4.0 3.8 3.8  --  4.0 3.6 4.5   CHLORIDE mmol/L 108* 105 104  --  101 107 103   CO2 mmol/L 21.9* 22.8 20.7*  --  23.5 21.5* 21.7*   BUN mg/dL 19 18 13  --  11 11 15   CREATININE mg/dL 0.57* 0.65* 0.82 0.72* 0.90 0.71* 0.88   GLUCOSE mg/dL 117* 146* 87  --  80 114* 115*   CALCIUM mg/dL 8.4* 8.7 8.8  --  8.6 7.9* 8.6   AST (SGOT) U/L 44* 40 50* 48* 51* 34 38   ALT (SGPT) U/L 31 26 27 26 28 22 22     Estimated Creatinine Clearance: 67.4 mL/min (A) (by C-G formula based on SCr of 0.57 mg/dL (L)).          Results from last 7 days   Lab Units 01/05/21  0557 01/04/21  0726 01/03/21  0612 01/02/21  2317 01/01/21  0833 12/31/20  1008   CK TOTAL U/L 112 165 289*  --  301*  --    TROPONIN T ng/mL  --   --  0.016 0.022  --  <0.010     Results from last 7 days   Lab Units 12/31/20  1008   PROBNP pg/mL 255.2               Invalid input(s):  PHOS        Invalid input(s): LDLCALC  Results from last 7 days   Lab Units 01/05/21  0557 01/04/21  0726 01/03/21  0612 01/02/21  1510 01/01/21  0833  12/31/20  1008   WBC 10*3/mm3 6.41 4.51 2.00* 3.73 5.35  --  6.49   HEMOGLOBIN g/dL 11.8* 11.6* 12.4* 11.4* 11.9*  --  12.9*   HEMATOCRIT % 34.0* 33.6* 36.6* 33.9* 35.1*  --  39.9   PLATELETS 10*3/mm3 167 135* 125* 119* 124*  --  158   MCV fL 90.2 92.1 93.6 94.2 93.4  --  95.0   MCH pg 31.3 31.8 31.7 31.7 31.6  --  30.7   MCHC g/dL 34.7 34.5 33.9 33.6 33.9  --  32.3   RDW % 12.8 12.7 12.5 12.4 12.2*  --  12.5   RDW-SD fl 41.8 42.6 43.1 42.5 41.3  --  43.7   MPV fL 10.1 10.1 10.2 9.5 10.2  --  10.0   NEUTROPHIL % % 79.3* 72.0 70.5 71.5 77.1*  --  85.7*   LYMPHOCYTE % % 10.8* 15.5* 20.0 10.2* 13.5*  --  5.7*   MONOCYTES % % 9.4 11.8 8.5 17.7* 8.2  --  8.0   EOSINOPHIL % % 0.0* 0.0* 0.0* 0.0* 0.2*  --  0.0*   BASOPHIL % % 0.2 0.0 0.5 0.3 0.4  --  0.3   IMM GRAN % % 0.3 0.7* 0.5 0.3 0.6*   < >  --    NEUTROS ABS 10*3/mm3 5.09 3.25 1.41* 2.67 4.13  --  5.56   LYMPHS ABS  10*3/mm3 0.69* 0.70 0.40* 0.38* 0.72  --  0.37*   MONOS ABS 10*3/mm3 0.60 0.53 0.17 0.66 0.44  --  0.52   EOS ABS 10*3/mm3 0.00 0.00 0.00 0.00 0.01  --  0.00   BASOS ABS 10*3/mm3 0.01 0.00 0.01 0.01 0.02  --  0.02   IMMATURE GRANS (ABS) 10*3/mm3 0.02 0.03 0.01 0.01 0.03   < >  --    NRBC /100 WBC 0.0 0.2 0.0 0.0 0.0   < >  --     < > = values in this interval not displayed.     Results from last 7 days   Lab Units 12/31/20  1008   INR  1.02         Results from last 7 days   Lab Units 01/03/21  0612 01/02/21  1510 12/31/20  1509 12/31/20  1008   PROCALCITONIN ng/mL  --   --  0.24 0.09   LACTATE mmol/L 1.4 1.0  --  1.1     Results from last 7 days   Lab Units 01/05/21  0557 01/04/21  0726 01/03/21  0612 01/01/21  0833   CRP mg/dL 3.57* 7.96* 13.44* 9.48*     Results from last 7 days   Lab Units 12/31/20  1008   LIPASE U/L 48     Results from last 7 days   Lab Units 01/02/21  1525 12/31/20  1033 12/31/20  1008   BLOODCX  No growth at 2 days  No growth at 2 days No growth at 5 days No growth at 5 days     Results from last 7 days   Lab Units 12/31/20  1010   ADENOVIRUS DETECTION BY PCR  Not Detected   CORONAVIRUS 229E  Not Detected   CORONAVIRUS HKU1  Not Detected   CORONAVIRUS NL63  Not Detected   CORONAVIRUS OC43  Not Detected   HUMAN METAPNEUMOVIRUS  Not Detected   HUMAN RHINOVIRUS/ENTEROVIRUS  Not Detected   INFLUENZA B PCR  Not Detected   PARAINFLUENZA 1  Not Detected   PARAINFLUENZA VIRUS 2  Not Detected   PARAINFLUENZA VIRUS 3  Not Detected   PARAINFLUENZA VIRUS 4  Not Detected   BORDETELLA PERTUSSIS PCR  Not Detected   CHLAMYDOPHILA PNEUMONIAE PCR  Not Detected   MYCOPLAMA PNEUMO PCR  Not Detected   INFLUENZA A PCR  Not Detected   RSV, PCR  Not Detected     Results from last 7 days   Lab Units 12/31/20  1033   NITRITE UA  Negative           Imaging:  Imaging Results (Last 24 Hours)     ** No results found for the last 24 hours. **             I reviewed the patient's new clinical results / labs / tests /  procedures      Assessment/Plan     Active Hospital Problems    Diagnosis  POA   • **Pneumonia due to COVID-19 virus [U07.1, J12.82]  Yes   • Shingles [B02.9]  No   • Elevated liver function tests [R79.89]  Yes   • Anemia, chronic disease [D63.8]  Unknown   • Neutropenia (CMS/HCC) [D70.9]  No   • TBI (traumatic brain injury) (CMS/[facility]) [S06.9X9A]  Yes   • Hypoxia [R09.02]  Yes   • Hypertension [I10]  Yes   • Rheumatoid arthritis (CMS/HCC) [M06.9]  Yes   • Hyperlipidemia [E78.5]  Yes      Resolved Hospital Problems   No resolved problems to display.           · COVID-19 pneumonia.  Currently on dexamethasone and remdesivir.  Improving respiratory status.  Inflammatory markers with mixed response but mostly trending down..  Continue VTE prophylaxis with Lovenox.  Monitor CBC/CMP/inflammatory marker.  Negative blood cultures.  Weaned off oxygen.  · Leukopenia.  Mostly secondary to viral infection resolved.  · Thrombocytopenia.  No active bleed.  Possibly secondary to Covid.  Resolved.   · Anemia.  Stable hemoglobin will monitor.  · Elevated liver function test.  Mostly secondary to Covid infection.  This has resolved.  Benign GI examination.  · Hypertension.  Good control.  No evidence of angina or congestive heart failure.  Continue observation.  Currently on no treatment.  · Shingles.  Clinically stable.  Currently on Decadron/Neurontin..  Discussed with patient.  Anticipate home tomorrow.               Daenna Delgado MD  [facility]  01/05/21  13:08 EST

## 2021-01-05 NOTE — PROGRESS NOTES
Discharge Planning Assessment  [facility]     Patient Name: Juan C Ca  MRN: [id number]  Today's Date: 1/5/2021    Admit Date: 1/2/2021    Discharge Needs Assessment     Row Name 01/05/21 1423       Living Environment    Lives With  child(chuck), adult;spouse    Current Living Arrangements  home/apartment/condo    Potentially Unsafe Housing Conditions  other (see comments) no concerns    Primary Care Provided by  self    Provides Primary Care For  no one    Family Caregiver if Needed  child(chuck), adult    Quality of Family Relationships  supportive;helpful;involved    Able to Return to Prior Arrangements  yes       Resource/Environmental Concerns    Resource/Environmental Concerns  none    Transportation Concerns  car, none       Transition Planning    Patient/Family Anticipates Transition to  home    Transportation Anticipated  family or friend will provide       Discharge Needs Assessment    Readmission Within the Last 30 Days  no previous admission in last 30 days    Equipment Currently Used at Home  walker, rolling;cane, straight    Concerns to be Addressed  no discharge needs identified;denies needs/concerns at this time    Anticipated Changes Related to Illness  none        Discharge Plan     Row Name 01/05/21 1424       Plan    Plan  Home with family    Patient/Family in Agreement with Plan  yes    Plan Comments  CCP made inbound call to patient’s room due to isolation. Patient gave CCP permission to speak with his daughter, Nitza. CCP made outbound call to Nitza [phone]. Nitza placed CCP on speaker phone with patient’s wife and their other daughter, Abbie. Face sheet verified and IMM noted. Patient’s PCP is Dr. Cedillo. Patient lives with his wife and daughter. Patient has 4 steps to the entrance of his home. Patient has access to a walker and cane but does not typically use them. Patient has used home health in the past but denies any SNF history. Patient’s daughter states she is helping  patient and his wife because they all have COVID. Patient’s daughter inquired if patient has worked with physical therapy and has asked PT to see him to make sure he is at his baseline. Patient’s daughter requested MD call patient’s daughter, Hannah for an update. CCP notified Greta/[facility]. CCP will follow for PT eval and assist with discharging home and arranging home health if it is needed. Elisha LOMAS        Continued Care and Services - Admitted Since 1/2/2021    Coordination has not been started for this encounter.         Demographic Summary     Row Name 01/05/21 1423       General Information    Admission Type  inpatient    Arrived From  emergency department    Required Notices Provided  Important Message from Medicare    Referral Source  admission list    Reason for Consult  discharge planning    Preferred Language  English     Used During This Interaction  no        Functional Status     Row Name 01/05/21 1423       Functional Status    Usual Activity Tolerance  good    Current Activity Tolerance  moderate       Functional Status, IADL    Medications  independent    Meal Preparation  independent    Housekeeping  independent    Laundry  independent    Shopping  independent        Psychosocial    No documentation.       Abuse/Neglect    No documentation.       Legal    No documentation.       Substance Abuse    No documentation.       Patient Forms    No documentation.           ABEBE Vgea

## 2021-01-05 NOTE — PLAN OF CARE
Goal Outcome Evaluation:  Plan of Care Reviewed With: patient  Progress: no change  Outcome Summary: VSS, BP's elevated this shift. C/o pain to left side shingles rash. Treated with prn Tylenol. Rested well over night. Remdesivir and Decadron continuing. AAOx4. Afebrile. Diarrhea has resolved. Not presenting with any major symptoms of Covid. Continuing to monitor.

## 2021-01-05 NOTE — PROGRESS NOTES
Case Management Discharge Note      Final Note: DC'd home 1/1                 Transportation Services  Private: Car    Final Discharge Disposition Code: 01 - home or self-care

## 2021-01-05 NOTE — PLAN OF CARE
Goal Outcome Evaluation:  Plan of Care Reviewed With: patient  Progress: no change  Outcome Summary: No complaints of pain this shift. Remains on RA. Continues dexamethasone and remdisiver. Possible DC 1/6.Dressing under right eye changed, cleansed with saline, covered with Bactroban and clean 2x2. Will continue to monitor.

## 2021-01-06 ENCOUNTER — READMISSION MANAGEMENT (OUTPATIENT)
Dept: CALL CENTER | Facility: HOSPITAL | Age: 86
End: 2021-01-06

## 2021-01-06 VITALS
HEART RATE: 64 BPM | TEMPERATURE: 97.6 F | RESPIRATION RATE: 20 BRPM | SYSTOLIC BLOOD PRESSURE: 168 MMHG | BODY MASS INDEX: 22.76 KG/M2 | WEIGHT: 150.2 LBS | HEIGHT: 68 IN | OXYGEN SATURATION: 97 % | DIASTOLIC BLOOD PRESSURE: 77 MMHG

## 2021-01-06 PROBLEM — R09.02 HYPOXIA: Status: RESOLVED | Noted: 2019-06-18 | Resolved: 2021-01-06

## 2021-01-06 PROBLEM — S06.9XAA TBI (TRAUMATIC BRAIN INJURY) (HCC): Status: RESOLVED | Noted: 2020-12-31 | Resolved: 2021-01-06

## 2021-01-06 PROBLEM — D70.9 NEUTROPENIA: Status: RESOLVED | Noted: 2021-01-03 | Resolved: 2021-01-06

## 2021-01-06 LAB
ALBUMIN SERPL-MCNC: 3 G/DL (ref 3.5–5.2)
ALBUMIN/GLOB SERPL: 1.4 G/DL
ALP SERPL-CCNC: 56 U/L (ref 39–117)
ALT SERPL W P-5'-P-CCNC: 57 U/L (ref 1–41)
ANION GAP SERPL CALCULATED.3IONS-SCNC: 5.9 MMOL/L (ref 5–15)
AST SERPL-CCNC: 65 U/L (ref 1–40)
BASOPHILS # BLD AUTO: 0.01 10*3/MM3 (ref 0–0.2)
BASOPHILS NFR BLD AUTO: 0.2 % (ref 0–1.5)
BILIRUB CONJ SERPL-MCNC: 0.2 MG/DL (ref 0–0.3)
BILIRUB SERPL-MCNC: 0.4 MG/DL (ref 0–1.2)
BUN SERPL-MCNC: 18 MG/DL (ref 8–23)
BUN/CREAT SERPL: 30 (ref 7–25)
CALCIUM SPEC-SCNC: 8.1 MG/DL (ref 8.6–10.5)
CHLORIDE SERPL-SCNC: 108 MMOL/L (ref 98–107)
CK SERPL-CCNC: 57 U/L (ref 20–200)
CO2 SERPL-SCNC: 27.1 MMOL/L (ref 22–29)
CREAT SERPL-MCNC: 0.6 MG/DL (ref 0.76–1.27)
CRP SERPL-MCNC: 2.38 MG/DL (ref 0–0.5)
DEPRECATED RDW RBC AUTO: 41.4 FL (ref 37–54)
EOSINOPHIL # BLD AUTO: 0 10*3/MM3 (ref 0–0.4)
EOSINOPHIL NFR BLD AUTO: 0 % (ref 0.3–6.2)
ERYTHROCYTE [DISTWIDTH] IN BLOOD BY AUTOMATED COUNT: 12.6 % (ref 12.3–15.4)
FERRITIN SERPL-MCNC: 2758 NG/ML (ref 30–400)
GFR SERPL CREATININE-BSD FRML MDRD: 129 ML/MIN/1.73
GLOBULIN UR ELPH-MCNC: 2.2 GM/DL
GLUCOSE SERPL-MCNC: 111 MG/DL (ref 65–99)
HCT VFR BLD AUTO: 34.2 % (ref 37.5–51)
HGB BLD-MCNC: 11.7 G/DL (ref 13–17.7)
IMM GRANULOCYTES # BLD AUTO: 0.02 10*3/MM3 (ref 0–0.05)
IMM GRANULOCYTES NFR BLD AUTO: 0.5 % (ref 0–0.5)
LYMPHOCYTES # BLD AUTO: 0.78 10*3/MM3 (ref 0.7–3.1)
LYMPHOCYTES NFR BLD AUTO: 17.7 % (ref 19.6–45.3)
MCH RBC QN AUTO: 31.2 PG (ref 26.6–33)
MCHC RBC AUTO-ENTMCNC: 34.2 G/DL (ref 31.5–35.7)
MCV RBC AUTO: 91.2 FL (ref 79–97)
MONOCYTES # BLD AUTO: 0.68 10*3/MM3 (ref 0.1–0.9)
MONOCYTES NFR BLD AUTO: 15.5 % (ref 5–12)
NEUTROPHILS NFR BLD AUTO: 2.91 10*3/MM3 (ref 1.7–7)
NEUTROPHILS NFR BLD AUTO: 66.1 % (ref 42.7–76)
NRBC BLD AUTO-RTO: 0 /100 WBC (ref 0–0.2)
PLATELET # BLD AUTO: 175 10*3/MM3 (ref 140–450)
PMV BLD AUTO: 10.3 FL (ref 6–12)
POTASSIUM SERPL-SCNC: 3.9 MMOL/L (ref 3.5–5.2)
PROT SERPL-MCNC: 5.2 G/DL (ref 6–8.5)
RBC # BLD AUTO: 3.75 10*6/MM3 (ref 4.14–5.8)
SODIUM SERPL-SCNC: 141 MMOL/L (ref 136–145)
WBC # BLD AUTO: 4.4 10*3/MM3 (ref 3.4–10.8)

## 2021-01-06 PROCEDURE — 80053 COMPREHEN METABOLIC PANEL: CPT | Performed by: HOSPITALIST

## 2021-01-06 PROCEDURE — 82728 ASSAY OF FERRITIN: CPT | Performed by: HOSPITALIST

## 2021-01-06 PROCEDURE — 85025 COMPLETE CBC W/AUTO DIFF WBC: CPT | Performed by: HOSPITALIST

## 2021-01-06 PROCEDURE — 86140 C-REACTIVE PROTEIN: CPT | Performed by: HOSPITALIST

## 2021-01-06 PROCEDURE — 82248 BILIRUBIN DIRECT: CPT | Performed by: HOSPITALIST

## 2021-01-06 PROCEDURE — 63710000001 DEXAMETHASONE PER 0.25 MG: Performed by: HOSPITALIST

## 2021-01-06 PROCEDURE — 97161 PT EVAL LOW COMPLEX 20 MIN: CPT

## 2021-01-06 PROCEDURE — 82550 ASSAY OF CK (CPK): CPT | Performed by: HOSPITALIST

## 2021-01-06 PROCEDURE — 97530 THERAPEUTIC ACTIVITIES: CPT

## 2021-01-06 RX ORDER — DEXAMETHASONE 6 MG/1
6 TABLET ORAL
Qty: 5 TABLET | Refills: 0 | Status: SHIPPED | OUTPATIENT
Start: 2021-01-07 | End: 2021-01-12

## 2021-01-06 RX ORDER — FAMOTIDINE 20 MG/1
20 TABLET, FILM COATED ORAL DAILY
Qty: 30 TABLET | Refills: 3 | Status: SHIPPED | OUTPATIENT
Start: 2021-01-07

## 2021-01-06 RX ORDER — METOPROLOL TARTRATE 50 MG/1
25 TABLET, FILM COATED ORAL 2 TIMES DAILY
Qty: 60 TABLET | Refills: 3 | Status: SHIPPED | OUTPATIENT
Start: 2021-01-06

## 2021-01-06 RX ADMIN — FAMOTIDINE 20 MG: 20 TABLET, FILM COATED ORAL at 09:33

## 2021-01-06 RX ADMIN — MUPIROCIN 1 APPLICATION: 20 OINTMENT TOPICAL at 20:01

## 2021-01-06 RX ADMIN — Medication 1 TABLET: at 09:33

## 2021-01-06 RX ADMIN — ACETAMINOPHEN 650 MG: 325 TABLET, FILM COATED ORAL at 09:35

## 2021-01-06 RX ADMIN — DEXAMETHASONE 6 MG: 4 TABLET ORAL at 09:33

## 2021-01-06 RX ADMIN — TAMSULOSIN HYDROCHLORIDE 0.4 MG: 0.4 CAPSULE ORAL at 20:01

## 2021-01-06 RX ADMIN — MUPIROCIN 1 APPLICATION: 20 OINTMENT TOPICAL at 09:34

## 2021-01-06 RX ADMIN — REMDESIVIR 100 MG: 100 INJECTION, POWDER, LYOPHILIZED, FOR SOLUTION INTRAVENOUS at 18:27

## 2021-01-06 RX ADMIN — LEVETIRACETAM 500 MG: 500 TABLET, FILM COATED ORAL at 09:33

## 2021-01-06 RX ADMIN — LEVETIRACETAM 500 MG: 500 TABLET, FILM COATED ORAL at 20:01

## 2021-01-06 NOTE — DISCHARGE PLACEMENT REQUEST
"Mohan Barcenas (85 y.o. Male)     Date of Birth Social Security Number Address Home Phone MRN    1935  [id number]   [place] [address] [phone] [id number]    Scientologist Marital Status          [place]        Admission Date Admission Type Admitting Provider Attending Provider Department, Room/Bed    1/2/21 Emergency RayJagdish MD Hussein, Samer H, MD [address], [address]/1    Discharge Date Discharge Disposition Discharge Destination         Home or Self Care              Attending Provider: Deanna Delgado MD    Allergies: Sulfadiazine    Isolation: Enh Drop/Con   Infection: COVID (confirmed) (12/31/20)   Code Status: CPR    Ht: 172.7 cm (68\")   Wt: 68.1 kg (150 lb 3.2 oz)    Admission Cmt: None   Principal Problem: Pneumonia due to COVID-19 virus [U07.1,J12.82]                 Active Insurance as of 1/2/2021     Primary Coverage     Payor Plan Insurance Group Employer/Plan Group    MEDICARE MEDICARE A & B      Payor Plan Address Payor Plan Phone Number Payor Plan Fax Number Effective Dates    [address] [phone]  12/1/2002 - None Entered    [facility] [id number]       Subscriber Name Subscriber Birth Date Member ID       MOHAN BARCENAS 1935 [id number]           Secondary Coverage     Payor Plan Insurance Group Employer/Plan Group    [facility] SUPP [id number]     Payor Plan Address Payor Plan Phone Number Payor Plan Fax Number Effective Dates    [address]   12/1/2016 - None Entered    [facility] [id number]       Subscriber Name Subscriber Birth Date Member ID       MOHNA BARCENAS 1935 [id number]                 Emergency Contacts      (Rel.) Home Phone Work Phone Mobile Phone    BarcenasAbbie gomez (Spouse) [phone] -- [phone]    EDMUNDOEDWINA SWANN (Daughter) [phone] -- --    Hannah Welch (Daughter) -- -- [phone]              "

## 2021-01-06 NOTE — PROGRESS NOTES
Continued Stay Note  [facility]     Patient Name: Juan C Ca  MRN: [id number]  Today's Date: 1/6/2021    Admit Date: 1/2/2021    Discharge Plan     Row Name 01/06/21 1421       Plan    Plan  Return home with family assistance and with [facility] following.    Patient/Family in Agreement with Plan  yes    Plan Comments  Spoke with daughter Hannah by telephone.  She is aware that MD has ordered  for patient - she would like [facility] to follow.  Spoke with Kemi/[facility] and they will evaluate (awaiting acceptance). She states her 2 sisters will be staying with her parents and assisting.  BHumeniuk RN            Expected Discharge Date and Time     Expected Discharge Date Expected Discharge Time    Jan 6, 2021             Becky S. Humeniuk, RN

## 2021-01-06 NOTE — PLAN OF CARE
"Pt is an 85 y.o. male admitted with Pneumonia due to COVID-19 virus, along with shingles, HTN, and anemia. Pt reports PLOF as independent. Lives with wife with assist from daughter if needed. There are 4 steps with HR to access home. Prior to hospitalization, pt did not use any AD however does own \"stroller\" and cane if needed. Pt is independent for all transfers and bed mobility. Pt ambulated in room with modified independence 20ft x 3 limited to in-room treatment. Pt did have 1-2 episodes of furniture surfing, reaching out with hands as well as trendelenberg gait pattern. Pt attributes to flare up in arthritis from not doing much the past few days. No acute PT needs at this time. Pt near functional baseline, safe for DC home-no additional supervision needed. Educated for patient to use his cane for the first 1-2 days at home.    .Patient was intermittently wearing a face mask during this therapy encounter. Therapist used appropriate personal protective equipment including gown, eye protection, mask and gloves.  Mask used was standard procedure mask. Appropriate PPE was worn during the entire therapy session. Hand hygiene was completed before and after therapy session. Patient is in enhanced droplet precautions.    Problem: Adult Inpatient Plan of Care  Goal: Plan of Care Review  Outcome: Met  Flowsheets (Taken 1/6/2021 1155)  Plan of Care Reviewed With: patient   Goal Outcome Evaluation:  Plan of Care Reviewed With: patient  Progress: improving     "

## 2021-01-06 NOTE — DISCHARGE SUMMARY
Patient Name: Juan C Ca  : 1935  MRN: [id number]    Date of Admission: 2021  Date of Discharge:  2021  Primary Care Physician: Tej Cedillo Jr., MD      Discharge Diagnoses     Active Hospital Problems    Diagnosis  POA   • **Pneumonia due to COVID-19 virus [U07.1, J12.82]  Yes   • Shingles [B02.9]  No   • Elevated liver function tests [R79.89]  Yes   • Anemia, chronic disease [D63.8]  Yes   • Hypertension [I10]  Yes   • Rheumatoid arthritis (CMS/HCC) [M06.9]  Yes   • Hyperlipidemia [E78.5]  Yes      Resolved Hospital Problems    Diagnosis Date Resolved POA   • Neutropenia (CMS/HCC) [D70.9] 2021 No   • TBI (traumatic brain injury) (CMS/HCC) [S06.9X9A] 2021 Yes   • Hypoxia [R09.02] 2021 Yes        Hospital Course     Brief admission history and physical.  Please refer to the H&P for full details.  A pleasant 85 years old white gentleman with a past history of hypertension/skin cancer/cerebrovascular disease/iron deficiency anemia/dyslipidemia/rheumatoid arthritis/seizure who presented to the hospital with fever and chills with generalized fatigue associated with shortness of breath myalgia and diarrhea.  He was diagnosed with Covid 19 pneumonia recently did not qualify for any treatment was discharged.  His physical examination at the time of admission included a temperature of 99.2 a pulse of 79 respiratory rate of 20 and blood pressure 165/80 and O2 sats of 92% on room air.  The rest of the examination is remarkable for an ill-appearing gentleman otherwise no significant abnormalities.  Hospital course.  ER evaluation included a CMP that was normal except for sodium of 134 AST of 51.  Lactate was normal.  CBC was normal except for hemoglobin of 11.4 and platelets 119.  Chest x-ray revealed persistent left lung infiltrate that is more pronounced compared to previous examination.  The impression on this gentleman was Covidien 19 and pneumonia with mild hypoxemia.   He did not qualify for any pharmacological treatment at the recent admission however because of the hypoxemia and acute illness in this immunocompromised patient.  His Arava and Plaquenil were stopped he was placed on dexamethasone and remdesivir inflammatory markers were followed and he was placed on VTE prophylaxis with Lovenox.  Blood cultures were obtained and they were negative by the time of discharge.  His condition improved and his respiratory status improved we were able to wean him off the oxygen.  Inflammatory markers have been decreasing.  He has developed leukopenia and thrombocytopenia both has resolved and were thought to be secondary to the COVID-19 pneumonia.  His diarrhea has resolved.  He was noted to be anemic and his anemia remained stable with a hemoglobin around the baseline.  He was noted to have an elevated liver function test which was thought initially to be secondary to the COVID-19 infection this was monitored and remained stable with a benign GI examination.  He has a history of hypertension his blood pressure was on the high side during this admission and at discharge he was placed on a low-dose Lopressor and this needs to be followed up.  He developed shingles during his hospital stay affecting the right side of the chest and the back and he is likely on the steroids and remdesivir.  Also on Keppra.  No treatment further is needed at this time.  He has a history of rheumatoid arthritis as mentioned above Plaquenil and Arava was held at the time of discharge Plaquenil was resumed Arava will be held for another 2 days until rheumatology okays the resumption.  At the time of discharge patient was hemodynamically stable.  I discussed the case with him and with his daughter per his request    Consultants     Consult Orders (all) (From admission, onward)     Start     Ordered    01/02/21 6783  [facility] (on-call MD unless specified) Details  Once     Specialty:  Hospitalist  Provider:  (Not yet  assigned)    01/02/21 1725              Procedures     Imaging Results (All)     Procedure Component Value Units Date/Time    XR Chest 1 View [[id number]] Collected: 01/02/21 1540     Updated: 01/02/21 1545    Narrative:      XR CHEST 1 VW-     Clinical: Fever     COMPARISON 12/31/2020     FINDINGS: There is vague infiltrate within the left mid lung zone  slightly more pronounced compared to the previous examination. The right  lung is clear. No edema or effusion. The cardiomediastinal silhouette is  satisfactory in appearance. Biapical pleural thickening and scarring  similar to the previous examination.     CONCLUSION: Persistent left lung infiltrate, more pronounced compared to  12/31/2020.     This report was finalized on 1/2/2021 3:41 PM by Dr. Tim Thayer M.D.             Pertinent Labs     Results from last 7 days   Lab Units 01/06/21 0651 01/05/21 0557 01/04/21 0726 01/03/21 0612   WBC 10*3/mm3 4.40 6.41 4.51 2.00*   HEMOGLOBIN g/dL 11.7* 11.8* 11.6* 12.4*   PLATELETS 10*3/mm3 175 167 135* 125*     Results from last 7 days   Lab Units 01/06/21 0651 01/05/21  0557 01/04/21 0726 01/03/21 0612   SODIUM mmol/L 141 140 136 138   POTASSIUM mmol/L 3.9 4.0 3.8 3.8   CHLORIDE mmol/L 108* 108* 105 104   CO2 mmol/L 27.1 21.9* 22.8 20.7*   BUN mg/dL 18 19 18 13   CREATININE mg/dL 0.60* 0.57* 0.65* 0.82   GLUCOSE mg/dL 111* 117* 146* 87   Estimated Creatinine Clearance: 67.4 mL/min (A) (by C-G formula based on SCr of 0.6 mg/dL (L)).  Results from last 7 days   Lab Units 01/06/21 0651 01/05/21 0557 01/04/21 0726 01/03/21 0612   ALBUMIN g/dL 3.00* 3.10* 3.10* 3.30*   BILIRUBIN mg/dL 0.4 0.3 0.3 0.4   ALK PHOS U/L 56 55 54 59   AST (SGOT) U/L 65* 44* 40 50*   ALT (SGPT) U/L 57* 31 26 27     Results from last 7 days   Lab Units 01/06/21  0651 01/05/21  0557 01/04/21  0726 01/03/21  0612   CALCIUM mg/dL 8.1* 8.4* 8.7 8.8   ALBUMIN g/dL 3.00* 3.10* 3.10* 3.30*     Results from last 7 days   Lab Units  12/31/20  1008   LIPASE U/L 48     Results from last 7 days   Lab Units 01/06/21  0651 01/05/21  0557 01/04/21  0726 01/03/21  0612 01/02/21  2317 01/01/21  0833  12/31/20  1509 12/31/20  1008   CK TOTAL U/L 57 112 165 289*  --  301*   < >  --   --    TROPONIN T ng/mL  --   --   --  0.016 0.022  --   --   --  <0.010   PROBNP pg/mL  --   --   --   --   --   --   --   --  255.2   D DIMER QUANT MCGFEU/mL  --  0.41  --  1.05*  --  0.79*  --  1.02*  --     < > = values in this interval not displayed.           Invalid input(s): LDLCALC  Results from last 7 days   Lab Units 01/02/21  1525 12/31/20  1033 12/31/20  1008   BLOODCX  No growth at 3 days  No growth at 3 days No growth at 5 days No growth at 5 days     Imaging Results (Last 24 Hours)     ** No results found for the last 24 hours. **          Test Results Pending at Discharge     Pending Labs     Order Current Status    Blood Culture - Blood, Arm, Left Preliminary result    Blood Culture - Blood, Arm, Right Preliminary result            Discharge Exam   Physical Exam  Vitals.  Computer 97 a pulse of 81 respiratory 2019 and blood pressure 171/83 and O2 sats of 97% on room air  General elderly gentleman.  Alert oriented x3 no apparent pain distress or diaphoresis normal affect  Eyes.  Pupils equal round and reactive intact extraocular musculature no pallor no jaundice normal conjunctivae and lids  Face.    Healthy skin graft wound on the right cheek with no evidence of infection.  ENT.  Moist mucous membrane  Neck.  Supple no JVD no lymphadenopathy  Cardiovascular.  Regular rate and rhythm bradycardia grade 2 systolic murmur  Chest.  Poor but clear to auscultation bilaterally with no added sounds  Abdomen.  Soft lax no tenderness no organomegaly no guarding or rebound  Extremities.  No clubbing cyanosis or edema  CNS.  No acute focal neurological deficits.  Skin.  Erythematous maculopapular rash with occasional vesicles on the left groin and mid  back.     Discharge Details        Discharge Medications      New Medications      Instructions Start Date   dexamethasone 6 MG tablet  Commonly known as: DECADRON   6 mg, Oral, Daily With Breakfast   Start Date: January 7, 2021     famotidine 20 MG tablet  Commonly known as: PEPCID   20 mg, Oral, Daily   Start Date: January 7, 2021     metoprolol tartrate 50 MG tablet  Commonly known as: Lopressor   25 mg, Oral, 2 Times Daily         Continue These Medications      Instructions Start Date   acetaminophen 500 MG tablet  Commonly known as: TYLENOL   1,000 mg, Oral, Every 6 Hours PRN      cetirizine 10 MG tablet  Commonly known as: zyrTEC   5 mg, Oral, Daily      docusate sodium 100 MG capsule   100 mg, Oral, 2 Times Daily      guaiFENesin 100 MG/5ML syrup  Commonly known as: ROBITUSSIN   200 mg, Oral, Nightly PRN      hydroxychloroquine 200 MG tablet  Commonly known as: PLAQUENIL   200 mg, Oral, 2 Times Daily      levETIRAcetam 500 MG tablet  Commonly known as: KEPPRA   500 mg, Oral, 2 Times Daily      loperamide 2 MG capsule  Commonly known as: IMODIUM   2 mg, Oral, 2 Times Daily PRN, OTC      multivitamin tablet tablet  Commonly known as: THERAGRAN   1 tablet, Oral, Daily      mupirocin 2 % ointment  Commonly known as: BACTROBAN   1 application, Topical, 2 Times Daily, To graft sites       NON FORMULARY   1 dose, Topical, 2 times daily, Water and vinegar to 2 skin graft sites       ondansetron 4 MG tablet  Commonly known as: Zofran   4 mg, Oral, Every 8 Hours PRN      tamsulosin 0.4 MG capsule 24 hr capsule  Commonly known as: FLOMAX   Take 1 capsule by mouth every night.      vitamin D 1.25 MG (14366 UT) capsule capsule  Commonly known as: ERGOCALCIFEROL   50,000 Units, Oral, Every 14 Days      ZICAM COLD REMEDY PO   1 capsule, Oral, Daily         Stop These Medications    leflunomide 20 MG tablet  Commonly known as: ARAVA            Allergies   Allergen Reactions   • Sulfadiazine Unknown - High Severity      fever  Other reaction(s): Fever         Discharge Disposition:  Condition: Stable    Diet:   Diet Order   Procedures   • Diet Regular       Activity:   Activity Instructions     Activity as Tolerated            Counseling : Self quarantine x10 days    CODE STATUS:    Code Status and Medical Interventions:   Ordered at: 01/02/21 2151     Code Status:    CPR     Medical Interventions (Level of Support Prior to Arrest):    Full       Future Appointments   Date Time Provider Department Center   1/18/2021 11:00 AM Jana Jones MD [facility] None     Additional Instructions for the Follow-ups that You Need to Schedule     Ambulatory Referral to Home Health   As directed      Face to Face Visit Date: 1/6/2021    Follow-up provider for Plan of Care?: I treated the patient in an acute care facility and will not continue treatment after discharge.    Follow-up provider: ELISSA TAPIA [[id number]]    Reason/Clinical Findings: COVID-19 pneumonia/rheumatoid arthritis/right skin graft wound    Describe mobility limitations that make leaving home difficult: COVID-19 pneumonia    Nursing/Therapeutic Services Requested: Skilled Nursing    Skilled nursing orders: Wound care dressing/changes Cardiopulmonary assessments    Frequency: 1 Week 1         Call MD With Problems / Concerns   As directed      Instructions: Call MD or return to ER if fever chills/chest pain/shortness of breath/cough/nausea or vomiting    Order Comments: Instructions: Call MD or return to ER if fever chills/chest pain/shortness of breath/cough/nausea or vomiting          Discharge Follow-up with PCP   As directed       Currently Documented PCP:    Tej Cedillo Jr., MD    PCP Phone Number:    [phone]     Follow Up Details: Primary MD in 10 days for COVID-19 pneumonia/hypertension/rheumatoid arthritis         Discharge Follow-up with Specified Provider: Rheumatology; 2 Weeks   As directed      To: Rheumatology    Follow Up: 2 Weeks    Follow Up  Details: RA           Follow-up Information     Tej Cedillo Jr., MD .    Specialty: Internal Medicine  Why: Primary MD in 10 days for COVID-19 pneumonia/hypertension/rheumatoid arthritis  Contact information:  [address] [place] [address] [phone]                     Time Spent on Discharge:  Greater than 30 minutes      Deanna Delgado MD  [facility]  01/06/21  10:09 EST

## 2021-01-06 NOTE — THERAPY EVALUATION
Patient Name: Juan C Ca  : 1935    MRN: [id number]                              Today's Date: 2021       Admit Date: 2021    Visit Dx:     ICD-10-CM ICD-9-CM   1. Fever, unspecified fever cause  R50.9 780.60   2. COVID-19  U07.1 079.89   3. Hypoxia  R09.02 799.02   4. Herpes zoster without complication  B02.9 053.9   5. Pneumonia due to COVID-19 virus  U07.1 480.8    J12.82      Patient Active Problem List   Diagnosis   • Iron deficiency anemia   • Adverse effect of iron   • Febrile illness, acute   • Hyperlipidemia   • Hypertension   • Rheumatoid arthritis (CMS/HCC)   • Seizures (CMS/HCC)   • Drug induced fever   • Hypokalemia   • Anemia of chronic disease   • Urinary frequency   • Pneumonia of left lung due to infectious organism   • COVID-19 virus detected   • Pneumonia due to COVID-19 virus   • Dehydration   • Shingles   • Elevated liver function tests   • Anemia, chronic disease     Past Medical History:   Diagnosis Date   • Cancer (CMS/HCC)     skin cancer   • CVD (cerebrovascular disease)     with remote infarcts per CT of head.   • H/O Iron deficiency anemia    • H/O Traumatic brain injury (CMS/HCC)    • H/O: pneumonia 2015   • Hyperlipidemia    • Hypertension    • Rheumatoid arthritis (CMS/HCC)    • Seizures (CMS/HCC)     Started in 1960s.     Past Surgical History:   Procedure Laterality Date   • APPENDECTOMY     • CATARACT EXTRACTION     • COLONOSCOPY N/A 2017    Procedure: COLONOSCOPY TO CECUM AND TI WITH APC CAUTERY OF RIGHT COLON AVM ;  Surgeon: Lucio Stein MD;  Location: [facility] ENDOSCOPY;  Service:    • ENDOSCOPY N/A 2017    Procedure: ESOPHAGOGASTRODUODENOSCOPY with BX ;  Surgeon: Lucio Stein MD;  Location: [facility] ENDOSCOPY;  Service:      General Information     Row Name 21 1158          Physical Therapy Time and Intention    Document Type  evaluation  -AE     Mode of Treatment  individual therapy;physical therapy  -AE     Row Name 21  1158          General Information    Patient Profile Reviewed  yes  -AE     Prior Level of Function  independent:  -AE     Existing Precautions/Restrictions  fall  -AE     Row Name 01/06/21 1158          Living Environment    Lives With  child(chuck), adult;spouse  -AE     Row Name 01/06/21 1158          Home Main Entrance    Number of Stairs, Main Entrance  four  -AE     Stair Railings, Main Entrance  railings safe and in good condition;railings on both sides of stairs  -AE     Row Name 01/06/21 1158          Stairs Within Home, Primary    Number of Stairs, Within Home, Primary  none  -AE     Row Name 01/06/21 1158          Cognition    Orientation Status (Cognition)  oriented x 4  -AE     Row Name 01/06/21 1158          Safety Issues, Functional Mobility    Impairments Affecting Function (Mobility)  endurance/activity tolerance;strength;shortness of breath  -AE       User Key  (r) = Recorded By, (t) = Taken By, (c) = Cosigned By    Initials Name Provider Type    AE Donna Villa PT Physical Therapist        Mobility     Row Name 01/06/21 1159          Bed Mobility    Bed Mobility  bed mobility (all) activities  -AE     All Activities, Rhea (Bed Mobility)  independent  -AE     Row Name 01/06/21 1159          Transfers    Comment (Transfers)  independent all transfers no AD  -AE     Row Name 01/06/21 1159          Bed-Chair Transfer    Bed-Chair Rhea (Transfers)  independent  -AE     Row Name 01/06/21 1159          Gait/Stairs (Locomotion)    Rhea Level (Gait)  independent  -AE       User Key  (r) = Recorded By, (t) = Taken By, (c) = Cosigned By    Initials Name Provider Type    AE Donna Villa PT Physical Therapist        Obj/Interventions     Row Name 01/06/21 1159          Range of Motion Comprehensive    Comment, General Range of Motion  BLE WFL  -AE     Row Name 01/06/21 1159          Strength Comprehensive (MMT)    Comment, General Manual Muscle Testing (MMT) Assessment   generalized weakness  -AE       User Key  (r) = Recorded By, (t) = Taken By, (c) = Cosigned By    Initials Name Provider Type    AE Donna Villa, PT Physical Therapist        Goals/Plan    No documentation.       Clinical Impression     Row Name 01/06/21 1159          Pain    Additional Documentation  Pain Scale: Numbers Pre/Post-Treatment (Group)  -AE     Row Name 01/06/21 1159          Pain Scale: Numbers Pre/Post-Treatment    Pretreatment Pain Rating  0/10 - no pain  -AE     Posttreatment Pain Rating  0/10 - no pain  -AE     Pain Intervention(s)  Repositioned;Ambulation/increased activity;Rest  -AE     Row Name 01/06/21 1159          Plan of Care Review    Plan of Care Reviewed With  patient  -AE     Row Name 01/06/21 1159          Therapy Assessment/Plan (PT)    Patient/Family Therapy Goals Statement (PT)  Pt plans to DC home  -AE     Criteria for Skilled Interventions Met (PT)  no;no problems identified which require skilled intervention  -AE     Row Name 01/06/21 1159          Positioning and Restraints    Pre-Treatment Position  in bed  -AE     Post Treatment Position  bed  -AE     In Bed  supine;call light within reach;encouraged to call for assist;exit alarm on  -AE       User Key  (r) = Recorded By, (t) = Taken By, (c) = Cosigned By    Initials Name Provider Type    AE Donna Villa, PT Physical Therapist        Outcome Measures     Row Name 01/06/21 1201          How much help from another person do you currently need...    Turning from your back to your side while in flat bed without using bedrails?  4  -AE     Moving from lying on back to sitting on the side of a flat bed without bedrails?  4  -AE     Moving to and from a bed to a chair (including a wheelchair)?  4  -AE     Standing up from a chair using your arms (e.g., wheelchair, bedside chair)?  4  -AE     Climbing 3-5 steps with a railing?  4  -AE     To walk in hospital room?  4  -AE     AM-PAC 6 Clicks Score (PT)  24  -AE     Row Name  01/06/21 1201          Functional Assessment    Outcome Measure Options  AM-PAC 6 Clicks Basic Mobility (PT)  -AE       User Key  (r) = Recorded By, (t) = Taken By, (c) = Cosigned By    Initials Name Provider Type    Donna Louis PT Physical Therapist        Physical Therapy Education                 Title: PT OT SLP Therapies (Done)     Topic: Physical Therapy (Done)     Point: Mobility training (Done)     Learning Progress Summary           Patient Acceptance, E,TB, VU,DU by AE at 1/6/2021 1201                   Point: Home exercise program (Done)     Learning Progress Summary           Patient Acceptance, E,TB, VU,DU by AE at 1/6/2021 1201                   Point: Body mechanics (Done)     Learning Progress Summary           Patient Acceptance, E,TB, VU,DU by AE at 1/6/2021 1201                   Point: Precautions (Done)     Learning Progress Summary           Patient Acceptance, E,TB, VU,DU by AE at 1/6/2021 1201                               User Key     Initials Effective Dates Name Provider Type Discipline    AE 09/04/19 -  Donna Villa PT Physical Therapist PT              PT Recommendation and Plan     Plan of Care Reviewed With: patient     Time Calculation:   PT Charges     Row Name 01/06/21 1208 01/06/21 1146 01/06/21 0840       Time Calculation    Start Time  1100  -AE  --  --    Stop Time  1130  -AE  --  --    Time Calculation (min)  30 min  -AE  --  --    PT Received On  01/06/21  -AE  01/06/21  -AE  --    PT - Next Appointment  --  --  01/06/21  -RD       Time Calculation- PT    Total Timed Code Minutes- PT  20 minute(s)  -AE  --  --    Row Name 01/06/21 0709             Time Calculation    PT - Next Appointment  01/07/21  -RD        User Key  (r) = Recorded By, (t) = Taken By, (c) = Cosigned By    Initials Name Provider Type    Nadine Escoto PT Physical Therapist    Donna Louis PT Physical Therapist        Therapy Charges for Today     Code Description Service Date  Service Provider Modifiers Qty    98984003388 HC PT EVAL LOW COMPLEXITY 2 1/6/2021 Donna Villa, PT GP 1    94051000511 HC PT THERAPEUTIC ACT EA 15 MIN 1/6/2021 Donna Villa, PT GP 1          PT G-Codes  Outcome Measure Options: AM-PAC 6 Clicks Basic Mobility (PT)  AM-PAC 6 Clicks Score (PT): 24    Donna Villa PT  1/6/2021

## 2021-01-06 NOTE — PLAN OF CARE
Goal Outcome Evaluation:  Plan of Care Reviewed With: patient  Progress: improving  Outcome Summary: Patient had no commplaints through the night. Ambulating with stand-by assist to bathroom. BP slightly elevated but has trended down. IV Remdesivir given.Bactroban to right cheek graft site BID. States the diarrhea is better, but stool is loose still. VSS. Possible discharge today. Will continue to monitor.

## 2021-01-07 ENCOUNTER — READMISSION MANAGEMENT (OUTPATIENT)
Dept: CALL CENTER | Facility: HOSPITAL | Age: 86
End: 2021-01-07

## 2021-01-07 LAB
BACTERIA SPEC AEROBE CULT: NORMAL
BACTERIA SPEC AEROBE CULT: NORMAL

## 2021-01-07 NOTE — OUTREACH NOTE
Prep Survey      Responses   [facility] facility patient discharged from?  [facility]   Is LACE score < 7 ?  No   Emergency Room discharge w/ pulse ox?  No   Eligibility  Readm Mgmt   Discharge diagnosis  Pneumonia due to COVID-19 virus    Does the patient have one of the following disease processes/diagnoses(primary or secondary)?  COVID-19   Does the patient have Home health ordered?  Yes   What is the Home health agency?    [facility]    Is there a DME ordered?  No   Prep survey completed?  Yes          Olga Nieves RN

## 2021-01-07 NOTE — PROGRESS NOTES
Case Management Discharge Note      Final Note: DC'd home 1/6 with [facility] following.             Home Medical Care Coordination complete    Service Provider Selected Services Address Phone Fax Patient Preferred    [facility] [place]  [facility] [address] [address] [phone] --                  Transportation Services  Private: Car    Final Discharge Disposition Code: 06 - home with home health care

## 2021-01-07 NOTE — OUTREACH NOTE
"COVID-19 Week 1 Survey      Responses   [facility] patient discharged from?  [place]   Does the patient have one of the following disease processes/diagnoses(primary or secondary)?  COVID-19   COVID-19 underlying condition?  None   Call Number  Call 1   Week 1 Call successful?  Yes   Call start time  1144   Call end time  1247   General alerts for this patient  Wife being admitted to hospital today with covid.   Is patient permission given to speak with other caregiver?  Yes   List who call center can speak with  any family member   Person spoke with today (if not patient) and relationship  daughter-Abbie Sanabria reviewed with patient/caregiver?  Yes   Is the patient having any side effects they believe may be caused by any medication additions or changes?  No   Does the patient have all medications ordered at discharge?  Yes   Is the patient taking all medications as directed (includes completed medication regime)?  Yes   Comments regarding appointments  PCP appt-01/13/21.   Does the patient have a primary care provider?   Yes   Does the patient have an appointment with their PCP or specialist within 7 days of discharge?  Yes   Has the patient kept scheduled appointments due by today?  N/A   Has home health visited the patient within 72 hours of discharge?  No   Home health interventions  Called Home Health agency   Home health comments  Spoke with Juan C at [facility]-states will \"check into it and call back\".   Psychosocial issues?  No   Psychosocial comments  Wife was just admitted to hospital today with covid.   Did the patient receive a copy of their discharge instructions?  Yes   Did the patient receive a copy of COVID-19 specific instructions?  Yes   Nursing interventions  Reviewed instructions with patient   What is the patient's perception of their health status since discharge?  Same   Does the patient have any of the following symptoms?  None [fatigue/weakness]   Nursing Interventions  Nurse provided " "patient education   Pulse Ox monitoring  Intermittent   Pulse Ox device source  Patient   O2 Sat comments  Daughter reports O2 sats \"fine\".   O2 Sat: education provided  Monitoring frequency, Sat levels, When to seek care   O2 Sat education comments  States was advised to return to ER if O2 sats remain below 90%.   Is the patient/caregiver able to teach back steps to recovery at home?  Eat a well-balance diet, Set small, achievable goals for return to baseline health, Rest and rebuild strength, gradually increase activity   Is the patient/caregiver able to teach back the hierarchy of who to call/visit for symptoms/problems? PCP, Specialist, Home health nurse, Urgent Care, ED, 911  Yes   COVID-19 call completed?  Yes   Wrap up additional comments  Daughter states patient is doing okay today-slightly upset that his wife is being admitted with covid today. States no cough, SOA, fever or chest pain. States some fatigue/weakness. States using IS-up to 2500ml. Denies any needs today.          Keisha Almaguer RN  "

## 2021-01-07 NOTE — PLAN OF CARE
Goal Outcome Evaluation:  Plan of Care Reviewed With: patient  Progress: improving  Outcome Summary: No complaints of pain this shift. Remains on RA. Continues dexamethasone and remdisiver. Possible DC 1/6.Dressing under right eye changed, cleansed with saline, covered with Bactroban and clean 2x2. Will continue to monitor.

## 2021-01-08 ENCOUNTER — READMISSION MANAGEMENT (OUTPATIENT)
Dept: CALL CENTER | Facility: HOSPITAL | Age: 86
End: 2021-01-08

## 2021-01-08 NOTE — OUTREACH NOTE
COVID-19 Week 1 Survey      Responses   [facility] patient discharged from?  [place]   Does the patient have one of the following disease processes/diagnoses(primary or secondary)?  COVID-19   COVID-19 underlying condition?  None   Call Number  Call 2   Week 1 Call successful?  Yes   Call start time  1520   Call end time  1525   General alerts for this patient  Wife being admitted to hospital today with covid.   Discharge diagnosis  Pneumonia due to COVID-19 virus    Person spoke with today (if not patient) and relationship  daughter-Abbie Jesus   Is the patient having any side effects they believe may be caused by any medication additions or changes?  No   Is the patient taking all medications as directed (includes completed medication regime)?  Yes   Comments regarding appointments  PCP appt-01/13/21, Telehealth    What is the Home health agency?    [facility]    Has home health visited the patient within 72 hours of discharge?  Yes   DME comments  CPAP has been sanitized and new tubing since RI.   Psychosocial comments  Wife was just admitted to hospital with covid, PNA.   Comments  Appetite is improved. 132/76, 86, 97.9. Pt doing deep breathing, using IS, moving about several times daily.    What is the patient's perception of their health status since discharge?  Improving   Does the patient have any of the following symptoms?  Cough [runny nose, fatigued]   Pulse Ox monitoring  Intermittent   Pulse Ox device source  Patient   O2 Sat comments  96%RA   O2 Sat: education provided  Sat levels   O2 Sat education comments  States was advised to return to ER if O2 sats remain below 90%.   Is the patient/caregiver able to teach back the hierarchy of who to call/visit for symptoms/problems? PCP, Specialist, Home health nurse, Urgent Care, ED, 911  Yes   COVID-19 call completed?  Yes          Kim Kong RN

## 2021-01-09 ENCOUNTER — READMISSION MANAGEMENT (OUTPATIENT)
Dept: CALL CENTER | Facility: HOSPITAL | Age: 86
End: 2021-01-09

## 2021-01-09 NOTE — OUTREACH NOTE
COVID-19 Week 1 Survey      Responses   [facility] patient discharged from?  [place]   Does the patient have one of the following disease processes/diagnoses(primary or secondary)?  COVID-19   COVID-19 underlying condition?  None   Call Number  Call 3   Week 1 Call successful?  Yes   Call start time  1237   Call end time  1239   General alerts for this patient  Wife being admitted to hospital today with covid.   Discharge diagnosis  Pneumonia due to COVID-19 virus    Is the patient taking all medications as directed (includes completed medication regime)?  Yes   Has the patient kept scheduled appointments due by today?  N/A   Home health comments  HH came yesterday   Psychosocial issues?  No   Does the patient have any of the following symptoms?  Cough   Nursing Interventions  Nurse provided patient education   Is the patient/caregiver able to teach back steps to recovery at home?  Rest and rebuild strength, gradually increase activity, Eat a well-balance diet   Is the patient/caregiver able to teach back the hierarchy of who to call/visit for symptoms/problems? PCP, Specialist, Home health nurse, Urgent Care, ED, 911  Yes   COVID-19 call completed?  Yes   Wrap up additional comments  Continue with good pulmonary toilet. Eating and drinking well.           Abigail Trejo, RN

## 2021-01-13 ENCOUNTER — READMISSION MANAGEMENT (OUTPATIENT)
Dept: CALL CENTER | Facility: HOSPITAL | Age: 86
End: 2021-01-13

## 2021-01-13 NOTE — OUTREACH NOTE
COVID-19 Week 2 Survey      Responses   [facility] patient discharged from?  [place]   Does the patient have one of the following disease processes/diagnoses(primary or secondary)?  COVID-19   COVID-19 underlying condition?  None   Call Number  Call 1   COVID-19 Week 2: Call 1 attempt successful?  Yes   Call start time  1620   Call end time  1625   Discharge diagnosis  Pneumonia due to COVID-19 virus    Is patient permission given to speak with other caregiver?  Yes   Person spoke with today (if not patient) and relationship  daughter-Abbie Jesus and pt   Meds reviewed with patient/caregiver?  Yes   Is the patient having any side effects they believe may be caused by any medication additions or changes?  No   Does the patient have all medications ordered at discharge?  Yes   Is the patient taking all medications as directed (includes completed medication regime)?  Yes   Medication comments  Completed Decadron   Does the patient have a primary care provider?   Yes   Does the patient have an appointment with their PCP or specialist within 7 days of discharge?  Yes   Has the patient kept scheduled appointments due by today?  Yes   Comments  Had televisit today   What is the Home health agency?    [facility]    Has home health visited the patient within 72 hours of discharge?  Yes   Psychosocial issues?  No   What is the patient's perception of their health status since discharge?  Improving   Does the patient have any of the following symptoms?  Cough [Slight cough]   Nursing Interventions  Nurse provided patient education   Pulse Ox monitoring  Intermittent   Pulse Ox device source  Patient   O2 Sat comments  Have been in the upper 90's on [place]   O2 Sat: education provided  Sat levels, When to seek care   Is the patient/caregiver able to teach back steps to recovery at home?  Rest and rebuild strength, gradually increase activity, Set small, achievable goals for return to baseline health, Eat a well-balance diet    If the patient is a current smoker, are they able to teach back resources for cessation?  Not a smoker   Is the patient/caregiver able to teach back the hierarchy of who to call/visit for symptoms/problems? PCP, Specialist, Home health nurse, Urgent Care, ED, 911  Yes   COVID-19 call completed?  Yes   Wrap up additional comments  States he is doing well and feels like he is slowly improving.  Denies any questions or needs at this time          Yary Morley LPN

## 2021-01-16 ENCOUNTER — READMISSION MANAGEMENT (OUTPATIENT)
Dept: CALL CENTER | Facility: HOSPITAL | Age: 86
End: 2021-01-16

## 2021-01-16 NOTE — OUTREACH NOTE
COVID-19 Week 2 Survey      Responses   [facility] patient discharged from?  [place]   Does the patient have one of the following disease processes/diagnoses(primary or secondary)?  COVID-19   COVID-19 underlying condition?  None   Call Number  Call 2   COVID-19 Week 2: Call 1 attempt successful?  Yes   Call start time  1037   Call end time  1041   Discharge diagnosis  Pneumonia due to COVID-19 virus    Person spoke with today (if not patient) and relationship  daughter-Abbie Sanabria reviewed with patient/caregiver?  Yes   Is the patient having any side effects they believe may be caused by any medication additions or changes?  No   Does the patient have all medications ordered at discharge?  Yes   Is the patient taking all medications as directed (includes completed medication regime)?  Yes   Does the patient have a primary care provider?   Yes   Does the patient have an appointment with their PCP or specialist within 7 days of discharge?  Yes   Has the patient kept scheduled appointments due by today?  Yes [televisit]   What is the Home health agency?    [facility]    Has home health visited the patient within 72 hours of discharge?  Yes   Psychosocial issues?  No   Did the patient receive a copy of their discharge instructions?  Yes   Did the patient receive a copy of COVID-19 specific instructions?  Yes   Nursing interventions  Reviewed instructions with patient   What is the patient's perception of their health status since discharge?  Improving   Does the patient have any of the following symptoms?  Cough [occasional cough]   Nursing Interventions  Nurse provided patient education   Pulse Ox monitoring  Intermittent   Pulse Ox device source  Patient   O2 Sat comments  93-97% on RA   O2 Sat: education provided  Sat levels, Monitoring frequency, When to seek care   Is the patient/caregiver able to teach back steps to recovery at home?  Set small, achievable goals for return to baseline health, Rest and  rebuild strength, gradually increase activity, Eat a well-balance diet   Is the patient/caregiver able to teach back the hierarchy of who to call/visit for symptoms/problems? PCP, Specialist, Home health nurse, Urgent Care, ED, 911  Yes   COVID-19 call completed?  Yes          Magda Rosa RN

## 2021-01-18 ENCOUNTER — APPOINTMENT (OUTPATIENT)
Dept: SLEEP MEDICINE | Facility: HOSPITAL | Age: 86
End: 2021-01-18

## 2021-01-23 ENCOUNTER — READMISSION MANAGEMENT (OUTPATIENT)
Dept: CALL CENTER | Facility: HOSPITAL | Age: 86
End: 2021-01-23

## 2021-01-23 NOTE — OUTREACH NOTE
COVID-19 Week 3 Survey      Responses   [facility] [place] patient discharged from?  [place]   Does the patient have one of the following disease processes/diagnoses(primary or secondary)?  COVID-19   COVID-19 underlying condition?  None   Call Number  Call 1   COVID-19 Week 3: Call 1 attempt successful?  Yes   Call start time  0940   Call end time  0947   Discharge diagnosis  Pneumonia due to COVID-19 virus    Meds reviewed with patient/caregiver?  Yes   Is the patient having any side effects they believe may be caused by any medication additions or changes?  No   Does the patient have all medications ordered at discharge?  Yes   Is the patient taking all medications as directed (includes completed medication regime)?  Yes   Comments regarding appointments  Has appt on 1/26 with Rheumat[facility] Dr. and 1/27 with PCP   Does the patient have a primary care provider?   Yes   Comments regarding PCP  Tej Cedillo Jr., MD    Does the patient have an appointment with their PCP or specialist within 7 days of discharge?  Yes   Has the patient kept scheduled appointments due by today?  Yes   Psychosocial issues?  No   Did the patient receive a copy of their discharge instructions?  Yes   Did the patient receive a copy of COVID-19 specific instructions?  Yes   Nursing interventions  Reviewed instructions with patient   What is the patient's perception of their health status since discharge?  Improving   Does the patient have any of the following symptoms?  None   Nursing Interventions  Nurse provided patient education   Pulse Ox monitoring  Intermittent   Pulse Ox device source  Patient   O2 Sat comments  92-93% on RA   O2 Sat: education provided  Sat levels   Is the patient/caregiver able to teach back steps to recovery at home?  Set small, achievable goals for return to baseline health, Rest and rebuild strength, gradually increase activity   If the patient is a current smoker, are they able to teach back resources for  cessation?  Not a smoker   Is the patient/caregiver able to teach back the hierarchy of who to call/visit for symptoms/problems? PCP, Specialist, Home health nurse, Urgent Care, ED, 911  Yes   COVID-19 call completed?  Yes   Wrap up additional comments  He currently has Shingles and has been in contact with PCP. It is harder for him to move around because he has been off of his meds for RA.          Whitney Reyes, RN

## 2021-01-30 ENCOUNTER — READMISSION MANAGEMENT (OUTPATIENT)
Dept: CALL CENTER | Facility: HOSPITAL | Age: 86
End: 2021-01-30

## 2021-01-30 NOTE — OUTREACH NOTE
COVID-19 Week 4 Survey      Responses   [facility] patient discharged from?  [place]   Does the patient have one of the following disease processes/diagnoses(primary or secondary)?  COVID-19   COVID-19 underlying condition?  None   Call Number  Call 1   COVID-19 Week 4: Call 1 attempt successful?  No   Discharge diagnosis  Pneumonia due to COVID-19 virus           Mandy Guthrie RN

## 2021-02-01 ENCOUNTER — TELEPHONE (OUTPATIENT)
Dept: SLEEP MEDICINE | Facility: HOSPITAL | Age: 86
End: 2021-02-01

## 2021-02-01 ENCOUNTER — OFFICE VISIT (OUTPATIENT)
Dept: SLEEP MEDICINE | Facility: HOSPITAL | Age: 86
End: 2021-02-01

## 2021-02-01 VITALS
OXYGEN SATURATION: 93 % | WEIGHT: 154 LBS | BODY MASS INDEX: 23.34 KG/M2 | HEART RATE: 94 BPM | SYSTOLIC BLOOD PRESSURE: 142 MMHG | HEIGHT: 68 IN | DIASTOLIC BLOOD PRESSURE: 82 MMHG

## 2021-02-01 DIAGNOSIS — Z99.89 OSA ON CPAP: Primary | ICD-10-CM

## 2021-02-01 DIAGNOSIS — G47.33 OSA ON CPAP: Primary | ICD-10-CM

## 2021-02-01 PROCEDURE — G0463 HOSPITAL OUTPT CLINIC VISIT: HCPCS

## 2021-02-01 NOTE — PROGRESS NOTES
"[facility] PULMONARY CARE         Dr Jana Jones  [unfilled]  Patient Care Team:  Tej Cedillo Jr., MD as PCP - General  Tej Cedillo Jr., MD as PCP - Family Medicine  Tej Cedillo Jr., MD as Referring Physician (Internal Medicine)  Rasheeda Novoa MD as Consulting Physician (Hematology and Oncology)    Chief Complaint:Overall apnea index of 18.9 events per hour  RDI 24 events per hour  Apnea index left side 16.4 events per hour  Apnea index on the right side 19.3 events per hour  Apnea index during REM sleep 13 events per hour  Recommend treatment pressures of 8 cm with heated humidity and ramp    Interval History: Patient here for annual compliance visit.  As you recall currently on auto CPAP 8 to 20 cm.  Compliance today 83% average daily use of 8 hours 15 minutes.  AHI leak both acceptable.  Patient tells me he is having issues with his full facemask causing some pressure ulcers on the bridge of the nose.  He also has had some skin surgeries and the current mask is bothering him.  He would like something under his nose.  Goes to bed 10 gets of 8 gets 8+ hours of sleep and generally feels rested with CPAP.  Supplies are adequate.  No tobacco no alcohol no caffeine abuse.  Currently has a full facemask with issues as discussed above.    REVIEW OF SYSTEMS:   CARDIOVASCULAR: No chest pain, chest pressure or chest discomfort. No palpitations or edema.   RESPIRATORY: No shortness of breath,  sputum.   GASTROINTESTINAL: No anorexia, nausea, vomiting or diarrhea. No abdominal pain or blood.   HEMATOLOGIC: No bleeding or bruising.  Positive for nasal congestion postnasal drainage and cough    Ventilator/Non-Invasive Ventilation Settings (From admission, onward)    None            Vital Signs  Heart Rate:  [94] 94  BP: (142)/(82) 142/82  [unfilled]  Flowsheet Rows      First Filed Value   Admission Height  172.7 cm (68\") Documented at 02/01/2021 1138   Admission Weight  69.9 kg (154 lb) " Documented at 02/01/2021 1138          Physical Exam:  Patient is examined using the personal protective equipment as per guidelines from infection control for this particular patient as enacted.  Hand hygiene was performed before and after patient interaction.   General Appearance:    Alert, cooperative, in no acute distress.  Following simple commands  Neck midline trachea no thyromegaly   Lungs:     Clear to auscultation,respirations regular, even and                  unlabored  ENT Mallampati between 3 and 4 normal nasal exam no congestion    Heart:    Regular rhythm and normal rate, normal S1 and S2, no            murmur, no gallop, no rub, no click   Chest Wall:    No abnormalities observed   Abdomen:     Normal bowel sounds, no masses, no organomegaly, soft        non-tender, non-distended, no guarding, no rebound                tenderness   Extremities:   Moves all extremities well, no edema, no cyanosis, no             redness  CNS no focal neurological deficits normal sensory exam  Skin no rashes no nodules  Musculoskeletal no cyanosis no clubbing normal range of motion     Results Review:                                          I reviewed the patient's new clinical results.  I personally viewed and interpreted the patient's CXR        Medication Review:       No current facility-administered medications for this visit.       ASSESSMENT:   CRISTAL on CPAP  Recent Covid infection  Rheumatoid arthritis  Seizure disorder  History of tobacco abuse      PLAN:  Reviewed compliance download with the patient.  Compliance AHI and leak decent.  Continue auto CPAP 8 to 20 cm  We will try DreamWear full facemask since it covers be nose with the face and does not go on the bridge of the nose.  Sleep hygiene measures  Treatment of underlying comorbidities  Patient already quit smoking  Has recovered from COVID-19 and doing well  Follow-up in 1 year  Jana Jones MD  02/01/21  11:40 EST

## 2022-01-31 ENCOUNTER — OFFICE VISIT (OUTPATIENT)
Dept: SLEEP MEDICINE | Facility: HOSPITAL | Age: 87
End: 2022-01-31

## 2022-01-31 VITALS
SYSTOLIC BLOOD PRESSURE: 154 MMHG | WEIGHT: 160 LBS | OXYGEN SATURATION: 99 % | BODY MASS INDEX: 24.25 KG/M2 | HEART RATE: 57 BPM | DIASTOLIC BLOOD PRESSURE: 73 MMHG | HEIGHT: 68 IN

## 2022-01-31 DIAGNOSIS — G47.33 OSA ON CPAP: Primary | ICD-10-CM

## 2022-01-31 DIAGNOSIS — Z99.89 OSA ON CPAP: Primary | ICD-10-CM

## 2022-01-31 PROCEDURE — G0463 HOSPITAL OUTPT CLINIC VISIT: HCPCS

## 2022-01-31 NOTE — PROGRESS NOTES
"[facility] PULMONARY CARE         Dr Jana Jones  [unfilled]  Patient Care Team:  Tej Cedillo Jr., MD as PCP - General  Tej Cedillo Jr., MD as PCP - Family Medicine  Tej Cedillo Jr., MD as Referring Physician (Internal Medicine)  Rasheeda Novoa MD as Consulting Physician (Hematology and Oncology)    Chief Complaint:Overall apnea index of 18.9 events per hour  RDI 24 events per hour  Apnea index left side 16.4 events per hour  Apnea index on the right side 19.3 events per hour  Apnea index during REM sleep 13 events per hour  Recommend treatment pressures of 8 cm with heated humidity and ramp    Interval History: Patient here for annual compliance visit.  Currently on auto CPAP 8 to 20 cm.  Compliance 97% average daily use 7 hours 7 minutes.  AHI and leak look excellent.  Goes to bed 11 PM gets a bed gets about 8 hours of sleep and feels rested.  Currently having some issues with runny nose all the time.  No tobacco no alcohol no caffeine abuse.  Currently has a full facemask that fits well.  Supplies been adequate.    REVIEW OF SYSTEMS:   CARDIOVASCULAR: No chest pain, chest pressure or chest discomfort. No palpitations or edema.   RESPIRATORY: No shortness of breath, cough or sputum.   GASTROINTESTINAL: No anorexia, nausea, vomiting or diarrhea. No abdominal pain or blood.   HEMATOLOGIC: No bleeding or bruising.  Depue 1 out of 24 the normal limits    Ventilator/Non-Invasive Ventilation Settings (From admission, onward)            None            Vital Signs  Heart Rate:  [57] 57  BP: (154)/(73) 154/73  [unfilled]  Flowsheet Rows      First Filed Value   Admission Height 172.7 cm (67.99\") Documented at 01/31/2022 1100   Admission Weight 72.6 kg (160 lb) Documented at 01/31/2022 1100          Physical Exam:  Patient is examined using the personal protective equipment as per guidelines from infection control for this particular patient as enacted.  Hand hygiene was performed before " and after patient interaction.   General Appearance:    Alert, cooperative, in no acute distress.  Following simple commands  Neck midline trachea, no thyromegaly   Lungs:     Clear to auscultation, respirations regular, even and                  unlabored  ENT Mallampati between 3 and 4 no nasal congestion    Heart:    Regular rhythm and normal rate, normal S1 and S2, no            murmur, no gallop, no rub, no click   Chest Wall:    No abnormalities observed   Abdomen:     Normal bowel sounds, no masses, no organomegaly, soft        nontender, nondistended, no guarding, no rebound                tenderness   Extremities:   Moves all extremities well, no edema, no cyanosis, no             redness  CNS no focal neurological deficits normal sensory exam  Skin no rashes no nodules  Musculoskeletal no cyanosis no clubbing normal range of motion     Results Review:                                          I reviewed the patient's new clinical results.  I personally viewed and interpreted the patient's chest x-ray.        Medication Review:       No current facility-administered medications for this visit.      ASSESSMENT:   CRISTAL on CPAP  History of Covid infection  Allergic rhinitis  Rheumatoid arthritis  Seizure disorder  History of tobacco abuse    PLAN:  Reviewed compliance download with the patient  Excellent compliance AHI and leak  Continue current auto CPAP 8 to 20 cm  Patient having issues with some allergies will recommend over-the-counter nasal steroids Flonase or Nasonex  Sleep hygiene measures to continue  Patient benefiting from CPAP  Supplies be renewed  I will see him back in 1 year      Jana Jones MD  01/31/22  11:24 EST

## 2022-11-11 ENCOUNTER — TRANSCRIBE ORDERS (OUTPATIENT)
Dept: ADMINISTRATIVE | Facility: HOSPITAL | Age: 87
End: 2022-11-11

## 2022-11-11 DIAGNOSIS — I49.1 PREMATURE ATRIAL COMPLEX: Primary | ICD-10-CM

## 2022-11-22 ENCOUNTER — HOSPITAL ENCOUNTER (OUTPATIENT)
Dept: CARDIOLOGY | Facility: HOSPITAL | Age: 87
Discharge: HOME | End: 2022-11-22
Admitting: INTERNAL MEDICINE

## 2022-11-22 DIAGNOSIS — I49.1 PREMATURE ATRIAL COMPLEX: ICD-10-CM

## 2022-11-22 PROCEDURE — 93226 XTRNL ECG REC<48 HR SCAN A/R: CPT

## 2022-11-22 PROCEDURE — 93225 XTRNL ECG REC<48 HRS REC: CPT

## 2022-11-25 LAB
MAXIMAL PREDICTED HEART RATE: 136 BPM
STRESS TARGET HR: 116 BPM

## 2022-11-25 PROCEDURE — 93227 XTRNL ECG REC<48 HR R&I: CPT | Performed by: INTERNAL MEDICINE

## 2022-12-19 ENCOUNTER — OFFICE VISIT (OUTPATIENT)
Dept: CARDIOLOGY | Facility: CLINIC | Age: 87
End: 2022-12-19

## 2022-12-19 ENCOUNTER — LAB (OUTPATIENT)
Dept: LAB | Facility: HOSPITAL | Age: 87
End: 2022-12-19

## 2022-12-19 VITALS
DIASTOLIC BLOOD PRESSURE: 70 MMHG | BODY MASS INDEX: 31.41 KG/M2 | OXYGEN SATURATION: 97 % | HEART RATE: 60 BPM | SYSTOLIC BLOOD PRESSURE: 170 MMHG | HEIGHT: 60 IN | WEIGHT: 160 LBS

## 2022-12-19 DIAGNOSIS — I49.3 PVC (PREMATURE VENTRICULAR CONTRACTION): Primary | ICD-10-CM

## 2022-12-19 DIAGNOSIS — R06.09 DYSPNEA ON EXERTION: ICD-10-CM

## 2022-12-19 DIAGNOSIS — I10 HYPERTENSION, UNSPECIFIED TYPE: ICD-10-CM

## 2022-12-19 DIAGNOSIS — R00.2 PALPITATIONS: ICD-10-CM

## 2022-12-19 DIAGNOSIS — D63.8 ANEMIA, CHRONIC DISEASE: ICD-10-CM

## 2022-12-19 DIAGNOSIS — M06.9 RHEUMATOID ARTHRITIS, INVOLVING UNSPECIFIED SITE, UNSPECIFIED WHETHER RHEUMATOID FACTOR PRESENT: ICD-10-CM

## 2022-12-19 LAB
ALBUMIN SERPL-MCNC: 4.1 G/DL (ref 3.5–5.2)
ALBUMIN/GLOB SERPL: 2.7 G/DL
ALP SERPL-CCNC: 74 U/L (ref 39–117)
ALT SERPL W P-5'-P-CCNC: 16 U/L (ref 1–41)
ANION GAP SERPL CALCULATED.3IONS-SCNC: 9.2 MMOL/L (ref 5–15)
AST SERPL-CCNC: 25 U/L (ref 1–40)
BASOPHILS # BLD AUTO: 0.05 10*3/MM3 (ref 0–0.2)
BASOPHILS NFR BLD AUTO: 0.9 % (ref 0–1.5)
BILIRUB SERPL-MCNC: 0.3 MG/DL (ref 0–1.2)
BUN SERPL-MCNC: 17 MG/DL (ref 8–23)
BUN/CREAT SERPL: 16.5 (ref 7–25)
CALCIUM SPEC-SCNC: 9.3 MG/DL (ref 8.6–10.5)
CHLORIDE SERPL-SCNC: 109 MMOL/L (ref 98–107)
CO2 SERPL-SCNC: 24.8 MMOL/L (ref 22–29)
CREAT SERPL-MCNC: 1.03 MG/DL (ref 0.76–1.27)
DEPRECATED RDW RBC AUTO: 48.2 FL (ref 37–54)
EGFRCR SERPLBLD CKD-EPI 2021: 71.2 ML/MIN/1.73
EOSINOPHIL # BLD AUTO: 0.18 10*3/MM3 (ref 0–0.4)
EOSINOPHIL NFR BLD AUTO: 3.1 % (ref 0.3–6.2)
ERYTHROCYTE [DISTWIDTH] IN BLOOD BY AUTOMATED COUNT: 13.2 % (ref 12.3–15.4)
GLOBULIN UR ELPH-MCNC: 1.5 GM/DL
GLUCOSE SERPL-MCNC: 85 MG/DL (ref 65–99)
HCT VFR BLD AUTO: 35.7 % (ref 37.5–51)
HGB BLD-MCNC: 11.5 G/DL (ref 13–17.7)
IMM GRANULOCYTES # BLD AUTO: 0.01 10*3/MM3 (ref 0–0.05)
IMM GRANULOCYTES NFR BLD AUTO: 0.2 % (ref 0–0.5)
LYMPHOCYTES # BLD AUTO: 1.3 10*3/MM3 (ref 0.7–3.1)
LYMPHOCYTES NFR BLD AUTO: 22.1 % (ref 19.6–45.3)
MCH RBC QN AUTO: 31.9 PG (ref 26.6–33)
MCHC RBC AUTO-ENTMCNC: 32.2 G/DL (ref 31.5–35.7)
MCV RBC AUTO: 99.2 FL (ref 79–97)
MONOCYTES # BLD AUTO: 0.74 10*3/MM3 (ref 0.1–0.9)
MONOCYTES NFR BLD AUTO: 12.6 % (ref 5–12)
NEUTROPHILS NFR BLD AUTO: 3.59 10*3/MM3 (ref 1.7–7)
NEUTROPHILS NFR BLD AUTO: 61.1 % (ref 42.7–76)
NRBC BLD AUTO-RTO: 0 /100 WBC (ref 0–0.2)
PLATELET # BLD AUTO: 138 10*3/MM3 (ref 140–450)
PMV BLD AUTO: 11.7 FL (ref 6–12)
POTASSIUM SERPL-SCNC: 4.7 MMOL/L (ref 3.5–5.2)
PROT SERPL-MCNC: 5.6 G/DL (ref 6–8.5)
RBC # BLD AUTO: 3.6 10*6/MM3 (ref 4.14–5.8)
SODIUM SERPL-SCNC: 143 MMOL/L (ref 136–145)
T4 FREE SERPL-MCNC: 1.1 NG/DL (ref 0.93–1.7)
TSH SERPL DL<=0.05 MIU/L-ACNC: 9.42 UIU/ML (ref 0.27–4.2)
WBC NRBC COR # BLD: 5.87 10*3/MM3 (ref 3.4–10.8)

## 2022-12-19 PROCEDURE — 80053 COMPREHEN METABOLIC PANEL: CPT

## 2022-12-19 PROCEDURE — 93000 ELECTROCARDIOGRAM COMPLETE: CPT | Performed by: STUDENT IN AN ORGANIZED HEALTH CARE EDUCATION/TRAINING PROGRAM

## 2022-12-19 PROCEDURE — 36415 COLL VENOUS BLD VENIPUNCTURE: CPT

## 2022-12-19 PROCEDURE — 85025 COMPLETE CBC W/AUTO DIFF WBC: CPT

## 2022-12-19 PROCEDURE — 84439 ASSAY OF FREE THYROXINE: CPT

## 2022-12-19 PROCEDURE — 99204 OFFICE O/P NEW MOD 45 MIN: CPT | Performed by: STUDENT IN AN ORGANIZED HEALTH CARE EDUCATION/TRAINING PROGRAM

## 2022-12-19 PROCEDURE — 84443 ASSAY THYROID STIM HORMONE: CPT

## 2022-12-19 RX ORDER — ANTIOX #8/OM3/DHA/EPA/LUT/ZEAX 250-2.5 MG
CAPSULE ORAL
COMMUNITY

## 2022-12-19 RX ORDER — MEMANTINE HYDROCHLORIDE 10 MG/1
1 TABLET ORAL DAILY
COMMUNITY

## 2022-12-19 RX ORDER — LEFLUNOMIDE 20 MG/1
1 TABLET ORAL DAILY
COMMUNITY
Start: 2022-10-05

## 2022-12-19 NOTE — PROGRESS NOTES
Subjective:     Encounter Date:12/19/22      Patient ID: Juan C Ca is a 87 y.o. male.    Chief Complaint:   Chief Complaint   Patient presents with   • Hypertension   • Hyperlipidemia   • Palpitations   • Shortness of Breath   • Follow-up      History of Present Illness    Mr. Ca is an 87-year-old gentleman past medical history of rheumatoid arthritis, remote TBI with mild cognitive impairment, subsequent seizure disorder who presents for further evaluation of dizziness in the setting of a Holter with large PVC burden.    Patient presents today with his daughter who corroborates history.  States that over the past several years he had intermittent dizzy spells been worked up by neurology.  He states that sensation of lightheadedness that occurs sporadically, sometimes exertional, and sometimes it changes with position.  He denies overt vertigo-like sensations.  He has had a rather extensive neurology work-up which has been unrevealing.    He denies any overt chest pains but does have some dyspnea on exertion when he exerts himself.  Due to the symptoms of dizziness and occasional lightheadedness, he underwent a Holter monitor which revealed a 26% PVC burden.  His resting heart was 62.  This was on metoprolol 12.5 twice daily and he presents today for further evaluation    The following portions of the patient's history were reviewed and updated as appropriate: allergies, current medications, past family history, past medical history, past social history, past surgical history and problem list.    Past Medical History:   Diagnosis Date   • Cancer (HCC)     skin cancer   • CVD (cerebrovascular disease)     with remote infarcts per CT of head.   • H/O Iron deficiency anemia    • H/O Traumatic brain injury (CMS/HCC)    • H/O: pneumonia 04/2015   • Hyperlipidemia    • Hypertension    • CRISTAL (obstructive sleep apnea)    • Premature atrial contraction    • Rheumatoid arthritis (HCC)    • Seizures (HCC)      "Started in 1960s.       Past Surgical History:   Procedure Laterality Date   • APPENDECTOMY  1950   • CATARACT EXTRACTION     • COLONOSCOPY N/A 12/12/2017    Procedure: COLONOSCOPY TO CECUM AND TI WITH APC CAUTERY OF RIGHT COLON AVM ;  Surgeon: Lucio Stein MD;  Location: [facility] ENDOSCOPY;  Service:    • ENDOSCOPY N/A 12/12/2017    Procedure: ESOPHAGOGASTRODUODENOSCOPY with BX ;  Surgeon: Lucio Stein MD;  Location: [facility] ENDOSCOPY;  Service:            ECG 12 Lead    Date/Time: 12/19/2022 10:35 AM  Performed by: Som Garduno MD  Authorized by: Som Garduno MD   Comparison: not compared with previous ECG   Previous ECG: no previous ECG available  Rhythm: sinus rhythm  Rate: normal  Conduction: conduction normal  ST Segments: ST segments normal  T Waves: T waves normal  QRS axis: left  Other findings: left ventricular hypertrophy and early transition    Clinical impression: abnormal EKG               Objective:     Vitals:    12/19/22 0845 12/19/22 0900   BP: 180/70 170/70   BP Location: Left arm Right arm   Patient Position: Sitting Sitting   Pulse: 60    SpO2: 97%    Weight: 72.6 kg (160 lb)    Height: 68 cm (26.77\")          Constitutional:       Appearance: Healthy appearance. Not in distress.   Neck:      Vascular: JVD normal.   Pulmonary:      Effort: Pulmonary effort is normal.      Breath sounds: Normal breath sounds.   Cardiovascular:      PMI at left midclavicular line. Normal rate. Regular rhythm. Normal S2.      Murmurs: There is no murmur.      Comments: No murmurs appreciated.  Pulses:     Intact distal pulses.   Edema:     Peripheral edema absent.   Musculoskeletal:      Comments: Gait unsteadiness noted. Skin:     General: Skin is warm and dry.   Neurological:      General: No focal deficit present.      Mental Status: Alert, oriented to person, place, and time and oriented to person, place and time.   Psychiatric:         Mood and Affect: Mood and affect normal.         Lab Review: "       BUN   Date Value Ref Range Status   01/06/2021 18 8 - 23 mg/dL Final     Creatinine   Date Value Ref Range Status   01/06/2021 0.60 (L) 0.76 - 1.27 mg/dL Final     Potassium   Date Value Ref Range Status   01/06/2021 3.9 3.5 - 5.2 mmol/L Final     ALT (SGPT)   Date Value Ref Range Status   01/06/2021 57 (H) 1 - 41 U/L Final     AST (SGOT)   Date Value Ref Range Status   01/06/2021 65 (H) 1 - 40 U/L Final     Labs reviewed from primary care doctor's office on November 12, 2022:  Hemoglobin 11  Platelets 115  Ferritin mildly elevated 485  He underwent a Holter on November 23, 2022:  He wore the Holter for 1 day.  He was noted to have 26.4% PVC burden.  His average heart rate was 62 with a minimum of 47.    Performed        Assessment:          Diagnosis Plan   1. PVC (premature ventricular contraction)        2. Dyspnea on exertion  Adult Stress Echo W/ Cont or Stress Agent if Necessary Per Protocol    TSH Rfx On Abnormal To Free T4    Comprehensive Metabolic Panel    CBC & Differential      3. Palpitations  TSH Rfx On Abnormal To Free T4    Comprehensive Metabolic Panel    CBC & Differential      4. Hypertension, unspecified type        5. Anemia, chronic disease        6. Rheumatoid arthritis, involving unspecified site, unspecified whether rheumatoid factor present (HCC)               Plan:           1. Dizziness  2. Premature ventricular contractions  - He does have a complex neurologic history with seizures and prior TBI, which likely complicates the history.  His history is somewhat limited due to difficulty expressing himself, however his daughter has noted that he has occasional spells where he will feel lightheaded that happens at rest.  It does not sound like vertigo  - His Holter was markedly abnormal with 26% PVCs.  He does have some dyspnea on exertion.  - I think that a dobutamine stress echo would be helpful in delineating the PVCs to see if they are ischemic or not.  He is not really steady  enough on his feet for a treadmill to be safe.   -I cannot say with certainty whether his symptoms are related to PVCs or not, however I feel this test is rather benign and with the results of this test we can help delineate if there are certain medical therapy options that might be helpful to reduce the PVCs depending on their etiology.  If the symptoms are PVC related, you could argue that quality of life can be improved if these could be suppressed.  May likely refer to cardiac EP pending results as his heart rate limits further increase of his beta-blocker therapy.  -We will recheck electrolytes and TSH today    3. Seizure disorder  4. History of TBI  - Has had a pretty extensive neurologic work-up for the dizziness including MRIs, without a clear result    5.  Mild anemia of chronic disease  -Likely related to his underlying rheumatoid arthritis.  Hemoglobin 11 is unlikely to be significant to cause dizziness and lightheadedness.    Thank you very much for allowing us to participate in the care of this pleasant patient.  Please don't hesitate to call if I can be of assistance in any way.      RTC 2 months for symptom check-in.  Dobutamine stress echo in interim.      Current Outpatient Medications:   •  acetaminophen (TYLENOL) 500 MG tablet, Take 2 tablets by mouth Every 6 (Six) Hours As Needed for Mild Pain  or Fever., Disp:  , Rfl:   •  aspirin 81 MG oral suspension, , Disp: , Rfl:   •  cetirizine (zyrTEC) 10 MG tablet, Take 0.5 tablets by mouth Daily., Disp: , Rfl:   •  cyanocobalamin (VITAMIN B-12) 1000 MCG tablet, , Disp: , Rfl:   •  docusate sodium 100 MG capsule, Take 100 mg by mouth 2 (Two) Times a Day., Disp: , Rfl:   •  famotidine (PEPCID) 20 MG tablet, Take 1 tablet by mouth Daily., Disp: 30 tablet, Rfl: 3  •  hydroxychloroquine (PLAQUENIL) 200 MG tablet, Take 200 mg by mouth 2 (Two) Times a Day., Disp: , Rfl:   •  leflunomide (ARAVA) 20 MG tablet, Take 1 tablet by mouth Daily., Disp: , Rfl:   •   levETIRAcetam (KEPPRA) 500 MG tablet, Take 500 mg by mouth 2 (Two) Times a Day., Disp: , Rfl:   •  memantine (NAMENDA) 10 MG tablet, Take 1 tablet by mouth Daily., Disp: , Rfl:   •  metoprolol tartrate (Lopressor) 50 MG tablet, Take 0.5 tablets by mouth 2 (Two) Times a Day., Disp: 60 tablet, Rfl: 3  •  Mirabegron ER (MYRBETRIQ) 50 MG tablet sustained-release 24 hour 24 hr tablet, Take 1 tablet by mouth., Disp: , Rfl:   •  Multiple Vitamin (MULTI VITAMIN MENS PO), Take 1 tablet by mouth Daily., Disp: , Rfl:   •  multivitamins-minerals (PRESERVISION AREDS 2) capsule capsule, , Disp: , Rfl:   •  tamsulosin (FLOMAX) 0.4 MG capsule 24 hr capsule, Take 1 capsule by mouth every night., Disp: 30 capsule, Rfl: 0  •  vitamin D3 125 MCG (5000 UT) capsule capsule, , Disp: , Rfl:   •  guaiFENesin (ROBITUSSIN) 100 MG/5ML syrup, Take 200 mg by mouth At Night As Needed for Cough., Disp: , Rfl:   •  Homeopathic Products (ZICAM COLD REMEDY PO), Take 1 capsule by mouth Daily., Disp: , Rfl:   •  ipratropium (ATROVENT) 0.06 % nasal spray, 2 sprays into the nostril(s) as directed by provider 4 (Four) Times a Day for 7 days., Disp: 15 mL, Rfl: 0  •  loperamide (IMODIUM) 2 MG capsule, Take 1 capsule by mouth 2 (Two) Times a Day As Needed for Diarrhea. OTC, Disp:  , Rfl:   •  methylPREDNISolone (MEDROL) 4 MG dose pack, Take as directed on package instructions., Disp: 21 tablet, Rfl: 0  •  mupirocin (BACTROBAN) 2 % ointment, Apply 1 application topically to the appropriate area as directed 2 (Two) Times a Day. To graft sites, Disp: , Rfl:   •  NON FORMULARY, Apply 1 dose topically to the appropriate area as directed 2 (two) times a day. Water and vinegar to 2 skin graft sites, Disp: , Rfl:   •  ondansetron (Zofran) 4 MG tablet, Take 1 tablet by mouth Every 8 (Eight) Hours As Needed for Nausea or Vomiting., Disp: 15 tablet, Rfl: 0         Return in about 2 months (around 2/19/2023).      **Harlan Disclaimer:   Much of this encounter note is  an electronic transcription/translation of spoken language to printed text. The electronic translation of spoken language may permit erroneous, or at times, nonsensical words or phrases to be inadvertently transcribed. Although I have reviewed the note for such errors, some may still exist.

## 2022-12-19 NOTE — PROGRESS NOTES
Can we please call patient and let him know that so far the blood tests are unremarkable.  The thyroid function test is a little high, but overall the thyroid hormone levels are normal.  This should not be causing his symptoms.  We will call him to schedule the stress ultrasound to see what is causing his irregular heartbeats.    Thank you

## 2022-12-29 ENCOUNTER — HOSPITAL ENCOUNTER (OUTPATIENT)
Dept: CARDIOLOGY | Facility: HOSPITAL | Age: 87
Discharge: HOME | End: 2022-12-29
Payer: MEDICARE

## 2023-01-06 ENCOUNTER — HOSPITAL ENCOUNTER (OUTPATIENT)
Dept: CARDIOLOGY | Facility: HOSPITAL | Age: 88
Discharge: HOME | End: 2023-01-06
Payer: MEDICARE

## 2023-01-06 VITALS
DIASTOLIC BLOOD PRESSURE: 86 MMHG | SYSTOLIC BLOOD PRESSURE: 154 MMHG | OXYGEN SATURATION: 100 % | HEIGHT: 68 IN | HEART RATE: 68 BPM | WEIGHT: 157 LBS | BODY MASS INDEX: 23.79 KG/M2

## 2023-01-06 VITALS — BODY MASS INDEX: 24.06 KG/M2 | WEIGHT: 158.73 LBS | HEIGHT: 68 IN

## 2023-01-06 DIAGNOSIS — I49.3 FREQUENT PVCS: ICD-10-CM

## 2023-01-06 DIAGNOSIS — I49.3 PREMATURE VENTRICULAR CONTRACTION: Primary | ICD-10-CM

## 2023-01-06 DIAGNOSIS — R07.2 PRECORDIAL PAIN: ICD-10-CM

## 2023-01-06 DIAGNOSIS — R06.09 DYSPNEA ON EXERTION: ICD-10-CM

## 2023-01-06 DIAGNOSIS — R07.2 PRECORDIAL PAIN: Primary | ICD-10-CM

## 2023-01-06 LAB
AORTIC ARCH: 3 CM
ASCENDING AORTA: 3.2 CM
BH CV ECHO MEAS - ACS: 1.74 CM
BH CV ECHO MEAS - AO MAX PG: 9.1 MMHG
BH CV ECHO MEAS - AO MEAN PG: 5.1 MMHG
BH CV ECHO MEAS - AO ROOT DIAM: 2.9 CM
BH CV ECHO MEAS - AO V2 MAX: 150.8 CM/SEC
BH CV ECHO MEAS - AO V2 VTI: 34.4 CM
BH CV ECHO MEAS - AVA(I,D): 1.89 CM2
BH CV ECHO MEAS - EDV(CUBED): 48.9 ML
BH CV ECHO MEAS - EDV(MOD-SP2): 78 ML
BH CV ECHO MEAS - EDV(MOD-SP4): 91 ML
BH CV ECHO MEAS - EF(MOD-SP2): 69.2 %
BH CV ECHO MEAS - EF(MOD-SP4): 64.8 %
BH CV ECHO MEAS - ESV(CUBED): 18.7 ML
BH CV ECHO MEAS - ESV(MOD-SP2): 24 ML
BH CV ECHO MEAS - ESV(MOD-SP4): 32 ML
BH CV ECHO MEAS - FS: 27.4 %
BH CV ECHO MEAS - IVS/LVPW: 0.97 CM
BH CV ECHO MEAS - IVSD: 1.08 CM
BH CV ECHO MEAS - LAT PEAK E' VEL: 9.6 CM/SEC
BH CV ECHO MEAS - LV MASS(C)D: 126.8 GRAMS
BH CV ECHO MEAS - LV MAX PG: 2.9 MMHG
BH CV ECHO MEAS - LV MEAN PG: 1.71 MMHG
BH CV ECHO MEAS - LV V1 MAX: 84.8 CM/SEC
BH CV ECHO MEAS - LV V1 VTI: 20.5 CM
BH CV ECHO MEAS - LVIDD: 3.7 CM
BH CV ECHO MEAS - LVIDS: 2.7 CM
BH CV ECHO MEAS - LVOT AREA: 3.2 CM2
BH CV ECHO MEAS - LVOT DIAM: 2.01 CM
BH CV ECHO MEAS - LVPWD: 1.12 CM
BH CV ECHO MEAS - MED PEAK E' VEL: 7.8 CM/SEC
BH CV ECHO MEAS - MV A DUR: 0.12 SEC
BH CV ECHO MEAS - MV A MAX VEL: 70.6 CM/SEC
BH CV ECHO MEAS - MV DEC SLOPE: 568 CM/SEC2
BH CV ECHO MEAS - MV DEC TIME: 0.16 MSEC
BH CV ECHO MEAS - MV E MAX VEL: 81 CM/SEC
BH CV ECHO MEAS - MV E/A: 1.15
BH CV ECHO MEAS - MV MAX PG: 4.3 MMHG
BH CV ECHO MEAS - MV MEAN PG: 1.83 MMHG
BH CV ECHO MEAS - MV P1/2T: 50.5 MSEC
BH CV ECHO MEAS - MV V2 VTI: 33.9 CM
BH CV ECHO MEAS - MVA(P1/2T): 4.4 CM2
BH CV ECHO MEAS - MVA(VTI): 1.92 CM2
BH CV ECHO MEAS - PA ACC TIME: 0.11 SEC
BH CV ECHO MEAS - PA PR(ACCEL): 28.3 MMHG
BH CV ECHO MEAS - PA V2 MAX: 118.1 CM/SEC
BH CV ECHO MEAS - PULM A REVS DUR: 0.1 SEC
BH CV ECHO MEAS - PULM A REVS VEL: 22.4 CM/SEC
BH CV ECHO MEAS - PULM DIAS VEL: 41.4 CM/SEC
BH CV ECHO MEAS - PULM S/D: 1.16
BH CV ECHO MEAS - PULM SYS VEL: 48.1 CM/SEC
BH CV ECHO MEAS - QP/QS: 0.95
BH CV ECHO MEAS - RV MAX PG: 2.7 MMHG
BH CV ECHO MEAS - RV V1 MAX: 82.7 CM/SEC
BH CV ECHO MEAS - RV V1 VTI: 18.6 CM
BH CV ECHO MEAS - RVOT DIAM: 2.06 CM
BH CV ECHO MEAS - SUP REN AO DIAM: 1.7 CM
BH CV ECHO MEAS - SV(LVOT): 65.2 ML
BH CV ECHO MEAS - SV(MOD-SP2): 54 ML
BH CV ECHO MEAS - SV(MOD-SP4): 59 ML
BH CV ECHO MEAS - SV(RVOT): 61.8 ML
BH CV ECHO MEAS - TAPSE (>1.6): 2.03 CM
BH CV ECHO MEAS - TR MAX PG: 37 MMHG
BH CV ECHO MEAS - TR MAX VEL: 304.1 CM/SEC
BH CV ECHO MEASUREMENTS AVERAGE E/E' RATIO: 9.31
BH CV NUCLEAR PRIOR STUDY: 2
BH CV REST NUCLEAR ISOTOPE DOSE: 30 MCI
BH CV STRESS BP STAGE 1: NORMAL
BH CV STRESS COMMENTS STAGE 1: NORMAL
BH CV STRESS DOSE REGADENOSON STAGE 1: 0.4
BH CV STRESS DURATION MIN STAGE 1: 0
BH CV STRESS DURATION SEC STAGE 1: 10
BH CV STRESS HR STAGE 1: 82
BH CV STRESS NUCLEAR ISOTOPE DOSE: 30 MCI
BH CV STRESS PROTOCOL 1: NORMAL
BH CV STRESS RECOVERY BP: NORMAL MMHG
BH CV STRESS RECOVERY HR: 79 BPM
BH CV STRESS STAGE 1: 1
BH CV XLRA - RV BASE: 2.6 CM
BH CV XLRA - RV LENGTH: 6.5 CM
BH CV XLRA - RV MID: 2.9 CM
BH CV XLRA - TDI S': 10.4 CM/SEC
LEFT ATRIUM VOLUME INDEX: 23.2 ML/M2
LV EF NUC BP: 70 %
MAXIMAL PREDICTED HEART RATE: 135 BPM
MAXIMAL PREDICTED HEART RATE: 135 BPM
PERCENT MAX PREDICTED HR: 60.74 %
SINUS: 2.9 CM
STJ: 3.2 CM
STRESS BASELINE BP: NORMAL MMHG
STRESS BASELINE HR: 72 BPM
STRESS PERCENT HR: 71 %
STRESS POST EXERCISE DUR SEC: 10 SEC
STRESS POST PEAK BP: NORMAL MMHG
STRESS POST PEAK HR: 82 BPM
STRESS TARGET HR: 115 BPM
STRESS TARGET HR: 115 BPM

## 2023-01-06 PROCEDURE — 25010000002 REGADENOSON 0.4 MG/5ML SOLUTION: Performed by: STUDENT IN AN ORGANIZED HEALTH CARE EDUCATION/TRAINING PROGRAM

## 2023-01-06 PROCEDURE — 93017 CV STRESS TEST TRACING ONLY: CPT

## 2023-01-06 PROCEDURE — 25010000002 PERFLUTREN (DEFINITY) 8.476 MG IN SODIUM CHLORIDE (PF) 0.9 % 10 ML INJECTION: Performed by: STUDENT IN AN ORGANIZED HEALTH CARE EDUCATION/TRAINING PROGRAM

## 2023-01-06 PROCEDURE — 93306 TTE W/DOPPLER COMPLETE: CPT | Performed by: INTERNAL MEDICINE

## 2023-01-06 PROCEDURE — A9555 RB82 RUBIDIUM: HCPCS | Performed by: STUDENT IN AN ORGANIZED HEALTH CARE EDUCATION/TRAINING PROGRAM

## 2023-01-06 PROCEDURE — 93016 CV STRESS TEST SUPVJ ONLY: CPT | Performed by: INTERNAL MEDICINE

## 2023-01-06 PROCEDURE — 78492 MYOCRD IMG PET MLT RST&STRS: CPT | Performed by: INTERNAL MEDICINE

## 2023-01-06 PROCEDURE — 0 RUBIDIUM CHLORIDE: Performed by: STUDENT IN AN ORGANIZED HEALTH CARE EDUCATION/TRAINING PROGRAM

## 2023-01-06 PROCEDURE — 93018 CV STRESS TEST I&R ONLY: CPT | Performed by: INTERNAL MEDICINE

## 2023-01-06 PROCEDURE — 93306 TTE W/DOPPLER COMPLETE: CPT

## 2023-01-06 PROCEDURE — 78492 MYOCRD IMG PET MLT RST&STRS: CPT

## 2023-01-06 RX ADMIN — PERFLUTREN 1.5 ML: 6.52 INJECTION, SUSPENSION INTRAVENOUS at 13:29

## 2023-01-06 RX ADMIN — REGADENOSON 0.4 MG: 0.08 INJECTION, SOLUTION INTRAVENOUS at 13:49

## 2023-01-07 NOTE — PROGRESS NOTES
Can we please call patient and let him know that his stress test was normal.  There were no evidence of heart blockages that could be causing PVCs or shortness of breath.  His heart ultrasound was also mostly normal.  The heart muscle was working well and there were no significant valve problems.  There was a very mild elevation in the lung pressures, but not enough to explain dizzy spells or lightheadedness.    I think with this information, I am going to refer him to the cardiac EP team.  This can help us suppress the premature ventricular beats, as it seems that these symptoms may be coming from the PVCs.    Thank you

## 2023-01-10 ENCOUNTER — TELEPHONE (OUTPATIENT)
Dept: CARDIOLOGY | Facility: CLINIC | Age: 88
End: 2023-01-10
Payer: MEDICARE

## 2023-01-10 NOTE — TELEPHONE ENCOUNTER
Reviewed recommendations with Hannah.  Moved f/u appointment with Dr. Garduno to 5/9.  Hannah stated she will call patient with appointment change.    Thank you,  Virginie Dior RN  Triage Nurse G

## 2023-01-10 NOTE — TELEPHONE ENCOUNTER
he does not need to see me on the 28th.  Patient can follow-up with EP on the 21st, and then see me 2 months afterward.

## 2023-01-10 NOTE — TELEPHONE ENCOUNTER
----- Message from Som Garduno MD sent at 1/6/2023  7:26 PM EST -----  Can we please call patient and let him know that his stress test was normal.  There were no evidence of heart blockages that could be causing PVCs or shortness of breath.  His heart ultrasound was also mostly normal.  The heart muscle was working well and there were no significant valve problems.  There was a very mild elevation in the lung pressures, but not enough to explain dizzy spells or lightheadedness.    I think with this information, I am going to refer him to the cardiac EP team.  This can help us suppress the premature ventricular beats, as it seems that these symptoms may be coming from the PVCs.    Thank you

## 2023-01-10 NOTE — TELEPHONE ENCOUNTER
Reviewed results and recommendations with Juan C Ca and his daughter, Hannah.  Patient verbalized understanding of results and recommendations.      Hannah is asking if patient also needs to keep his appointment on 2/28 with Dr. Garduno?    Patient is scheduled to see EP on 2/21.    Thank you,  Virginie Dior RN  Triage Nurse [facility]

## 2023-01-30 ENCOUNTER — OFFICE VISIT (OUTPATIENT)
Dept: SLEEP MEDICINE | Facility: HOSPITAL | Age: 88
End: 2023-01-30
Payer: MEDICARE

## 2023-01-30 VITALS
HEIGHT: 68 IN | DIASTOLIC BLOOD PRESSURE: 92 MMHG | OXYGEN SATURATION: 98 % | WEIGHT: 158 LBS | HEART RATE: 60 BPM | BODY MASS INDEX: 23.95 KG/M2 | SYSTOLIC BLOOD PRESSURE: 175 MMHG

## 2023-01-30 DIAGNOSIS — Z99.89 OSA ON CPAP: Primary | ICD-10-CM

## 2023-01-30 DIAGNOSIS — G47.33 OSA ON CPAP: Primary | ICD-10-CM

## 2023-01-30 PROCEDURE — G0463 HOSPITAL OUTPT CLINIC VISIT: HCPCS

## 2023-01-30 NOTE — PROGRESS NOTES
"[facility] PULMONARY CARE         Dr Jana Jones  [unfilled]  Patient Care Team:  Tej Cedillo Jr., MD as PCP - General  Tej Cedillo Jr., MD as PCP - Family Medicine  Tej Cedillo Jr., MD as Referring Physician (Internal Medicine)  Rasheeda Novoa MD as Consulting Physician (Hematology and Oncology)    Chief Complaint:Overall apnea index of 18.9 events per hour  RDI 24 events per hour  Apnea index left side 16.4 events per hour  Apnea index on the right side 19.3 events per hour  Apnea index during REM sleep 13 events per hour  Recommend treatment pressures of 8 cm with heated humidity and ramp    Interval History: Patient of annual compliance visit.  Currently set up on auto CPAP 8 to 20 cm.  Compliance 83% average daily 7 hours 3 minutes.  AHI 6.9 events per hour and leak is high.  Feels rested with CPAP.  Goes to bed 9 PM gets up 6 gets about 8+ hours of sleep and more rested with the CPAP.  Reports runny nose constantly.  No tobacco no alcohol abuse.  Caffeine 4 cups daily.  Currently has a full facemask to which patient is comfortable with.    REVIEW OF SYSTEMS:   CARDIOVASCULAR: No chest pain, chest pressure or chest discomfort. No palpitations or edema.   RESPIRATORY: Shortness of breath  GASTROINTESTINAL: No anorexia, nausea, vomiting or diarrhea. No abdominal pain or blood.   HEMATOLOGIC: No bleeding or bruising.  [place] not completed  Positive for postnasal drainage shortness of breath.  Currently he is chest pain-free    Ventilator/Non-Invasive Ventilation Settings (From admission, onward)    None            Vital Signs  Heart Rate:  [60] 60  BP: (175)/(92) 175/92  [unfilled]  Flowsheet Rows    Flowsheet Row First Filed Value   Admission Height 172.7 cm (67.99\") Documented at 01/30/2023 1017   Admission Weight 71.7 kg (158 lb) Documented at 01/30/2023 1017          Physical Exam:  Patient is examined using the personal protective equipment as per guidelines from infection " control for this particular patient as enacted.  Hand hygiene was performed before and after patient interaction.   General Appearance:    Alert, cooperative, in no acute distress.  Following simple commands  ENT Mallampati between 3 and 4 no nasal congestion  Neck midline trachea, no thyromegaly   Lungs:     Clear to auscultation, respirations regular, even and                  unlabored    Heart:    Regular rhythm and normal rate, normal S1 and S2, no            murmur, no gallop, no rub, no click   Chest Wall:    No abnormalities observed   Abdomen:     Normal bowel sounds, no masses, no organomegaly, soft        nontender, nondistended, no guarding, no rebound                tenderness   Extremities:   Moves all extremities well, no edema, no cyanosis, no             redness  CNS no focal neurological deficits normal sensory exam  Skin no rashes no nodules  Musculoskeletal no cyanosis no clubbing normal range of motion     Results Review:                                          I reviewed the patient's new clinical results.  I personally viewed and interpreted the patient's chest x-ray.        Medication Review:       No current facility-administered medications for this visit.      ASSESSMENT:   CRISTAL on CPAP  History of Covid infection  Allergic rhinitis  Rheumatoid arthritis  Seizure disorder  History of tobacco abuse    PLAN:  Reviewed compliance download with the patient.  Compliance is excellent AHI and leak both high.  Patient will switch out with a new mask after supplies which is being ordered today.  Advised patient to secure mask properly  Sleep hygiene measures  Treatment of underlying comorbidities  Treatment of allergies with the addition of Flonase or Nasonex and continuing with Zyrtec.  If the leak is persistent then we may have to do a mask refitting.  Follow-up in 1 year                Jana Jones MD  01/30/23  10:32 EST

## 2023-02-21 ENCOUNTER — OFFICE VISIT (OUTPATIENT)
Dept: CARDIOLOGY | Facility: CLINIC | Age: 88
End: 2023-02-21
Payer: MEDICARE

## 2023-02-21 VITALS
SYSTOLIC BLOOD PRESSURE: 106 MMHG | BODY MASS INDEX: 23.16 KG/M2 | WEIGHT: 152.8 LBS | DIASTOLIC BLOOD PRESSURE: 74 MMHG | HEIGHT: 68 IN | HEART RATE: 58 BPM

## 2023-02-21 DIAGNOSIS — I49.3 PVC (PREMATURE VENTRICULAR CONTRACTION): Primary | ICD-10-CM

## 2023-02-21 PROCEDURE — 93000 ELECTROCARDIOGRAM COMPLETE: CPT | Performed by: INTERNAL MEDICINE

## 2023-02-21 PROCEDURE — 99214 OFFICE O/P EST MOD 30 MIN: CPT | Performed by: INTERNAL MEDICINE

## 2023-02-21 RX ORDER — LEVOTHYROXINE SODIUM 0.03 MG/1
1 TABLET ORAL DAILY
COMMUNITY
Start: 2023-02-15

## 2023-02-21 RX ORDER — ASPIRIN 81 MG/1
81 TABLET ORAL DAILY
COMMUNITY

## 2023-02-21 NOTE — PROGRESS NOTES
Date of Office Visit: 2023  Encounter Provider: Tristin Riley MD  Place of Service: [facility] CARDIOLOGY  Patient Name: Juan C Ca  :1935    Chief Complaint   Patient presents with   • PVCs     New Patient Consult   • Palpitations   • Hypertension          :     HPI: Juan C Ca is a 87 y.o. male who presents today for PVCs.      The patient has complained of some lightheadedness.     He has history of TBI and mild cognitive impairment, and I think he complained of this more to his family than medical providers.     He was noted to have some PVC on in office EKG and Holter monitor showed 25% burden of PVCs    This was done several weeks ago, and he really doesn't have any complaints today during my visit.      He is accompanied by his daughter.     He has been treated with beta blocker          Past Medical History:   Diagnosis Date   • Cancer (HCC)     skin cancer   • CVD (cerebrovascular disease)     with remote infarcts per CT of head.   • H/O Iron deficiency anemia    • H/O Traumatic brain injury (CMS/HCC)    • H/O: pneumonia 2015   • Hyperlipidemia    • Hypertension    • CRISTAL (obstructive sleep apnea)    • Premature atrial contraction    • Rheumatoid arthritis (HCC)    • Seizures (HCC)     Started in 1960s.       Past Surgical History:   Procedure Laterality Date   • APPENDECTOMY     • CATARACT EXTRACTION     • COLONOSCOPY N/A 2017    Procedure: COLONOSCOPY TO CECUM AND TI WITH APC CAUTERY OF RIGHT COLON AVM ;  Surgeon: Lucio Stein MD;  Location: [facility] ENDOSCOPY;  Service:    • ENDOSCOPY N/A 2017    Procedure: ESOPHAGOGASTRODUODENOSCOPY with BX ;  Surgeon: Lucio Stein MD;  Location: [facility] ENDOSCOPY;  Service:        Social History     Socioeconomic History   • Marital status:      Spouse name: Abbie   • Years of education: College   Tobacco Use   • Smoking status: Former     Years: 15.00     Types: Cigarettes     Quit  date: 1987     Years since quittin.2   • Smokeless tobacco: Never   • Tobacco comments:     Caffeine - coffee and tea    Vaping Use   • Vaping Use: Never used   Substance and Sexual Activity   • Alcohol use: No   • Drug use: No   • Sexual activity: Yes     Partners: Female     Birth control/protection: None       Family History   Problem Relation Age of Onset   • Colon cancer Other    • Hypertension Mother    • Mental illness Mother    • Hypertension Father    • Heart disease Sister    • Hypertension Sister    • Colon cancer Sister    • Skin cancer Sister    • Heart disease Brother    • Hypertension Brother    • Lung cancer Brother    • Diabetes Daughter    • Throat cancer Brother    • Cancer Brother         Bladder   • Prostate cancer Brother        Review of Systems   Constitutional: Negative.   Cardiovascular: Negative.    Respiratory: Negative.    Gastrointestinal: Negative.    Neurological: Positive for light-headedness.       Allergies   Allergen Reactions   • Sulfadiazine Unknown - High Severity     fever  Other reaction(s): Fever         Current Outpatient Medications:   •  acetaminophen (TYLENOL) 500 MG tablet, Take 2 tablets by mouth Every 6 (Six) Hours As Needed for Mild Pain  or Fever., Disp:  , Rfl:   •  aspirin (aspirin EC) 81 MG EC tablet, Take 81 mg by mouth Daily., Disp: , Rfl:   •  cetirizine (zyrTEC) 10 MG tablet, Take 0.5 tablets by mouth Daily., Disp: , Rfl:   •  famotidine (PEPCID) 20 MG tablet, Take 1 tablet by mouth Daily., Disp: 30 tablet, Rfl: 3  •  hydroxychloroquine (PLAQUENIL) 200 MG tablet, Take 200 mg by mouth 2 (Two) Times a Day., Disp: , Rfl:   •  leflunomide (ARAVA) 20 MG tablet, Take 1 tablet by mouth Daily., Disp: , Rfl:   •  levETIRAcetam (KEPPRA) 500 MG tablet, Take 500 mg by mouth 2 (Two) Times a Day., Disp: , Rfl:   •  levothyroxine (SYNTHROID, LEVOTHROID) 25 MCG tablet, Take 1 tablet by mouth Daily., Disp: , Rfl:   •  memantine (NAMENDA) 10 MG tablet, Take 1 tablet  "by mouth Daily., Disp: , Rfl:   •  metoprolol tartrate (Lopressor) 50 MG tablet, Take 0.5 tablets by mouth 2 (Two) Times a Day., Disp: 60 tablet, Rfl: 3  •  Mirabegron ER (MYRBETRIQ) 50 MG tablet sustained-release 24 hour 24 hr tablet, Take 1 tablet by mouth., Disp: , Rfl:   •  Multiple Vitamin (MULTI VITAMIN MENS PO), Take 1 tablet by mouth Daily., Disp: , Rfl:   •  multivitamins-minerals (PRESERVISION AREDS 2) capsule capsule, , Disp: , Rfl:   •  tamsulosin (FLOMAX) 0.4 MG capsule 24 hr capsule, Take 1 capsule by mouth every night., Disp: 30 capsule, Rfl: 0  •  vitamin D3 125 MCG (5000 UT) capsule capsule, , Disp: , Rfl:       Objective:     Vitals:    02/21/23 1153   BP: 106/74   Pulse: 58   Weight: 69.3 kg (152 lb 12.8 oz)   Height: 172.7 cm (68\")     Body mass index is 23.23 kg/m².    PHYSICAL EXAM:    Vitals and nursing note reviewed.   Constitutional:       Appearance: Normal appearance. Frail.   HENT:    Mouth/Throat:      Pharynx: Oropharynx is clear.   Pulmonary:      Effort: Pulmonary effort is normal.      Breath sounds: Normal breath sounds.   Cardiovascular:      Normal rate. Occasional ectopic beats. Regular rhythm.      Murmurs: There is no murmur.   Edema:     Peripheral edema absent.   Skin:     General: Skin is warm.   Neurological:      General: No focal deficit present.      Mental Status: Alert and oriented to person, place, and time.   Psychiatric:         Attention and Perception: Attention and perception normal.         Mood and Affect: Mood and affect normal.         Behavior: Behavior is cooperative.             ECG 12 Lead    Date/Time: 2/21/2023 10:55 PM  Performed by: Tristin Riley MD  Authorized by: Tristin Riley MD   Comparison: compared with previous ECG from 12/19/2023  Similar to previous ECG  Rhythm: sinus rhythm  Ectopy: unifocal PVCs        I reviewed his Holter monitor.  Frequent ventricular ectopy    I reviewed his echo.  He has a normal ejection fraction.     "   Assessment:       Diagnosis Plan   1. PVC (premature ventricular contraction)               Plan:       He has frequent PVCs with possible mild symptoms.  His ejection fraction is normal.  He is elderly and frail, mild CI    I recommend no treatment, and I would stop beta blocker.  Beta blocker unlikely to suppress PVC and may worsen symptoms due to orthostasis etc.     Ablation or antiarrhythmic therapy would offer significant risk without clear benefit.     As always, it has been a pleasure to participate in your patient's care.      Sincerely,         Tristin Riley MD

## 2023-05-09 ENCOUNTER — OFFICE VISIT (OUTPATIENT)
Dept: CARDIOLOGY | Facility: CLINIC | Age: 88
End: 2023-05-09
Payer: MEDICARE

## 2023-05-09 VITALS
OXYGEN SATURATION: 99 % | HEIGHT: 68 IN | HEART RATE: 52 BPM | DIASTOLIC BLOOD PRESSURE: 96 MMHG | SYSTOLIC BLOOD PRESSURE: 124 MMHG | BODY MASS INDEX: 23.64 KG/M2 | WEIGHT: 156 LBS

## 2023-05-09 DIAGNOSIS — E78.5 HYPERLIPIDEMIA, UNSPECIFIED HYPERLIPIDEMIA TYPE: ICD-10-CM

## 2023-05-09 DIAGNOSIS — I10 PRIMARY HYPERTENSION: ICD-10-CM

## 2023-05-09 DIAGNOSIS — I49.3 PVC (PREMATURE VENTRICULAR CONTRACTION): Primary | ICD-10-CM

## 2023-05-09 NOTE — PROGRESS NOTES
Subjective:     Encounter Date:05/09/23      Patient ID: Juan C Ca is a 87 y.o. male.    Chief Complaint:   Chief Complaint   Patient presents with   • Follow-up     2 month    • Hypertension   • Hyperlipidemia   • pvc      History of Present Illness    Mr. Ca is an 87-year-old gentleman past medical history of rheumatoid arthritis, remote TBI with mild cognitive impairment, subsequent seizure disorder who presents for further evaluation of dizziness in the setting of a Holter with large PVC burden.    Patient presents today with his daughter who corroborates history.  States that over the past several years he had intermittent dizzy spells been worked up by neurology.  He states that sensation of lightheadedness that occurs sporadically, sometimes exertional, and sometimes it changes with position.  He denies overt vertigo-like sensations.  He has had a rather extensive neurology work-up which has been unrevealing.    He had issues with labile hypertension as well.  At last visit, he actually was evaluated Dr. Riley with cardiac EP given his intermittent dizzy spells with PVCs.  At that time, it was felt that his symptoms are unlikely related to his PVCs, he has a normal echo, and normal stress test, and these were best managed conservatively.  In fact, no management was really needed at all.  These are likely incidental.    He was instructed to consider stopping his metoprolol, however he had some more labile hypertension after being off of it, and has been maintained on metoprolol and his blood pressures have been well controlled.  On evaluation today, he denies new complaints.  No chest pains, no shortness of breath.  Some of the gait unsteadiness persists, but is not particularly new or worse       The following portions of the patient's history were reviewed and updated as appropriate: allergies, current medications, past family history, past medical history, past social history, past  "surgical history and problem list.    Past Medical History:   Diagnosis Date   • Cancer     skin cancer   • CVD (cerebrovascular disease)     with remote infarcts per CT of head.   • H/O Iron deficiency anemia    • H/O Traumatic brain injury (CMS/HCC)    • H/O: pneumonia 04/2015   • Hyperlipidemia    • Hypertension    • CRISTAL (obstructive sleep apnea)    • Premature atrial contraction    • Rheumatoid arthritis    • Seizures     Started in 1960s.       Past Surgical History:   Procedure Laterality Date   • APPENDECTOMY  1950   • CATARACT EXTRACTION     • COLONOSCOPY N/A 12/12/2017    Procedure: COLONOSCOPY TO CECUM AND TI WITH APC CAUTERY OF RIGHT COLON AVM ;  Surgeon: Lucio Stein MD;  Location: [facility] ENDOSCOPY;  Service:    • ENDOSCOPY N/A 12/12/2017    Procedure: ESOPHAGOGASTRODUODENOSCOPY with BX ;  Surgeon: Lucio Stein MD;  Location: [facility] ENDOSCOPY;  Service:          Procedures       Objective:     Vitals:    05/09/23 1405   BP: 124/96   Pulse: 52   SpO2: 99%   Weight: 70.8 kg (156 lb)   Height: 172.7 cm (68\")         Constitutional:       Appearance: Healthy appearance. Not in distress.   Neck:      Vascular: JVD normal.   Pulmonary:      Effort: Pulmonary effort is normal.      Breath sounds: Normal breath sounds.   Cardiovascular:      PMI at left midclavicular line. Normal rate. Regularly irregular rhythm. Normal S2.      Murmurs: There is no murmur.      Comments: Clinical exam suggest PVCs  Pulses:     Intact distal pulses.   Edema:     Peripheral edema absent.   Musculoskeletal:      Comments: Gait unsteadiness noted. Skin:     General: Skin is warm and dry.   Neurological:      General: No focal deficit present.      Mental Status: Alert, oriented to person, place, and time and oriented to person, place and time.   Psychiatric:         Mood and Affect: Mood and affect normal.         Lab Review:       BUN   Date Value Ref Range Status   12/19/2022 17 8 - 23 mg/dL Final     Creatinine   Date " Value Ref Range Status   12/19/2022 1.03 0.76 - 1.27 mg/dL Final     Potassium   Date Value Ref Range Status   12/19/2022 4.7 3.5 - 5.2 mmol/L Final     ALT (SGPT)   Date Value Ref Range Status   12/19/2022 16 1 - 41 U/L Final     AST (SGOT)   Date Value Ref Range Status   12/19/2022 25 1 - 40 U/L Final     Labs reviewed from primary care doctor's office on November 12, 2022:  Hemoglobin 11  Platelets 115  Ferritin mildly elevated 485  He underwent a Holter on November 23, 2022:  He wore the Holter for 1 day.  He was noted to have 26.4% PVC burden.  His average heart rate was 62 with a minimum of 47.    Performed        Assessment:          Diagnosis Plan   1. PVC (premature ventricular contraction)        2. Hyperlipidemia, unspecified hyperlipidemia type        3. Primary hypertension               Plan:           1. Dizziness  2. Premature ventricular contractions  - He does have a complex neurologic history with seizures and prior TBI, which likely complicates the history.  His history is somewhat limited due to difficulty expressing himself, however his daughter has noted that he has occasional spells where he will feel lightheaded that happens at rest.  It does not sound like vertigo  - His Holter was markedly abnormal with 26% PVCs.  He does have some dyspnea on exertion.  -Stress test and echo were both unremarkable for cause of PVCs.  -She was evaluated cardiac EP, it was felt that the PVCs are unlikely contributing to his current symptoms, no need for further therapy was warranted.  -She has been maintained on metoprolol due to escalating hypertension, which is currently well controlled on this regimen.     3. Seizure disorder  4. History of TBI  - Has had a pretty extensive neurologic work-up for the dizziness including MRIs, without a clear result  - PVCs do not seem like that they have had any significant toll on his myocardium, unlikely this is contributing    5.  Mild anemia of chronic  disease  -Likely related to his underlying rheumatoid arthritis.  Hemoglobin 11 is unlikely to be significant to cause dizziness and lightheadedness.      RTC 1 year for symptom check-in.  We will clinically monitor for any worsening side effects related to PVCs including any myocardial dysfunction, signs of heart failure.  I instructed patient's family on signs or symptoms to look for this, but none are noted currently patient overall states he feels at baseline.      Current Outpatient Medications:   •  acetaminophen (TYLENOL) 500 MG tablet, Take 2 tablets by mouth Every 6 (Six) Hours As Needed for Mild Pain  or Fever., Disp:  , Rfl:   •  aspirin 81 MG EC tablet, Take 1 tablet by mouth Daily., Disp: , Rfl:   •  cetirizine (zyrTEC) 10 MG tablet, Take 0.5 tablets by mouth Daily., Disp: , Rfl:   •  famotidine (PEPCID) 20 MG tablet, Take 1 tablet by mouth Daily., Disp: 30 tablet, Rfl: 3  •  hydroxychloroquine (PLAQUENIL) 200 MG tablet, Take 1 tablet by mouth 2 (Two) Times a Day., Disp: , Rfl:   •  leflunomide (ARAVA) 20 MG tablet, Take 1 tablet by mouth Daily., Disp: , Rfl:   •  levETIRAcetam (KEPPRA) 500 MG tablet, Take 1 tablet by mouth 2 (Two) Times a Day., Disp: , Rfl:   •  levothyroxine (SYNTHROID, LEVOTHROID) 25 MCG tablet, Take 1 tablet by mouth Daily., Disp: , Rfl:   •  memantine (NAMENDA) 10 MG tablet, Take 1 tablet by mouth Daily., Disp: , Rfl:   •  metoprolol tartrate (Lopressor) 50 MG tablet, Take 0.5 tablets by mouth 2 (Two) Times a Day., Disp: 60 tablet, Rfl: 3  •  Mirabegron ER (MYRBETRIQ) 50 MG tablet sustained-release 24 hour 24 hr tablet, Take 50 mg by mouth., Disp: , Rfl:   •  Multiple Vitamin (MULTI VITAMIN MENS PO), Take 1 tablet by mouth Daily., Disp: , Rfl:   •  multivitamins-minerals (PRESERVISION AREDS 2) capsule capsule, , Disp: , Rfl:   •  tamsulosin (FLOMAX) 0.4 MG capsule 24 hr capsule, Take 1 capsule by mouth every night., Disp: 30 capsule, Rfl: 0  •  vitamin D3 125 MCG (5000 UT) capsule  capsule, , Disp: , Rfl:          Return in about 1 year (around 5/9/2024).      **Harlan Disclaimer:   Much of this encounter note is an electronic transcription/translation of spoken language to printed text. The electronic translation of spoken language may permit erroneous, or at times, nonsensical words or phrases to be inadvertently transcribed. Although I have reviewed the note for such errors, some may still exist.

## 2023-08-09 ENCOUNTER — TRANSCRIBE ORDERS (OUTPATIENT)
Dept: LAB | Facility: HOSPITAL | Age: 88
End: 2023-08-09
Payer: MEDICARE

## 2023-08-09 ENCOUNTER — LAB (OUTPATIENT)
Dept: LAB | Facility: HOSPITAL | Age: 88
End: 2023-08-09
Payer: MEDICARE

## 2023-08-09 DIAGNOSIS — I51.9 MYXEDEMA HEART DISEASE: ICD-10-CM

## 2023-08-09 DIAGNOSIS — D64.9 ANEMIA, UNSPECIFIED TYPE: Primary | ICD-10-CM

## 2023-08-09 DIAGNOSIS — D64.9 ANEMIA, UNSPECIFIED TYPE: ICD-10-CM

## 2023-08-09 DIAGNOSIS — E03.9 MYXEDEMA HEART DISEASE: ICD-10-CM

## 2023-08-09 LAB
BASOPHILS # BLD AUTO: 0.04 10*3/MM3 (ref 0–0.2)
BASOPHILS NFR BLD AUTO: 0.7 % (ref 0–1.5)
DEPRECATED RDW RBC AUTO: 45.1 FL (ref 37–54)
EOSINOPHIL # BLD AUTO: 0.11 10*3/MM3 (ref 0–0.4)
EOSINOPHIL NFR BLD AUTO: 1.8 % (ref 0.3–6.2)
ERYTHROCYTE [DISTWIDTH] IN BLOOD BY AUTOMATED COUNT: 13 % (ref 12.3–15.4)
FERRITIN SERPL-MCNC: 593 NG/ML (ref 30–400)
FOLATE SERPL-MCNC: >20 NG/ML (ref 4.78–24.2)
HCT VFR BLD AUTO: 36.3 % (ref 37.5–51)
HGB BLD-MCNC: 12 G/DL (ref 13–17.7)
IMM GRANULOCYTES # BLD AUTO: 0.02 10*3/MM3 (ref 0–0.05)
IMM GRANULOCYTES NFR BLD AUTO: 0.3 % (ref 0–0.5)
IRON 24H UR-MRATE: 127 MCG/DL (ref 59–158)
IRON SATN MFR SERPL: 38 % (ref 20–50)
LYMPHOCYTES # BLD AUTO: 1.43 10*3/MM3 (ref 0.7–3.1)
LYMPHOCYTES NFR BLD AUTO: 23.4 % (ref 19.6–45.3)
MCH RBC QN AUTO: 31.4 PG (ref 26.6–33)
MCHC RBC AUTO-ENTMCNC: 33.1 G/DL (ref 31.5–35.7)
MCV RBC AUTO: 95 FL (ref 79–97)
MONOCYTES # BLD AUTO: 0.7 10*3/MM3 (ref 0.1–0.9)
MONOCYTES NFR BLD AUTO: 11.5 % (ref 5–12)
NEUTROPHILS NFR BLD AUTO: 3.8 10*3/MM3 (ref 1.7–7)
NEUTROPHILS NFR BLD AUTO: 62.3 % (ref 42.7–76)
NRBC BLD AUTO-RTO: 0 /100 WBC (ref 0–0.2)
PLATELET # BLD AUTO: 168 10*3/MM3 (ref 140–450)
PMV BLD AUTO: 10.2 FL (ref 6–12)
RBC # BLD AUTO: 3.82 10*6/MM3 (ref 4.14–5.8)
T4 FREE SERPL-MCNC: 1.25 NG/DL (ref 0.93–1.7)
TIBC SERPL-MCNC: 338 MCG/DL (ref 298–536)
TRANSFERRIN SERPL-MCNC: 227 MG/DL (ref 200–360)
TSH SERPL DL<=0.05 MIU/L-ACNC: 4.19 UIU/ML (ref 0.27–4.2)
VIT B12 BLD-MCNC: 775 PG/ML (ref 211–946)
WBC NRBC COR # BLD: 6.1 10*3/MM3 (ref 3.4–10.8)

## 2023-08-09 PROCEDURE — 84443 ASSAY THYROID STIM HORMONE: CPT

## 2023-08-09 PROCEDURE — 82746 ASSAY OF FOLIC ACID SERUM: CPT

## 2023-08-09 PROCEDURE — 36415 COLL VENOUS BLD VENIPUNCTURE: CPT

## 2023-08-09 PROCEDURE — 84466 ASSAY OF TRANSFERRIN: CPT

## 2023-08-09 PROCEDURE — 82728 ASSAY OF FERRITIN: CPT

## 2023-08-09 PROCEDURE — 83540 ASSAY OF IRON: CPT

## 2023-08-09 PROCEDURE — 84439 ASSAY OF FREE THYROXINE: CPT

## 2023-08-09 PROCEDURE — 85025 COMPLETE CBC W/AUTO DIFF WBC: CPT

## 2023-08-09 PROCEDURE — 82607 VITAMIN B-12: CPT

## 2024-01-15 ENCOUNTER — OFFICE VISIT (OUTPATIENT)
Dept: SLEEP MEDICINE | Facility: HOSPITAL | Age: 89
End: 2024-01-15
Payer: MEDICARE

## 2024-01-15 VITALS
BODY MASS INDEX: 23.04 KG/M2 | HEIGHT: 68 IN | SYSTOLIC BLOOD PRESSURE: 150 MMHG | OXYGEN SATURATION: 96 % | DIASTOLIC BLOOD PRESSURE: 68 MMHG | WEIGHT: 152 LBS | HEART RATE: 53 BPM

## 2024-01-15 DIAGNOSIS — G47.33 OSA ON CPAP: Primary | ICD-10-CM

## 2024-01-15 NOTE — PROGRESS NOTES
"[facility]         Dr Jana Jones  [unfilled]  Patient Care Team:  Tej Cedillo Jr., MD as PCP - General  Tej Cedillo Jr., MD as PCP - Family Medicine  Tej Cedillo Jr., MD as Referring Physician (Internal Medicine)  Rasheeda Novoa MD as Consulting Physician (Hematology and Oncology)    Chief Complaint:Overall apnea index of 18.9 events per hour  RDI 24 events per hour  Apnea index left side 16.4 events per hour  Apnea index on the right side 19.3 events per hour  Apnea index during REM sleep 13 events per hour  Recommend treatment pressures of 8 cm with heated humidity and ramp    Interval History: Patient of annual compliance visit.  Currently set up on auto CPAP 8 to 20 cm.  Compliance 72% with average daily use 7 hours 59 minutes.  AHI leak within normal limits.  Patient reports mask leaks and wants mask refitting to be done.  Goes to bed 10 gets up 9 gets about 7+ hours of sleep more rested with the CPAP.  Reports constant runny nose also.  No tobacco no alcohol no caffeine abuse.  Currently has a full facemask with issues as above.  Wants mask refitting.  [place] 0 out of 24 within normal limits.    REVIEW OF SYSTEMS:   CARDIOVASCULAR: No chest pain, chest pressure or chest discomfort. No palpitations or edema.   RESPIRATORY: No shortness of breath, cough or sputum.   GASTROINTESTINAL: No anorexia, nausea, vomiting or diarrhea. No abdominal pain or blood.   HEMATOLOGIC: No bleeding or bruising.  Positive for postnasal drainage    Ventilator/Non-Invasive Ventilation Settings (From admission, onward)      None              Vital Signs  Heart Rate:  [53] 53  BP: (150)/(68) 150/68  [unfilled]  Flowsheet Rows      Flowsheet Row First Filed Value   Admission Height 172.7 cm (67.99\") Documented at 01/15/2024 1100   Admission Weight 68.9 kg (152 lb) Documented at 01/15/2024 1100            Physical Exam:  Patient is examined using the personal protective equipment as per " guidelines from infection control for this particular patient as enacted.  Hand hygiene was performed before and after patient interaction.   General Appearance:    Alert, cooperative, in no acute distress.  Following simple commands  ENT Mallampati between 3 and 4 no nasal congestion  Neck midline trachea, no thyromegaly   Lungs:     Clear to auscultation, respirations regular, even and                  unlabored    Heart:    Regular rhythm and normal rate, normal S1 and S2, no            murmur, no gallop, no rub, no click   Chest Wall:    No abnormalities observed   Abdomen:     Normal bowel sounds, no masses, no organomegaly, soft        nontender, nondistended, no guarding, no rebound                tenderness   Extremities:   Moves all extremities well, no edema, no cyanosis, no             redness  CNS no focal neurological deficits normal sensory exam  Skin no rashes no nodules  Musculoskeletal no cyanosis no clubbing normal range of motion     Results Review:                                          I reviewed the patient's new clinical results.  I personally viewed and interpreted the patient's chest x-ray.        Medication Review:       No current facility-administered medications for this visit.      ASSESSMENT:   CRISTAL on CPAP  History of Covid infection  Allergic rhinitis  Rheumatoid arthritis  Seizure disorder  History of tobacco abuse    PLAN:  Reviewed compliance download with the patient  Compliance AHI and leak from the download looks excellent  Per patient request mask refitting will do mask refitting here in the sleep lab  Sleep hygiene measures  Treatment of allergies discussed in detail.  May adjust humidification lower also to see if drainage can be reduced  Correlation between CRISTAL and current comorbidities was also discussed  We will follow-up in 1 year      Jana Jones MD  01/15/24  12:04 EST

## 2024-01-31 ENCOUNTER — APPOINTMENT (OUTPATIENT)
Dept: GENERAL RADIOLOGY | Facility: HOSPITAL | Age: 89
DRG: 195 | End: 2024-01-31
Payer: MEDICARE

## 2024-01-31 ENCOUNTER — HOSPITAL ENCOUNTER (INPATIENT)
Facility: HOSPITAL | Age: 89
LOS: 1 days | Discharge: HOME HEALTH | DRG: 195 | End: 2024-02-02
Attending: EMERGENCY MEDICINE | Admitting: INTERNAL MEDICINE
Payer: MEDICARE

## 2024-01-31 DIAGNOSIS — J10.1 INFLUENZA A: ICD-10-CM

## 2024-01-31 DIAGNOSIS — R11.2 NAUSEA AND VOMITING, UNSPECIFIED VOMITING TYPE: ICD-10-CM

## 2024-01-31 DIAGNOSIS — R53.1 GENERALIZED WEAKNESS: ICD-10-CM

## 2024-01-31 DIAGNOSIS — R50.9 FEVER AND CHILLS: Primary | ICD-10-CM

## 2024-01-31 LAB
ALBUMIN SERPL-MCNC: 3.4 G/DL (ref 3.5–5.2)
ALBUMIN/GLOB SERPL: 1.5 G/DL
ALP SERPL-CCNC: 76 U/L (ref 39–117)
ALT SERPL W P-5'-P-CCNC: 18 U/L (ref 1–41)
ANION GAP SERPL CALCULATED.3IONS-SCNC: 12.3 MMOL/L (ref 5–15)
AST SERPL-CCNC: 30 U/L (ref 1–40)
B PARAPERT DNA SPEC QL NAA+PROBE: NOT DETECTED
B PERT DNA SPEC QL NAA+PROBE: NOT DETECTED
BASOPHILS # BLD AUTO: 0.03 10*3/MM3 (ref 0–0.2)
BASOPHILS NFR BLD AUTO: 0.6 % (ref 0–1.5)
BILIRUB SERPL-MCNC: 0.3 MG/DL (ref 0–1.2)
BUN SERPL-MCNC: 19 MG/DL (ref 8–23)
BUN/CREAT SERPL: 21.8 (ref 7–25)
C PNEUM DNA NPH QL NAA+NON-PROBE: NOT DETECTED
CALCIUM SPEC-SCNC: 8.3 MG/DL (ref 8.6–10.5)
CHLORIDE SERPL-SCNC: 100 MMOL/L (ref 98–107)
CO2 SERPL-SCNC: 20.7 MMOL/L (ref 22–29)
CREAT SERPL-MCNC: 0.87 MG/DL (ref 0.76–1.27)
D-LACTATE SERPL-SCNC: 0.6 MMOL/L (ref 0.5–2)
DEPRECATED RDW RBC AUTO: 44.8 FL (ref 37–54)
EGFRCR SERPLBLD CKD-EPI 2021: 84 ML/MIN/1.73
EOSINOPHIL # BLD AUTO: 0.05 10*3/MM3 (ref 0–0.4)
EOSINOPHIL NFR BLD AUTO: 1 % (ref 0.3–6.2)
ERYTHROCYTE [DISTWIDTH] IN BLOOD BY AUTOMATED COUNT: 13.2 % (ref 12.3–15.4)
FLUAV H1 2009 PAND RNA NPH QL NAA+PROBE: DETECTED
FLUBV RNA ISLT QL NAA+PROBE: NOT DETECTED
GLOBULIN UR ELPH-MCNC: 2.3 GM/DL
GLUCOSE SERPL-MCNC: 94 MG/DL (ref 65–99)
HADV DNA SPEC NAA+PROBE: NOT DETECTED
HCOV 229E RNA SPEC QL NAA+PROBE: NOT DETECTED
HCOV HKU1 RNA SPEC QL NAA+PROBE: NOT DETECTED
HCOV NL63 RNA SPEC QL NAA+PROBE: NOT DETECTED
HCOV OC43 RNA SPEC QL NAA+PROBE: NOT DETECTED
HCT VFR BLD AUTO: 29.2 % (ref 37.5–51)
HGB BLD-MCNC: 9.8 G/DL (ref 13–17.7)
HMPV RNA NPH QL NAA+NON-PROBE: NOT DETECTED
HOLD SPECIMEN: NORMAL
HOLD SPECIMEN: NORMAL
HPIV1 RNA ISLT QL NAA+PROBE: NOT DETECTED
HPIV2 RNA SPEC QL NAA+PROBE: NOT DETECTED
HPIV3 RNA NPH QL NAA+PROBE: NOT DETECTED
HPIV4 P GENE NPH QL NAA+PROBE: NOT DETECTED
IMM GRANULOCYTES # BLD AUTO: 0.02 10*3/MM3 (ref 0–0.05)
IMM GRANULOCYTES NFR BLD AUTO: 0.4 % (ref 0–0.5)
LIPASE SERPL-CCNC: 33 U/L (ref 13–60)
LYMPHOCYTES # BLD AUTO: 0.32 10*3/MM3 (ref 0.7–3.1)
LYMPHOCYTES NFR BLD AUTO: 6.3 % (ref 19.6–45.3)
M PNEUMO IGG SER IA-ACNC: NOT DETECTED
MCH RBC QN AUTO: 31.9 PG (ref 26.6–33)
MCHC RBC AUTO-ENTMCNC: 33.6 G/DL (ref 31.5–35.7)
MCV RBC AUTO: 95.1 FL (ref 79–97)
MONOCYTES # BLD AUTO: 0.6 10*3/MM3 (ref 0.1–0.9)
MONOCYTES NFR BLD AUTO: 11.8 % (ref 5–12)
NEUTROPHILS NFR BLD AUTO: 4.06 10*3/MM3 (ref 1.7–7)
NEUTROPHILS NFR BLD AUTO: 79.9 % (ref 42.7–76)
NRBC BLD AUTO-RTO: 0.2 /100 WBC (ref 0–0.2)
PLATELET # BLD AUTO: 121 10*3/MM3 (ref 140–450)
PMV BLD AUTO: 10.5 FL (ref 6–12)
POTASSIUM SERPL-SCNC: 3.9 MMOL/L (ref 3.5–5.2)
PROCALCITONIN SERPL-MCNC: 0.08 NG/ML (ref 0–0.25)
PROT SERPL-MCNC: 5.7 G/DL (ref 6–8.5)
RBC # BLD AUTO: 3.07 10*6/MM3 (ref 4.14–5.8)
RHINOVIRUS RNA SPEC NAA+PROBE: NOT DETECTED
RSV RNA NPH QL NAA+NON-PROBE: NOT DETECTED
SARS-COV-2 RNA NPH QL NAA+NON-PROBE: NOT DETECTED
SODIUM SERPL-SCNC: 133 MMOL/L (ref 136–145)
WBC NRBC COR # BLD AUTO: 5.08 10*3/MM3 (ref 3.4–10.8)
WHOLE BLOOD HOLD COAG: NORMAL
WHOLE BLOOD HOLD SPECIMEN: NORMAL

## 2024-01-31 PROCEDURE — 25810000003 SODIUM CHLORIDE 0.9 % SOLUTION: Performed by: EMERGENCY MEDICINE

## 2024-01-31 PROCEDURE — 83605 ASSAY OF LACTIC ACID: CPT

## 2024-01-31 PROCEDURE — 0202U NFCT DS 22 TRGT SARS-COV-2: CPT | Performed by: EMERGENCY MEDICINE

## 2024-01-31 PROCEDURE — 85025 COMPLETE CBC W/AUTO DIFF WBC: CPT

## 2024-01-31 PROCEDURE — 36415 COLL VENOUS BLD VENIPUNCTURE: CPT

## 2024-01-31 PROCEDURE — 83690 ASSAY OF LIPASE: CPT

## 2024-01-31 PROCEDURE — 99285 EMERGENCY DEPT VISIT HI MDM: CPT

## 2024-01-31 PROCEDURE — 71045 X-RAY EXAM CHEST 1 VIEW: CPT

## 2024-01-31 PROCEDURE — 87040 BLOOD CULTURE FOR BACTERIA: CPT | Performed by: EMERGENCY MEDICINE

## 2024-01-31 PROCEDURE — 84145 PROCALCITONIN (PCT): CPT | Performed by: EMERGENCY MEDICINE

## 2024-01-31 PROCEDURE — 80053 COMPREHEN METABOLIC PANEL: CPT

## 2024-01-31 PROCEDURE — 81003 URINALYSIS AUTO W/O SCOPE: CPT

## 2024-01-31 PROCEDURE — 25010000002 ONDANSETRON PER 1 MG: Performed by: EMERGENCY MEDICINE

## 2024-01-31 RX ORDER — SODIUM CHLORIDE 0.9 % (FLUSH) 0.9 %
10 SYRINGE (ML) INJECTION AS NEEDED
Status: DISCONTINUED | OUTPATIENT
Start: 2024-01-31 | End: 2024-02-02 | Stop reason: HOSPADM

## 2024-01-31 RX ORDER — ONDANSETRON 2 MG/ML
4 INJECTION INTRAMUSCULAR; INTRAVENOUS ONCE
Status: COMPLETED | OUTPATIENT
Start: 2024-01-31 | End: 2024-01-31

## 2024-01-31 RX ADMIN — ONDANSETRON 4 MG: 2 INJECTION INTRAMUSCULAR; INTRAVENOUS at 23:37

## 2024-01-31 RX ADMIN — SODIUM CHLORIDE 1000 ML: 9 INJECTION, SOLUTION INTRAVENOUS at 23:37

## 2024-02-01 ENCOUNTER — APPOINTMENT (OUTPATIENT)
Dept: GENERAL RADIOLOGY | Facility: HOSPITAL | Age: 89
DRG: 195 | End: 2024-02-01
Payer: MEDICARE

## 2024-02-01 PROBLEM — G47.33 OSA (OBSTRUCTIVE SLEEP APNEA): Status: ACTIVE | Noted: 2024-02-01

## 2024-02-01 PROBLEM — J10.1 INFLUENZA A: Status: ACTIVE | Noted: 2024-02-01

## 2024-02-01 PROBLEM — J96.01 ACUTE RESPIRATORY FAILURE WITH HYPOXIA: Status: ACTIVE | Noted: 2024-02-01

## 2024-02-01 LAB
ANION GAP SERPL CALCULATED.3IONS-SCNC: 10.3 MMOL/L (ref 5–15)
BASOPHILS # BLD AUTO: 0.03 10*3/MM3 (ref 0–0.2)
BASOPHILS NFR BLD AUTO: 0.7 % (ref 0–1.5)
BILIRUB UR QL STRIP: NEGATIVE
BUN SERPL-MCNC: 15 MG/DL (ref 8–23)
BUN/CREAT SERPL: 16.3 (ref 7–25)
CALCIUM SPEC-SCNC: 7.9 MG/DL (ref 8.6–10.5)
CHLORIDE SERPL-SCNC: 107 MMOL/L (ref 98–107)
CLARITY UR: CLEAR
CO2 SERPL-SCNC: 18.7 MMOL/L (ref 22–29)
COLOR UR: YELLOW
CREAT SERPL-MCNC: 0.92 MG/DL (ref 0.76–1.27)
DEPRECATED RDW RBC AUTO: 46.2 FL (ref 37–54)
EGFRCR SERPLBLD CKD-EPI 2021: 81 ML/MIN/1.73
EOSINOPHIL # BLD AUTO: 0.01 10*3/MM3 (ref 0–0.4)
EOSINOPHIL NFR BLD AUTO: 0.2 % (ref 0.3–6.2)
ERYTHROCYTE [DISTWIDTH] IN BLOOD BY AUTOMATED COUNT: 13 % (ref 12.3–15.4)
FERRITIN SERPL-MCNC: 628 NG/ML (ref 30–400)
GLUCOSE SERPL-MCNC: 83 MG/DL (ref 65–99)
GLUCOSE UR STRIP-MCNC: NEGATIVE MG/DL
HCT VFR BLD AUTO: 30.8 % (ref 37.5–51)
HGB BLD-MCNC: 10 G/DL (ref 13–17.7)
HGB UR QL STRIP.AUTO: NEGATIVE
IMM GRANULOCYTES # BLD AUTO: 0.01 10*3/MM3 (ref 0–0.05)
IMM GRANULOCYTES NFR BLD AUTO: 0.2 % (ref 0–0.5)
IRON 24H UR-MRATE: 20 MCG/DL (ref 59–158)
IRON SATN MFR SERPL: 7 % (ref 20–50)
KETONES UR QL STRIP: NEGATIVE
LEUKOCYTE ESTERASE UR QL STRIP.AUTO: NEGATIVE
LYMPHOCYTES # BLD AUTO: 0.58 10*3/MM3 (ref 0.7–3.1)
LYMPHOCYTES NFR BLD AUTO: 12.8 % (ref 19.6–45.3)
MCH RBC QN AUTO: 31.9 PG (ref 26.6–33)
MCHC RBC AUTO-ENTMCNC: 32.5 G/DL (ref 31.5–35.7)
MCV RBC AUTO: 98.4 FL (ref 79–97)
MONOCYTES # BLD AUTO: 0.55 10*3/MM3 (ref 0.1–0.9)
MONOCYTES NFR BLD AUTO: 12.2 % (ref 5–12)
NEUTROPHILS NFR BLD AUTO: 3.34 10*3/MM3 (ref 1.7–7)
NEUTROPHILS NFR BLD AUTO: 73.9 % (ref 42.7–76)
NITRITE UR QL STRIP: NEGATIVE
NRBC BLD AUTO-RTO: 0 /100 WBC (ref 0–0.2)
PH UR STRIP.AUTO: 5.5 [PH] (ref 5–8)
PLATELET # BLD AUTO: 132 10*3/MM3 (ref 140–450)
PMV BLD AUTO: 10.9 FL (ref 6–12)
POTASSIUM SERPL-SCNC: 3.8 MMOL/L (ref 3.5–5.2)
PROT UR QL STRIP: NEGATIVE
RBC # BLD AUTO: 3.13 10*6/MM3 (ref 4.14–5.8)
SODIUM SERPL-SCNC: 136 MMOL/L (ref 136–145)
SP GR UR STRIP: 1.02 (ref 1–1.03)
TIBC SERPL-MCNC: 270 MCG/DL (ref 298–536)
TRANSFERRIN SERPL-MCNC: 181 MG/DL (ref 200–360)
UROBILINOGEN UR QL STRIP: NORMAL
WBC NRBC COR # BLD AUTO: 4.52 10*3/MM3 (ref 3.4–10.8)

## 2024-02-01 PROCEDURE — 82728 ASSAY OF FERRITIN: CPT | Performed by: NURSE PRACTITIONER

## 2024-02-01 PROCEDURE — 25810000003 SODIUM CHLORIDE 0.9 % SOLUTION: Performed by: NURSE PRACTITIONER

## 2024-02-01 PROCEDURE — 71046 X-RAY EXAM CHEST 2 VIEWS: CPT

## 2024-02-01 PROCEDURE — 85025 COMPLETE CBC W/AUTO DIFF WBC: CPT | Performed by: NURSE PRACTITIONER

## 2024-02-01 PROCEDURE — 36415 COLL VENOUS BLD VENIPUNCTURE: CPT | Performed by: NURSE PRACTITIONER

## 2024-02-01 PROCEDURE — 83540 ASSAY OF IRON: CPT | Performed by: NURSE PRACTITIONER

## 2024-02-01 PROCEDURE — 80048 BASIC METABOLIC PNL TOTAL CA: CPT | Performed by: NURSE PRACTITIONER

## 2024-02-01 PROCEDURE — 84466 ASSAY OF TRANSFERRIN: CPT | Performed by: NURSE PRACTITIONER

## 2024-02-01 PROCEDURE — 25810000003 SODIUM CHLORIDE 0.9 % SOLUTION: Performed by: INTERNAL MEDICINE

## 2024-02-01 PROCEDURE — 25010000002 NA FERRIC GLUC CPLX PER 12.5 MG: Performed by: INTERNAL MEDICINE

## 2024-02-01 RX ORDER — FAMOTIDINE 20 MG/1
20 TABLET, FILM COATED ORAL DAILY
Status: DISCONTINUED | OUTPATIENT
Start: 2024-02-01 | End: 2024-02-02 | Stop reason: HOSPADM

## 2024-02-01 RX ORDER — AMOXICILLIN 250 MG
2 CAPSULE ORAL 2 TIMES DAILY
Status: DISCONTINUED | OUTPATIENT
Start: 2024-02-01 | End: 2024-02-01

## 2024-02-01 RX ORDER — BISACODYL 5 MG/1
5 TABLET, DELAYED RELEASE ORAL DAILY PRN
Status: DISCONTINUED | OUTPATIENT
Start: 2024-02-01 | End: 2024-02-01

## 2024-02-01 RX ORDER — SODIUM CHLORIDE 0.9 % (FLUSH) 0.9 %
10 SYRINGE (ML) INJECTION EVERY 12 HOURS SCHEDULED
Status: DISCONTINUED | OUTPATIENT
Start: 2024-02-01 | End: 2024-02-02 | Stop reason: HOSPADM

## 2024-02-01 RX ORDER — MEMANTINE HYDROCHLORIDE 10 MG/1
10 TABLET ORAL DAILY
Status: DISCONTINUED | OUTPATIENT
Start: 2024-02-01 | End: 2024-02-02 | Stop reason: HOSPADM

## 2024-02-01 RX ORDER — SODIUM CHLORIDE 9 MG/ML
100 INJECTION, SOLUTION INTRAVENOUS CONTINUOUS
Status: DISCONTINUED | OUTPATIENT
Start: 2024-02-01 | End: 2024-02-01

## 2024-02-01 RX ORDER — LEVOTHYROXINE SODIUM 0.03 MG/1
25 TABLET ORAL DAILY
Status: DISCONTINUED | OUTPATIENT
Start: 2024-02-01 | End: 2024-02-02 | Stop reason: HOSPADM

## 2024-02-01 RX ORDER — BISACODYL 10 MG
10 SUPPOSITORY, RECTAL RECTAL DAILY PRN
Status: DISCONTINUED | OUTPATIENT
Start: 2024-02-01 | End: 2024-02-01

## 2024-02-01 RX ORDER — ACETAMINOPHEN 325 MG/1
650 TABLET ORAL EVERY 4 HOURS PRN
Status: DISCONTINUED | OUTPATIENT
Start: 2024-02-01 | End: 2024-02-02 | Stop reason: HOSPADM

## 2024-02-01 RX ORDER — ACETAMINOPHEN 650 MG/1
650 SUPPOSITORY RECTAL EVERY 4 HOURS PRN
Status: DISCONTINUED | OUTPATIENT
Start: 2024-02-01 | End: 2024-02-02 | Stop reason: HOSPADM

## 2024-02-01 RX ORDER — ACETAMINOPHEN 160 MG/5ML
650 SOLUTION ORAL EVERY 4 HOURS PRN
Status: DISCONTINUED | OUTPATIENT
Start: 2024-02-01 | End: 2024-02-02 | Stop reason: HOSPADM

## 2024-02-01 RX ORDER — OSELTAMIVIR PHOSPHATE 30 MG/1
30 CAPSULE ORAL EVERY 12 HOURS SCHEDULED
Status: DISCONTINUED | OUTPATIENT
Start: 2024-02-01 | End: 2024-02-02

## 2024-02-01 RX ORDER — DIPHENHYDRAMINE HCL 12.5MG/5ML
12.5 LIQUID (ML) ORAL EVERY 24 HOURS
Status: DISCONTINUED | OUTPATIENT
Start: 2024-02-01 | End: 2024-02-02 | Stop reason: HOSPADM

## 2024-02-01 RX ORDER — NITROGLYCERIN 0.4 MG/1
0.4 TABLET SUBLINGUAL
Status: DISCONTINUED | OUTPATIENT
Start: 2024-02-01 | End: 2024-02-02 | Stop reason: HOSPADM

## 2024-02-01 RX ORDER — BISACODYL 5 MG/1
5 TABLET, DELAYED RELEASE ORAL DAILY PRN
Status: DISCONTINUED | OUTPATIENT
Start: 2024-02-01 | End: 2024-02-02 | Stop reason: HOSPADM

## 2024-02-01 RX ORDER — BISACODYL 10 MG
10 SUPPOSITORY, RECTAL RECTAL DAILY PRN
Status: DISCONTINUED | OUTPATIENT
Start: 2024-02-01 | End: 2024-02-02 | Stop reason: HOSPADM

## 2024-02-01 RX ORDER — POLYETHYLENE GLYCOL 3350 17 G/17G
17 POWDER, FOR SOLUTION ORAL DAILY PRN
Status: DISCONTINUED | OUTPATIENT
Start: 2024-02-01 | End: 2024-02-02 | Stop reason: HOSPADM

## 2024-02-01 RX ORDER — AMOXICILLIN 250 MG
2 CAPSULE ORAL 2 TIMES DAILY PRN
Status: DISCONTINUED | OUTPATIENT
Start: 2024-02-01 | End: 2024-02-02 | Stop reason: HOSPADM

## 2024-02-01 RX ORDER — LEVETIRACETAM 500 MG/1
500 TABLET ORAL EVERY 12 HOURS SCHEDULED
Status: DISCONTINUED | OUTPATIENT
Start: 2024-02-01 | End: 2024-02-02 | Stop reason: HOSPADM

## 2024-02-01 RX ORDER — DOCUSATE SODIUM 100 MG/1
100 CAPSULE, LIQUID FILLED ORAL 2 TIMES DAILY
COMMUNITY

## 2024-02-01 RX ORDER — POLYETHYLENE GLYCOL 3350 17 G/17G
17 POWDER, FOR SOLUTION ORAL DAILY PRN
Status: DISCONTINUED | OUTPATIENT
Start: 2024-02-01 | End: 2024-02-01

## 2024-02-01 RX ORDER — ASPIRIN 81 MG/1
81 TABLET ORAL DAILY
Status: DISCONTINUED | OUTPATIENT
Start: 2024-02-01 | End: 2024-02-02 | Stop reason: HOSPADM

## 2024-02-01 RX ORDER — TAMSULOSIN HYDROCHLORIDE 0.4 MG/1
0.4 CAPSULE ORAL NIGHTLY
Status: DISCONTINUED | OUTPATIENT
Start: 2024-02-01 | End: 2024-02-02 | Stop reason: HOSPADM

## 2024-02-01 RX ORDER — SODIUM CHLORIDE 9 MG/ML
40 INJECTION, SOLUTION INTRAVENOUS AS NEEDED
Status: DISCONTINUED | OUTPATIENT
Start: 2024-02-01 | End: 2024-02-02 | Stop reason: HOSPADM

## 2024-02-01 RX ORDER — ONDANSETRON 2 MG/ML
4 INJECTION INTRAMUSCULAR; INTRAVENOUS EVERY 6 HOURS PRN
Status: DISCONTINUED | OUTPATIENT
Start: 2024-02-01 | End: 2024-02-02 | Stop reason: HOSPADM

## 2024-02-01 RX ORDER — ACETAMINOPHEN 325 MG/1
650 TABLET ORAL EVERY 24 HOURS
Status: DISCONTINUED | OUTPATIENT
Start: 2024-02-01 | End: 2024-02-02 | Stop reason: HOSPADM

## 2024-02-01 RX ORDER — SODIUM CHLORIDE 0.9 % (FLUSH) 0.9 %
10 SYRINGE (ML) INJECTION AS NEEDED
Status: DISCONTINUED | OUTPATIENT
Start: 2024-02-01 | End: 2024-02-02 | Stop reason: HOSPADM

## 2024-02-01 RX ORDER — OSELTAMIVIR PHOSPHATE 75 MG/1
75 CAPSULE ORAL EVERY 12 HOURS SCHEDULED
Status: CANCELLED | OUTPATIENT
Start: 2024-02-01 | End: 2024-02-06

## 2024-02-01 RX ADMIN — Medication 10 ML: at 19:58

## 2024-02-01 RX ADMIN — DOCUSATE SODIUM 50MG AND SENNOSIDES 8.6MG 2 TABLET: 8.6; 5 TABLET, FILM COATED ORAL at 10:57

## 2024-02-01 RX ADMIN — ACETAMINOPHEN 325MG 650 MG: 325 TABLET ORAL at 13:22

## 2024-02-01 RX ADMIN — SODIUM CHLORIDE 250 MG: 9 INJECTION, SOLUTION INTRAVENOUS at 13:25

## 2024-02-01 RX ADMIN — LEVETIRACETAM 500 MG: 500 TABLET, FILM COATED ORAL at 10:57

## 2024-02-01 RX ADMIN — METOPROLOL TARTRATE 25 MG: 25 TABLET, FILM COATED ORAL at 10:57

## 2024-02-01 RX ADMIN — FAMOTIDINE 20 MG: 20 TABLET, FILM COATED ORAL at 10:57

## 2024-02-01 RX ADMIN — DIPHENHYDRAMINE HYDROCHLORIDE 12.5 MG: 25 SOLUTION ORAL at 13:22

## 2024-02-01 RX ADMIN — MEMANTINE HYDROCHLORIDE 10 MG: 10 TABLET, FILM COATED ORAL at 10:57

## 2024-02-01 RX ADMIN — LEVOTHYROXINE SODIUM 25 MCG: 25 TABLET ORAL at 10:57

## 2024-02-01 RX ADMIN — Medication 10 ML: at 03:36

## 2024-02-01 RX ADMIN — SODIUM CHLORIDE 100 ML/HR: 9 INJECTION, SOLUTION INTRAVENOUS at 03:11

## 2024-02-01 RX ADMIN — TAMSULOSIN HYDROCHLORIDE 0.4 MG: 0.4 CAPSULE ORAL at 19:55

## 2024-02-01 RX ADMIN — METOPROLOL TARTRATE 25 MG: 25 TABLET, FILM COATED ORAL at 19:55

## 2024-02-01 RX ADMIN — OSELTAMIVIR PHOSPHATE 30 MG: 30 CAPSULE ORAL at 13:23

## 2024-02-01 RX ADMIN — LEVETIRACETAM 500 MG: 500 TABLET, FILM COATED ORAL at 19:55

## 2024-02-01 RX ADMIN — OSELTAMIVIR PHOSPHATE 30 MG: 30 CAPSULE ORAL at 19:55

## 2024-02-01 RX ADMIN — Medication 10 ML: at 10:58

## 2024-02-01 RX ADMIN — ASPIRIN 81 MG: 81 TABLET, COATED ORAL at 10:57

## 2024-02-01 NOTE — ED PROVIDER NOTES
EMERGENCY DEPARTMENT ENCOUNTER    Room Number:  22/22  PCP: Tej Cedillo Jr., MD  Historian: Patient      HPI:  Chief Complaint: Fever/chills  A complete HPI/ROS/PMH/PSH/SH/FH are unobtainable due to: None  Context: Juan C Ca is a 88 y.o. male who presents to the ED c/o fevers and chills that were gradual in onset and has been present now for the past 2 to 3 days.  Associated with this, he does complain of some nausea and vomiting as well as cough with shortness of breath.  He did take a Tylenol prior to ED arrival without any real improvement in symptoms.  He currently denies chest pain, back pain, abdominal pain, or diarrhea/constipation.            PAST MEDICAL HISTORY  Active Ambulatory Problems     Diagnosis Date Noted    Iron deficiency anemia 04/08/2019    Adverse effect of iron 05/06/2019    Febrile illness, acute 06/14/2019    Hyperlipidemia 06/14/2019    Hypertension 06/14/2019    Rheumatoid arthritis 06/14/2019    Seizures 06/14/2019    Drug induced fever 06/15/2019    Hypokalemia 06/16/2019    Anemia of chronic disease 06/17/2019    Urinary frequency 06/17/2019    Pneumonia of left lung due to infectious organism 12/31/2020    COVID-19 virus detected 12/31/2020    Pneumonia due to COVID-19 virus 01/01/2021    Dehydration 01/01/2021    Shingles 01/04/2021    Elevated liver function tests 01/04/2021    Anemia, chronic disease 01/04/2021    PVC (premature ventricular contraction) 02/21/2023     Resolved Ambulatory Problems     Diagnosis Date Noted    Hypoxia 06/18/2019    TBI (traumatic brain injury) 12/31/2020    Nausea & vomiting 01/01/2021    Neutropenia 01/03/2021     Past Medical History:   Diagnosis Date    Cancer     CVD (cerebrovascular disease)     H/O Traumatic brain injury (CMS/HCC)     H/O: pneumonia 04/2015    CRISTAL (obstructive sleep apnea)     Premature atrial contraction          PAST SURGICAL HISTORY  Past Surgical History:   Procedure Laterality Date    APPENDECTOMY  1950     CATARACT EXTRACTION      COLONOSCOPY N/A 2017    Procedure: COLONOSCOPY TO CECUM AND TI WITH APC CAUTERY OF RIGHT COLON AVM ;  Surgeon: Lucio Stein MD;  Location: [facility] ENDOSCOPY;  Service:     ENDOSCOPY N/A 2017    Procedure: ESOPHAGOGASTRODUODENOSCOPY with BX ;  Surgeon: Lucio Stein MD;  Location: [facility] ENDOSCOPY;  Service:          FAMILY HISTORY  Family History   Problem Relation Age of Onset    Colon cancer Other     Hypertension Mother     Mental illness Mother     Hypertension Father     Heart disease Sister     Hypertension Sister     Colon cancer Sister     Skin cancer Sister     Heart disease Brother     Hypertension Brother     Lung cancer Brother     Diabetes Daughter     Throat cancer Brother     Cancer Brother         Bladder    Prostate cancer Brother          SOCIAL HISTORY  Social History     Socioeconomic History    Marital status:      Spouse name: Abbie    Years of education: College   Tobacco Use    Smoking status: Former     Years: 15     Types: Cigarettes     Quit date: 1987     Years since quittin.1    Smokeless tobacco: Never    Tobacco comments:     Caffeine - coffee and tea    Vaping Use    Vaping Use: Never used   Substance and Sexual Activity    Alcohol use: No    Drug use: No    Sexual activity: Yes     Partners: Female     Birth control/protection: None         ALLERGIES  Sulfadiazine        REVIEW OF SYSTEMS  Review of Systems   Constitutional:  Positive for chills and fever. Negative for activity change and appetite change.   HENT:  Negative for congestion and sore throat.    Eyes: Negative.    Respiratory:  Positive for cough and shortness of breath.    Cardiovascular:  Negative for chest pain and leg swelling.   Gastrointestinal:  Positive for nausea and vomiting. Negative for abdominal pain and diarrhea.   Endocrine: Negative.    Genitourinary:  Negative for decreased urine volume and dysuria.   Musculoskeletal:  Negative for neck pain.    Skin:  Negative for rash and wound.   Allergic/Immunologic: Negative.    Neurological:  Negative for weakness, numbness and headaches.   Hematological: Negative.    Psychiatric/Behavioral: Negative.     All other systems reviewed and are negative.         PHYSICAL EXAM  ED Triage Vitals [01/31/24 2207]   Temp Heart Rate Resp BP SpO2   (!) 101.1 °F (38.4 °C) 73 24 (!) 189/83 93 %      Temp src Heart Rate Source Patient Position BP Location FiO2 (%)   Oral Monitor -- -- --       Physical Exam  Constitutional:       General: He is not in acute distress.     Appearance: Normal appearance. He is not ill-appearing or toxic-appearing.   HENT:      Head: Normocephalic and atraumatic.   Eyes:      Extraocular Movements: Extraocular movements intact.      Pupils: Pupils are equal, round, and reactive to light.   Cardiovascular:      Rate and Rhythm: Normal rate and regular rhythm.      Heart sounds: No murmur heard.     No friction rub. No gallop.   Pulmonary:      Effort: Pulmonary effort is normal.      Breath sounds: Normal breath sounds.   Abdominal:      General: Abdomen is flat. There is no distension.      Palpations: Abdomen is soft.      Tenderness: There is no abdominal tenderness.   Musculoskeletal:         General: No swelling or tenderness. Normal range of motion.      Cervical back: Normal range of motion and neck supple.   Skin:     General: Skin is warm and dry.   Neurological:      General: No focal deficit present.      Mental Status: He is alert and oriented to person, place, and time.      Sensory: No sensory deficit.      Motor: No weakness.   Psychiatric:         Mood and Affect: Mood normal.         Behavior: Behavior normal.         Vital signs and nursing notes reviewed.          LAB RESULTS  Recent Results (from the past 24 hour(s))   Comprehensive Metabolic Panel    Collection Time: 01/31/24 10:40 PM    Specimen: Arm, Left; Blood   Result Value Ref Range    Glucose 94 65 - 99 mg/dL    BUN 19 8 -  23 mg/dL    Creatinine 0.87 0.76 - 1.27 mg/dL    Sodium 133 (L) 136 - 145 mmol/L    Potassium 3.9 3.5 - 5.2 mmol/L    Chloride 100 98 - 107 mmol/L    CO2 20.7 (L) 22.0 - 29.0 mmol/L    Calcium 8.3 (L) 8.6 - 10.5 mg/dL    Total Protein 5.7 (L) 6.0 - 8.5 g/dL    Albumin 3.4 (L) 3.5 - 5.2 g/dL    ALT (SGPT) 18 1 - 41 U/L    AST (SGOT) 30 1 - 40 U/L    Alkaline Phosphatase 76 39 - 117 U/L    Total Bilirubin 0.3 0.0 - 1.2 mg/dL    Globulin 2.3 gm/dL    A/G Ratio 1.5 g/dL    BUN/Creatinine Ratio 21.8 7.0 - 25.0    Anion Gap 12.3 5.0 - 15.0 mmol/L    eGFR 84.0 >60.0 mL/min/1.73   Lipase    Collection Time: 01/31/24 10:40 PM    Specimen: Arm, Left; Blood   Result Value Ref Range    Lipase 33 13 - 60 U/L   Lactic Acid, Plasma    Collection Time: 01/31/24 10:40 PM    Specimen: Arm, Left; Blood   Result Value Ref Range    Lactate 0.6 0.5 - 2.0 mmol/L   Green Top (Gel)    Collection Time: 01/31/24 10:40 PM   Result Value Ref Range    Extra Tube Hold for add-ons.    Lavender Top    Collection Time: 01/31/24 10:40 PM   Result Value Ref Range    Extra Tube hold for add-on    Gold Top - SST    Collection Time: 01/31/24 10:40 PM   Result Value Ref Range    Extra Tube Hold for add-ons.    Light Blue Top    Collection Time: 01/31/24 10:40 PM   Result Value Ref Range    Extra Tube Hold for add-ons.    CBC Auto Differential    Collection Time: 01/31/24 10:40 PM    Specimen: Arm, Left; Blood   Result Value Ref Range    WBC 5.08 3.40 - 10.80 10*3/mm3    RBC 3.07 (L) 4.14 - 5.80 10*6/mm3    Hemoglobin 9.8 (L) 13.0 - 17.7 g/dL    Hematocrit 29.2 (L) 37.5 - 51.0 %    MCV 95.1 79.0 - 97.0 fL    MCH 31.9 26.6 - 33.0 pg    MCHC 33.6 31.5 - 35.7 g/dL    RDW 13.2 12.3 - 15.4 %    RDW-SD 44.8 37.0 - 54.0 fl    MPV 10.5 6.0 - 12.0 fL    Platelets 121 (L) 140 - 450 10*3/mm3    Neutrophil % 79.9 (H) 42.7 - 76.0 %    Lymphocyte % 6.3 (L) 19.6 - 45.3 %    Monocyte % 11.8 5.0 - 12.0 %    Eosinophil % 1.0 0.3 - 6.2 %    Basophil % 0.6 0.0 - 1.5 %     Immature Grans % 0.4 0.0 - 0.5 %    Neutrophils, Absolute 4.06 1.70 - 7.00 10*3/mm3    Lymphocytes, Absolute 0.32 (L) 0.70 - 3.10 10*3/mm3    Monocytes, Absolute 0.60 0.10 - 0.90 10*3/mm3    Eosinophils, Absolute 0.05 0.00 - 0.40 10*3/mm3    Basophils, Absolute 0.03 0.00 - 0.20 10*3/mm3    Immature Grans, Absolute 0.02 0.00 - 0.05 10*3/mm3    nRBC 0.2 0.0 - 0.2 /100 WBC   Procalcitonin    Collection Time: 01/31/24 10:40 PM    Specimen: Arm, Left; Blood   Result Value Ref Range    Procalcitonin 0.08 0.00 - 0.25 ng/mL   Respiratory Panel PCR w/COVID-19(SARS-CoV-2) CHELLY/MAULIK/IGOR/PAD/COR/MICHELLE In-House, NP Swab in UTM/VTM, 2 HR TAT - Swab, Nasopharynx    Collection Time: 01/31/24 10:55 PM    Specimen: Nasopharynx; Swab   Result Value Ref Range    ADENOVIRUS, PCR Not Detected Not Detected    Coronavirus 229E Not Detected Not Detected    Coronavirus HKU1 Not Detected Not Detected    Coronavirus NL63 Not Detected Not Detected    Coronavirus OC43 Not Detected Not Detected    COVID19 Not Detected Not Detected - Ref. Range    Human Metapneumovirus Not Detected Not Detected    Human Rhinovirus/Enterovirus Not Detected Not Detected    Influenza A H1 2009 PCR Detected (A) Not Detected    Influenza B PCR Not Detected Not Detected    Parainfluenza Virus 1 Not Detected Not Detected    Parainfluenza Virus 2 Not Detected Not Detected    Parainfluenza Virus 3 Not Detected Not Detected    Parainfluenza Virus 4 Not Detected Not Detected    RSV, PCR Not Detected Not Detected    Bordetella pertussis pcr Not Detected Not Detected    Bordetella parapertussis PCR Not Detected Not Detected    Chlamydophila pneumoniae PCR Not Detected Not Detected    Mycoplasma pneumo by PCR Not Detected Not Detected   Urinalysis With Microscopic If Indicated (No Culture) - Urine, Clean Catch    Collection Time: 01/31/24 11:50 PM    Specimen: Urine, Clean Catch   Result Value Ref Range    Color, UA Yellow Yellow, Straw    Appearance, UA Clear Clear    pH, UA 5.5  5.0 - 8.0    Specific Gravity, UA 1.021 1.005 - 1.030    Glucose, UA Negative Negative    Ketones, UA Negative Negative    Bilirubin, UA Negative Negative    Blood, UA Negative Negative    Protein, UA Negative Negative    Leuk Esterase, UA Negative Negative    Nitrite, UA Negative Negative    Urobilinogen, UA 0.2 E.U./dL 0.2 - 1.0 E.U./dL       Ordered the above labs and reviewed the results.        RADIOLOGY  XR Chest 1 View    Result Date: 1/31/2024  XR CHEST 1 VW-  Clinical: Cough  COMPARISON examination 9/8/2022  FINDINGS: There is chronic appearing pleural and parenchymal change demonstrated at both lung bases, greater on the right than the left. There is cardiac enlargement. No pulmonary edema is demonstrated. No acute airspace disease within the right lung. There is vague opacity demonstrated at the left lung base worrisome for infiltrate/atelectasis and/or pleural fluid. PA and lateral view of the chest would be helpful as follow-up. The remainder is unremarkable.  This report was finalized on 1/31/2024 11:43 PM by Dr. Tim Thayer M.D on Workstation: [id number]       Ordered the above noted radiological studies. Reviewed by me in PACS.            PROCEDURES  Procedures            MEDICATIONS GIVEN IN ER  Medications   sodium chloride 0.9 % flush 10 mL (has no administration in time range)   sodium chloride 0.9 % flush 10 mL (has no administration in time range)   sodium chloride 0.9 % bolus 1,000 mL (1,000 mL Intravenous New Bag 1/31/24 2337)   ondansetron (ZOFRAN) injection 4 mg (4 mg Intravenous Given 1/31/24 2337)                   MEDICAL DECISION MAKING, PROGRESS, and CONSULTS    All labs have been independently reviewed by me.  All radiology studies have been reviewed by me and I have also reviewed the radiology report.   EKG's independently viewed and interpreted by me.  Discussion below represents my analysis of pertinent findings related to patient's condition, differential diagnosis, treatment  plan and final disposition.      Additional sources:  - Discussed/ obtained information from independent historians: History obtained from the patient himself at bedside.    - External (non-ED) record review: Upon medical records review, the patient was last seen and evaluated on 11/30/2023 in the office of primary care for treatment of acute bronchitis.    - Chronic or social conditions impacting care: Previous history of TBI as well as cerebrovascular disease    - Shared decision making: Admission decision based on shared conversations have between myself, the patient and family at bedside, as well as SHARLENE Terry for [facility].      Orders placed during this visit:  Orders Placed This Encounter   Procedures    Blood Culture - Blood,    Blood Culture - Blood,    Respiratory Panel PCR w/COVID-19(SARS-CoV-2) CHELLY/MAULIK/IGOR/PAD/COR/MICHELLE In-House, NP Swab in UTM/VTM, 2 HR TAT - Swab, Nasopharynx    XR Chest 1 View    [place] Draw    Comprehensive Metabolic Panel    Lipase    Urinalysis With Microscopic If Indicated (No Culture) - Urine, Clean Catch    Lactic Acid, Plasma    CBC Auto Differential    Procalcitonin    NPO Diet NPO Type: Strict NPO    Undress & Gown    [facility] (on-call MD unless specified) Details    Insert Peripheral IV    Insert Peripheral IV    Inpatient Admission    CBC & Differential    Green Top (Gel)    Lavender Top    Gold Top - SST    Light Blue Top           Differential diagnosis includes but is not limited to:    Gastroenteritis, bowel obstruction, COVID-19, influenza, acute renal failure, sepsis, or severe electrolyte disturbance.      Independent interpretation of labs, radiology studies, and discussions with consultants:    Chest x-ray was independently interpreted by myself with my interpretation showing mild cardiomegaly without edema but a possible infiltrate to the left lower lobe.      ED Course as of 02/01/24 0106   Wed Jan 31, 2024   9349 I am certainly highly concerned with potential  sepsis for the patient given his fever.  We will begin with IV fluids and antiemetics as we begin his workup. [BM]   Thu Feb 01, 2024   0056 On reevaluation, the patient is resting comfortably and without acute complaint.  The nausea does seem to be a bit improved.  I did inform the patient as well as the patient's family that he tested positive for influenza A.  He at this point does not meet SIRS criteria for sepsis.  However, I would like to admit him to the hospital for hydration and further treatment.  They are in agreement with that plan and all questions have been answered. [BM]   0104 The patient's presentation, workup, as well as diagnosis and treatment plan was discussed at length with SHARLENE Terry for [facility].  She agrees to admit the patient to the hospital today for Dr. Butler. [BM]      ED Course User Index  [BM] Magnus Venegas MD             DIAGNOSIS  Final diagnoses:   Fever and chills   Influenza A   Nausea and vomiting, unspecified vomiting type         DISPOSITION  ADMISSION    Discussed treatment plan and reason for admission with pt/family and admitting physician.  Pt/family voiced understanding of the plan for admission for further testing/treatment as needed.               Latest Documented Vital Signs:  As of 01:06 EST  BP- (!) 189/83 HR- 73 Temp- (!) 101.1 °F (38.4 °C) (Oral) O2 sat- 93%              --    Please note that portions of this were completed with a voice recognition program.       Note Disclaimer: At [facility], we believe that sharing information builds trust and better relationships. You are receiving this note because you are receiving care at [facility] or recently visited. It is possible you will see health information before a provider has talked with you about it. This kind of information can be easy to misunderstand. To help you fully understand what it means for your health, we urge you to discuss this note with your provider.             Theo  Magnus Slaughter MD  02/01/24 0106

## 2024-02-01 NOTE — H&P
Patient Name:  Juan C Ca  YOB: 1935  MRN:  [id number]  Admit Date:  1/31/2024  Patient Care Team:  Tej Cedillo Jr., MD as PCP - General  Tej Cedillo Jr., MD as PCP - Family Medicine  Tej Cedillo Jr., MD as Referring Physician (Internal Medicine)  Rasheeda Novoa MD as Consulting Physician (Hematology and Oncology)      Subjective   History Present Illness     Chief Complaint   Patient presents with    Vomiting    Fever       Mr. Ca is a 88 y.o. former smoker with a history of hypertension, hyperlipidemia, seizures, cerebrovascular disease, rheumatoid arthritis who presents to [facility] ER with chief complaint of vomiting and fever and admitted for influenza A with subsequent acute respiratory failure with hypoxia.    Daughter at bedside assists with history of present illness.  Feeling unwell for 2 to 3 days with fever 102.0 at home, generalized weakness, recent nausea vomiting; therefore, to [facility] ER for further evaluation and diagnosed with Influenza A by PCR.    Oxygen saturation 90% on room air and Tmax 101.1.      Recommendation pending hospital course.  Details below.    History of Present Illness    Review of Systems   Constitutional:  Positive for chills and fever.   HENT:  Positive for congestion and rhinorrhea.    Respiratory:  Positive for shortness of breath. Negative for cough.    Cardiovascular:  Negative for chest pain and leg swelling.   Gastrointestinal:  Positive for nausea and vomiting. Negative for abdominal pain, constipation and diarrhea.   Endocrine: Negative for polydipsia, polyphagia and polyuria.   Genitourinary:  Negative for difficulty urinating and dysuria.   Musculoskeletal:  Positive for gait problem (due to generalized weakness). Negative for myalgias.   Skin:  Negative for rash and wound.   Neurological:  Negative for syncope and light-headedness.   Psychiatric/Behavioral:  Negative for confusion and hallucinations.          Personal History     Past Medical History:   Diagnosis Date    Cancer     skin cancer    CVD (cerebrovascular disease)     with remote infarcts per CT of head.    H/O Iron deficiency anemia     H/O Traumatic brain injury (CMS/HCC)     H/O: pneumonia 2015    Hyperlipidemia     Hypertension     CRISTAL (obstructive sleep apnea)     Premature atrial contraction     Rheumatoid arthritis     Seizures     Started in 1960s.     Past Surgical History:   Procedure Laterality Date    APPENDECTOMY  1950    CATARACT EXTRACTION      COLONOSCOPY N/A 2017    Procedure: COLONOSCOPY TO CECUM AND TI WITH APC CAUTERY OF RIGHT COLON AVM ;  Surgeon: Lucio Stein MD;  Location: [facility] ENDOSCOPY;  Service:     ENDOSCOPY N/A 2017    Procedure: ESOPHAGOGASTRODUODENOSCOPY with BX ;  Surgeon: Lucio Stein MD;  Location: [facility] ENDOSCOPY;  Service:      Family History   Problem Relation Age of Onset    Colon cancer Other     Hypertension Mother     Mental illness Mother     Hypertension Father     Heart disease Sister     Hypertension Sister     Colon cancer Sister     Skin cancer Sister     Heart disease Brother     Hypertension Brother     Lung cancer Brother     Diabetes Daughter     Throat cancer Brother     Cancer Brother         Bladder    Prostate cancer Brother      Social History     Tobacco Use    Smoking status: Former     Years: 15     Types: Cigarettes     Quit date: 1987     Years since quittin.1    Smokeless tobacco: Never    Tobacco comments:     Caffeine - coffee and tea    Vaping Use    Vaping Use: Never used   Substance Use Topics    Alcohol use: No    Drug use: No     No current facility-administered medications on file prior to encounter.     Current Outpatient Medications on File Prior to Encounter   Medication Sig Dispense Refill    acetaminophen (TYLENOL) 500 MG tablet Take 2 tablets by mouth Every 6 (Six) Hours As Needed for Mild Pain  or Fever.      aspirin 81 MG EC tablet Take 1 tablet  by mouth Daily.      cetirizine (zyrTEC) 10 MG tablet Take 0.5 tablets by mouth Daily.      famotidine (PEPCID) 20 MG tablet Take 1 tablet by mouth Daily. 30 tablet 3    hydroxychloroquine (PLAQUENIL) 200 MG tablet Take 1 tablet by mouth Daily.      leflunomide (ARAVA) 20 MG tablet Take 1 tablet by mouth Daily.      levETIRAcetam (KEPPRA) 500 MG tablet Take 1 tablet by mouth 2 (Two) Times a Day.      levothyroxine (SYNTHROID, LEVOTHROID) 25 MCG tablet Take 1 tablet by mouth Daily.      memantine (NAMENDA) 10 MG tablet Take 1 tablet by mouth Daily.      metoprolol tartrate (Lopressor) 50 MG tablet Take 0.5 tablets by mouth 2 (Two) Times a Day. 60 tablet 3    Mirabegron ER (MYRBETRIQ) 50 MG tablet sustained-release 24 hour 24 hr tablet Take 50 mg by mouth.      Multiple Vitamin (MULTI VITAMIN MENS PO) Take 1 tablet by mouth Daily.      multivitamins-minerals (PRESERVISION AREDS 2) capsule capsule       tamsulosin (FLOMAX) 0.4 MG capsule 24 hr capsule Take 1 capsule by mouth every night. 30 capsule 0    vitamin D3 125 MCG (5000 UT) capsule capsule       docusate sodium (COLACE) 100 MG capsule Take 1 capsule by mouth 2 (Two) Times a Day. PRN       Allergies   Allergen Reactions    Sulfadiazine Unknown - High Severity     fever  Other reaction(s): Fever       Objective    Objective     Vital Signs  Temp:  [98.2 °F (36.8 °C)-101.1 °F (38.4 °C)] 99.3 °F (37.4 °C)  Heart Rate:  [69-75] 69  Resp:  [17-24] 18  BP: (142-189)/(65-90) 169/65  SpO2:  [90 %-99 %] 98 %  on  Flow (L/min):  [2] 2;   Device (Oxygen Therapy): nasal cannula  Body mass index is 23.88 kg/m².    Physical Exam  Constitutional:       General: He is not in acute distress.     Appearance: He is not toxic-appearing.      Comments: Generally weak   HENT:      Head: Normocephalic and atraumatic.   Eyes:      Extraocular Movements: Extraocular movements intact.      Conjunctiva/sclera: Conjunctivae normal.   Cardiovascular:      Rate and Rhythm: Normal rate.       Heart sounds: Normal heart sounds.   Pulmonary:      Effort: Pulmonary effort is normal.      Breath sounds: Rhonchi (BLL) present.   Abdominal:      General: There is no distension.      Palpations: Abdomen is soft.      Tenderness: There is no abdominal tenderness. There is no guarding.   Musculoskeletal:      Cervical back: Normal range of motion and neck supple.      Right lower leg: No edema.      Left lower leg: No edema.   Skin:     General: Skin is warm and dry.   Neurological:      Mental Status: He is alert and oriented to person, place, and time.      Cranial Nerves: No cranial nerve deficit.   Psychiatric:         Behavior: Behavior normal.         Thought Content: Thought content normal.         Results Review:  I reviewed the patient's new clinical results.  I reviewed the patient's new imaging results and agree with the interpretation.  I reviewed the patient's other test results and agree with the interpretation  I personally viewed and interpreted the patient's EKG/Telemetry data  Discussed with ED provider.    Lab Results (last 24 hours)       Procedure Component Value Units Date/Time    CBC & Differential [[id number]]  (Abnormal) Collected: 01/31/24 2240    Specimen: Blood from Arm, Left Updated: 01/31/24 2249    Narrative:      The following orders were created for panel order CBC & Differential.  Procedure                               Abnormality         Status                     ---------                               -----------         ------                     CBC Auto Differential[[id number]]        Abnormal            Final result                 Please view results for these tests on the individual orders.    Comprehensive Metabolic Panel [[id number]]  (Abnormal) Collected: 01/31/24 2240    Specimen: Blood from Arm, Left Updated: 01/31/24 2328     Glucose 94 mg/dL      BUN 19 mg/dL      Creatinine 0.87 mg/dL      Sodium 133 mmol/L      Potassium 3.9 mmol/L      Chloride 100 mmol/L       CO2 20.7 mmol/L      Calcium 8.3 mg/dL      Total Protein 5.7 g/dL      Albumin 3.4 g/dL      ALT (SGPT) 18 U/L      AST (SGOT) 30 U/L      Alkaline Phosphatase 76 U/L      Total Bilirubin 0.3 mg/dL      Globulin 2.3 gm/dL      A/G Ratio 1.5 g/dL      BUN/Creatinine Ratio 21.8     Anion Gap 12.3 mmol/L      eGFR 84.0 mL/min/1.73     Narrative:      GFR Normal >60  Chronic Kidney Disease <60  Kidney Failure <15    The GFR formula is only valid for adults with stable renal function between ages 18 and 70.    Lipase [[id number]]  (Normal) Collected: 01/31/24 2240    Specimen: Blood from Arm, Left Updated: 01/31/24 2328     Lipase 33 U/L     Lactic Acid, Plasma [[id number]]  (Normal) Collected: 01/31/24 2240    Specimen: Blood from Arm, Left Updated: 01/31/24 2316     Lactate 0.6 mmol/L     CBC Auto Differential [[id number]]  (Abnormal) Collected: 01/31/24 2240    Specimen: Blood from Arm, Left Updated: 01/31/24 2249     WBC 5.08 10*3/mm3      RBC 3.07 10*6/mm3      Hemoglobin 9.8 g/dL      Hematocrit 29.2 %      MCV 95.1 fL      MCH 31.9 pg      MCHC 33.6 g/dL      RDW 13.2 %      RDW-SD 44.8 fl      MPV 10.5 fL      Platelets 121 10*3/mm3      Neutrophil % 79.9 %      Lymphocyte % 6.3 %      Monocyte % 11.8 %      Eosinophil % 1.0 %      Basophil % 0.6 %      Immature Grans % 0.4 %      Neutrophils, Absolute 4.06 10*3/mm3      Lymphocytes, Absolute 0.32 10*3/mm3      Monocytes, Absolute 0.60 10*3/mm3      Eosinophils, Absolute 0.05 10*3/mm3      Basophils, Absolute 0.03 10*3/mm3      Immature Grans, Absolute 0.02 10*3/mm3      nRBC 0.2 /100 WBC     Procalcitonin [[id number]]  (Normal) Collected: 01/31/24 2240    Specimen: Blood from Arm, Left Updated: 01/31/24 2332     Procalcitonin 0.08 ng/mL     Narrative:      As a Marker for Sepsis (Non-Neonates):    1. <0.5 ng/mL represents a low risk of severe sepsis and/or septic shock.  2. >2 ng/mL represents a high risk of severe sepsis and/or septic shock.    As a Marker for  "Lower Respiratory Tract Infections that require antibiotic therapy:    PCT on Admission    Antibiotic Therapy       6-12 Hrs later    >0.5                Strongly Recommended  >0.25 - <0.5        Recommended   0.1 - 0.25          Discouraged              Remeasure/reassess PCT  <0.1                Strongly Discouraged     Remeasure/reassess PCT    As 28 day mortality risk marker: \"Change in Procalcitonin Result\" (>80% or <=80%) if Day 0 (or Day 1) and Day 4 values are available. Refer to http://[email or web address]cal[email or web address]    Change in PCT <=80%  A decrease of PCT levels below or equal to 80% defines a positive change in PCT test result representing a higher risk for 28-day all-cause mortality of patients diagnosed with severe sepsis for septic shock.    Change in PCT >80%  A decrease of PCT levels of more than 80% defines a negative change in PCT result representing a lower risk for 28-day all-cause mortality of patients diagnosed with severe sepsis or septic shock.       Respiratory Panel PCR w/COVID-19(SARS-CoV-2) CHELLY/MAULIK/IGOR/PAD/COR/MICHELLE In-House, NP Swab in UTM/VTM, 2 HR TAT - Swab, Nasopharynx [[id number]]  (Abnormal) Collected: 01/31/24 2256    Specimen: Swab from Nasopharynx Updated: 01/31/24 7783     ADENOVIRUS, PCR Not Detected     Coronavirus 229E Not Detected     Coronavirus HKU1 Not Detected     Coronavirus NL63 Not Detected     Coronavirus OC43 Not Detected     COVID19 Not Detected     Human Metapneumovirus Not Detected     Human Rhinovirus/Enterovirus Not Detected     Influenza A H1 2009 PCR Detected     Influenza B PCR Not Detected     Parainfluenza Virus 1 Not Detected     Parainfluenza Virus 2 Not Detected     Parainfluenza Virus 3 Not Detected     Parainfluenza Virus 4 Not Detected     RSV, PCR Not Detected     Bordetella pertussis pcr Not Detected     Bordetella parapertussis PCR Not Detected     Chlamydophila pneumoniae PCR Not Detected     Mycoplasma pneumo by PCR Not Detected    Narrative:      " In the setting of a positive respiratory panel with a viral infection PLUS a negative procalcitonin without other underlying concern for bacterial infection, consider observing off antibiotics or discontinuation of antibiotics and continue supportive care. If the respiratory panel is positive for atypical bacterial infection (Bordetella pertussis, Chlamydophila pneumoniae, or Mycoplasma pneumoniae), consider antibiotic de-escalation to target atypical bacterial infection.    Blood Culture - Blood, Arm, Right [[id number]] Collected: 01/31/24 2256    Specimen: Blood from Arm, Right Updated: 01/31/24 2302    Blood Culture - Blood, Arm, Left [[id number]] Collected: 01/31/24 2309    Specimen: Blood from Arm, Left Updated: 01/31/24 2313    Urinalysis With Microscopic If Indicated (No Culture) - Urine, Clean Catch [[id number]]  (Normal) Collected: 01/31/24 2350    Specimen: Urine, Clean Catch Updated: 02/01/24 0004     Color, UA Yellow     Appearance, UA Clear     pH, UA 5.5     Specific Gravity, UA 1.021     Glucose, UA Negative     Ketones, UA Negative     Bilirubin, UA Negative     Blood, UA Negative     Protein, UA Negative     Leuk Esterase, UA Negative     Nitrite, UA Negative     Urobilinogen, UA 0.2 E.U./dL    Narrative:      Urine microscopic not indicated.    Iron Profile [[id number]]  (Abnormal) Collected: 02/01/24 0455    Specimen: Blood Updated: 02/01/24 0606     Iron 20 mcg/dL      Iron Saturation (TSAT) 7 %      Transferrin 181 mg/dL      TIBC 270 mcg/dL     Ferritin [[id number]]  (Abnormal) Collected: 02/01/24 0455    Specimen: Blood Updated: 02/01/24 0609     Ferritin 628.00 ng/mL     Narrative:      Results may be falsely decreased if patient taking Biotin.      Basic Metabolic Panel [[id number]]  (Abnormal) Collected: 02/01/24 0455    Specimen: Blood Updated: 02/01/24 0610     Glucose 83 mg/dL      BUN 15 mg/dL      Creatinine 0.92 mg/dL      Sodium 136 mmol/L      Potassium 3.8 mmol/L      Comment: Slight  hemolysis detected by analyzer. Result may be falsely elevated.        Chloride 107 mmol/L      CO2 18.7 mmol/L      Calcium 7.9 mg/dL      BUN/Creatinine Ratio 16.3     Anion Gap 10.3 mmol/L      eGFR 81.0 mL/min/1.73     Narrative:      GFR Normal >60  Chronic Kidney Disease <60  Kidney Failure <15    The GFR formula is only valid for adults with stable renal function between ages 18 and 70.    CBC Auto Differential [[id number]]  (Abnormal) Collected: 02/01/24 0455    Specimen: Blood Updated: 02/01/24 0553     WBC 4.52 10*3/mm3      RBC 3.13 10*6/mm3      Hemoglobin 10.0 g/dL      Hematocrit 30.8 %      MCV 98.4 fL      MCH 31.9 pg      MCHC 32.5 g/dL      RDW 13.0 %      RDW-SD 46.2 fl      MPV 10.9 fL      Platelets 132 10*3/mm3      Neutrophil % 73.9 %      Lymphocyte % 12.8 %      Monocyte % 12.2 %      Eosinophil % 0.2 %      Basophil % 0.7 %      Immature Grans % 0.2 %      Neutrophils, Absolute 3.34 10*3/mm3      Lymphocytes, Absolute 0.58 10*3/mm3      Monocytes, Absolute 0.55 10*3/mm3      Eosinophils, Absolute 0.01 10*3/mm3      Basophils, Absolute 0.03 10*3/mm3      Immature Grans, Absolute 0.01 10*3/mm3      nRBC 0.0 /100 WBC             Imaging Results (Last 24 Hours)       Procedure Component Value Units Date/Time    XR Chest 1 View [[id number]] Collected: 01/31/24 2341     Updated: 01/31/24 2347    Narrative:      XR CHEST 1 VW-     Clinical: Cough     COMPARISON examination 9/8/2022     FINDINGS: There is chronic appearing pleural and parenchymal change  demonstrated at both lung bases, greater on the right than the left.  There is cardiac enlargement. No pulmonary edema is demonstrated. No  acute airspace disease within the right lung. There is vague opacity  demonstrated at the left lung base worrisome for infiltrate/atelectasis  and/or pleural fluid. PA and lateral view of the chest would be helpful  as follow-up. The remainder is unremarkable.     This report was finalized on 1/31/2024 11:43 PM  by Dr. Tim Thayer M.D on Workstation: [id number]               Results for orders placed during the hospital encounter of 01/06/23    Adult Transthoracic Echo Complete w/ Color, Spectral and Contrast if Necessary Per Protocol    Interpretation Summary    Left ventricular systolic function is normal. Left ventricular ejection fraction appears to be 61 - 65%.    Left ventricular diastolic function was normal.    Estimated right ventricular systolic pressure from tricuspid regurgitation is mildly elevated (35-45 mmHg).      SCANNED - TELEMETRY     Final Result      SCANNED - TELEMETRY     Final Result           Assessment/Plan     Active Hospital Problems    Diagnosis  POA    **Influenza A [J10.1]  Yes    CRISTAL (obstructive sleep apnea) [G47.33]  Unknown    Acute respiratory failure with hypoxia [J96.01]  Unknown    Anemia of chronic disease [D63.8]  Yes    Hyperlipidemia [E78.5]  Yes    Hypertension [I10]  Yes    Rheumatoid arthritis [M06.9]  Yes    Seizures [R56.9]  Yes    Iron deficiency anemia [D50.9]  Yes      Resolved Hospital Problems   No resolved problems to display.       Mr. Ca is a 88 y.o. former smoker with a history of hypertension, hyperlipidemia, seizures, cerebrovascular disease, rheumatoid arthritis who presents to [facility] ER with chief complaint of vomiting and fever and admitted for influenza A with subsequent acute respiratory failure with hypoxia.      Influenza A with subsequent acute respiratory failure with hypoxia: Confirmed on PCR.  Ordering Tamiflu as patient and family agrees to attempt treatment with Tamiflu knowing possible side effects include gastrointestinal illness--antiemetics available as needed.  Wean oxygen as tolerated.  ? Pleural effusion on LLL.  Ordering CXR PA /LAT.  Discontinue IVFs & monitor dietary tolerance / intake.      Iron deficiency anemia / Anemia of chronic disease: Confirmed on iron profile.  Follow-up with primary care provider for continued monitoring  hemoglobin (trend stable) and labs status iron supplement.      Hypertension:  BP acceptable acutely.  Plan to continue Lopressor per home dose regimen with hold parameters.      Rheumatoid arthritis:  Hold Plaquenil & Arava for now.      Seizures:  Keppra continued.  Seizure precautions.      CRISTAL (obstructive sleep apnea):  Nocturnal supplemental oxygen provided.    I discussed the patient's findings and my recommendations with patient and nursing staff, & Dr. Cassidy.    VTE Prophylaxis - SCDs.  Code Status - Full code.       SHARLENE Umana  [facility]  02/01/24  12:33 EST

## 2024-02-01 NOTE — ED TRIAGE NOTES
Patient to ED per EMS from home w/ reports of fever, nausea/vomiting today after eating; not feeling well since Monday. Per patient, took home COVID test which was negative. Mask placed on patient during triage.

## 2024-02-01 NOTE — PROGRESS NOTES
Clinical Pharmacy Services: Medication History    Jua nC Ca is a 88 y.o. male presenting to [facility] for Fever and chills [R50.9]  Influenza A [J10.1]  Nausea and vomiting, unspecified vomiting type [R11.2]    He  has a past medical history of Cancer, CVD (cerebrovascular disease), H/O Iron deficiency anemia, H/O Traumatic brain injury (CMS/HCC), H/O: pneumonia (04/2015), Hyperlipidemia, Hypertension, CRISTAL (obstructive sleep apnea), Premature atrial contraction, Rheumatoid arthritis, and Seizures.    Allergies as of 01/31/2024 - Reviewed 01/31/2024   Allergen Reaction Noted    Sulfadiazine Unknown - High Severity 10/15/2019       Medication information was obtained from: daughter  Pharmacy and Phone Number:     Prior to Admission Medications       Prescriptions Last Dose Informant Patient Reported? Taking?    acetaminophen (TYLENOL) 500 MG tablet 1/31/2024  No Yes    Take 2 tablets by mouth Every 6 (Six) Hours As Needed for Mild Pain  or Fever.    aspirin 81 MG EC tablet 1/31/2024  Yes Yes    Take 1 tablet by mouth Daily.    cetirizine (zyrTEC) 10 MG tablet Past Week  No Yes    Take 0.5 tablets by mouth Daily.    famotidine (PEPCID) 20 MG tablet 1/31/2024  No Yes    Take 1 tablet by mouth Daily.    hydroxychloroquine (PLAQUENIL) 200 MG tablet 1/31/2024 Self Yes Yes    Take 1 tablet by mouth Daily.    leflunomide (ARAVA) 20 MG tablet 1/31/2024  Yes Yes    Take 1 tablet by mouth Daily.    levETIRAcetam (KEPPRA) 500 MG tablet 1/31/2024 Self Yes Yes    Take 1 tablet by mouth 2 (Two) Times a Day.    levothyroxine (SYNTHROID, LEVOTHROID) 25 MCG tablet 1/31/2024  Yes Yes    Take 1 tablet by mouth Daily.    memantine (NAMENDA) 10 MG tablet 1/31/2024  Yes Yes    Take 1 tablet by mouth Daily.    metoprolol tartrate (Lopressor) 50 MG tablet 1/31/2024  No Yes    Take 0.5 tablets by mouth 2 (Two) Times a Day.    Mirabegron ER (MYRBETRIQ) 50 MG tablet sustained-release 24 hour 24 hr tablet 1/31/2024  Yes  Yes    Take 50 mg by mouth.    Multiple Vitamin (MULTI VITAMIN MENS PO) 1/31/2024 Self Yes Yes    Take 1 tablet by mouth Daily.    multivitamins-minerals (PRESERVISION AREDS 2) capsule capsule 1/31/2024  Yes Yes    tamsulosin (FLOMAX) 0.4 MG capsule 24 hr capsule 1/31/2024  No Yes    Take 1 capsule by mouth every night.    vitamin D3 125 MCG (5000 UT) capsule capsule 1/31/2024  Yes Yes    docusate sodium (COLACE) 100 MG capsule Unknown  Yes No    Take 1 capsule by mouth 2 (Two) Times a Day. PRN              Medication notes:     This medication list is complete to the best of my knowledge as of 2/1/2024    Please call if questions.    Dayana Godfrey, PharmD, BCPS  2/1/2024 08:28 EST

## 2024-02-01 NOTE — ED NOTES
".Nursing report ED to floor  Juan C Ca  88 y.o.  male    HPI :   Chief Complaint   Patient presents with    Vomiting    Fever       Admitting doctor:   Himanshu Butler MD    Admitting diagnosis:   The primary encounter diagnosis was Fever and chills. Diagnoses of Influenza A and Nausea and vomiting, unspecified vomiting type were also pertinent to this visit.    Code status:   Current Code Status       Date Active Code Status Order ID Comments User Context       2/1/2024 0108 CPR (Attempt to Resuscitate) [id number]  Rossana Lucero, APRN ED        Question Answer    Code Status (Patient has no pulse and is not breathing) CPR (Attempt to Resuscitate)    Medical Interventions (Patient has pulse or is breathing) Full Support                    Allergies:   Sulfadiazine    Isolation:   Droplet    Intake and Output    Intake/Output Summary (Last 24 hours) at 2/1/2024 0202  Last data filed at 2/1/2024 0200  Gross per 24 hour   Intake 1000 ml   Output --   Net 1000 ml       Weight:       01/31/24 2256   Weight: 71.8 kg (158 lb 3.2 oz)       Most recent vitals:   Vitals:    01/31/24 2207 01/31/24 2256 02/01/24 0031 02/01/24 0131   BP: (!) 189/83  170/90 142/79   Pulse: 73  74 75   Resp: 24  20 17   Temp: (!) 101.1 °F (38.4 °C)      TempSrc: Oral      SpO2: 93%  91% 90%   Weight:  71.8 kg (158 lb 3.2 oz)     Height:  170.2 cm (67\")         Active LDAs/IV Access:   Lines, Drains & Airways       Active LDAs       Name Placement date Placement time Site Days    Peripheral IV 02/01/24 0201 Right Antecubital 02/01/24 0201  Antecubital  less than 1                    Labs (abnormal labs have a star):   Labs Reviewed   RESPIRATORY PANEL PCR W/ COVID-19 (SARS-COV-2), NP SWAB IN UTM/VTP, 2 HR TAT - Abnormal; Notable for the following components:       Result Value    Influenza A H1 2009 PCR Detected (*)     All other components within normal limits    Narrative:     In the setting of a positive respiratory panel with a " "viral infection PLUS a negative procalcitonin without other underlying concern for bacterial infection, consider observing off antibiotics or discontinuation of antibiotics and continue supportive care. If the respiratory panel is positive for atypical bacterial infection (Bordetella pertussis, Chlamydophila pneumoniae, or Mycoplasma pneumoniae), consider antibiotic de-escalation to target atypical bacterial infection.   COMPREHENSIVE METABOLIC PANEL - Abnormal; Notable for the following components:    Sodium 133 (*)     CO2 20.7 (*)     Calcium 8.3 (*)     Total Protein 5.7 (*)     Albumin 3.4 (*)     All other components within normal limits    Narrative:     GFR Normal >60  Chronic Kidney Disease <60  Kidney Failure <15    The GFR formula is only valid for adults with stable renal function between ages 18 and 70.   CBC WITH AUTO DIFFERENTIAL - Abnormal; Notable for the following components:    RBC 3.07 (*)     Hemoglobin 9.8 (*)     Hematocrit 29.2 (*)     Platelets 121 (*)     Neutrophil % 79.9 (*)     Lymphocyte % 6.3 (*)     Lymphocytes, Absolute 0.32 (*)     All other components within normal limits   LIPASE - Normal   URINALYSIS W/ MICROSCOPIC IF INDICATED (NO CULTURE) - Normal    Narrative:     Urine microscopic not indicated.   LACTIC ACID, PLASMA - Normal   PROCALCITONIN - Normal    Narrative:     As a Marker for Sepsis (Non-Neonates):    1. <0.5 ng/mL represents a low risk of severe sepsis and/or septic shock.  2. >2 ng/mL represents a high risk of severe sepsis and/or septic shock.    As a Marker for Lower Respiratory Tract Infections that require antibiotic therapy:    PCT on Admission    Antibiotic Therapy       6-12 Hrs later    >0.5                Strongly Recommended  >0.25 - <0.5        Recommended   0.1 - 0.25          Discouraged              Remeasure/reassess PCT  <0.1                Strongly Discouraged     Remeasure/reassess PCT    As 28 day mortality risk marker: \"Change in Procalcitonin " "Result\" (>80% or <=80%) if Day 0 (or Day 1) and Day 4 values are available. Refer to http://www.[facility]-pct-calculator.com    Change in PCT <=80%  A decrease of PCT levels below or equal to 80% defines a positive change in PCT test result representing a higher risk for 28-day all-cause mortality of patients diagnosed with severe sepsis for septic shock.    Change in PCT >80%  A decrease of PCT levels of more than 80% defines a negative change in PCT result representing a lower risk for 28-day all-cause mortality of patients diagnosed with severe sepsis or septic shock.      BLOOD CULTURE   BLOOD CULTURE   RAINBOW DRAW    Narrative:     The following orders were created for panel order [place] Draw.  Procedure                               Abnormality         Status                     ---------                               -----------         ------                     Green Top (Gel)[[id number]]                                  Final result               Lavender Top[[id number]]                                     Final result               Gold Top - SST[[id number]]                                   Final result               Light Blue Top[[id number]]                                   Final result                 Please view results for these tests on the individual orders.   IRON PROFILE   FERRITIN   OCCULT BLOOD X 1, STOOL   BASIC METABOLIC PANEL   CBC WITH AUTO DIFFERENTIAL   CBC AND DIFFERENTIAL    Narrative:     The following orders were created for panel order CBC & Differential.  Procedure                               Abnormality         Status                     ---------                               -----------         ------                     CBC Auto Differential[[id number]]        Abnormal            Final result                 Please view results for these tests on the individual orders.   GREEN TOP   LAVENDER TOP   GOLD TOP - SST   LIGHT BLUE TOP       EKG:   No orders to display       Meds given " in ED:   Medications   sodium chloride 0.9 % flush 10 mL (has no administration in time range)   sodium chloride 0.9 % flush 10 mL (has no administration in time range)   sodium chloride 0.9 % flush 10 mL (has no administration in time range)   sodium chloride 0.9 % flush 10 mL (has no administration in time range)   sodium chloride 0.9 % infusion 40 mL (has no administration in time range)   sennosides-docusate (PERICOLACE) 8.6-50 MG per tablet 2 tablet (has no administration in time range)     And   polyethylene glycol (MIRALAX) packet 17 g (has no administration in time range)     And   bisacodyl (DULCOLAX) EC tablet 5 mg (has no administration in time range)     And   bisacodyl (DULCOLAX) suppository 10 mg (has no administration in time range)   nitroglycerin (NITROSTAT) SL tablet 0.4 mg (has no administration in time range)   sodium chloride 0.9 % infusion (has no administration in time range)   acetaminophen (TYLENOL) tablet 650 mg (has no administration in time range)     Or   acetaminophen (TYLENOL) 160 MG/5ML oral solution 650 mg (has no administration in time range)     Or   acetaminophen (TYLENOL) suppository 650 mg (has no administration in time range)   ondansetron (ZOFRAN) injection 4 mg (has no administration in time range)   sodium chloride 0.9 % bolus 1,000 mL (0 mL Intravenous Stopped 24 0200)   ondansetron (ZOFRAN) injection 4 mg (4 mg Intravenous Given 24 2337)       Imaging results:  No radiology results for the last day    Ambulatory status:   - up with assist    Social issues:   Social History     Socioeconomic History    Marital status:      Spouse name: Abbie    Years of education: College   Tobacco Use    Smoking status: Former     Years: 15     Types: Cigarettes     Quit date: 1987     Years since quittin.1    Smokeless tobacco: Never    Tobacco comments:     Caffeine - coffee and tea    Vaping Use    Vaping Use: Never used   Substance and Sexual Activity     Alcohol use: No    Drug use: No    Sexual activity: Yes     Partners: Female     Birth control/protection: None       NIH Stroke Scale:         Danelle Cruz RN  02/01/24 02:02 EST

## 2024-02-01 NOTE — CASE MANAGEMENT/SOCIAL WORK
Discharge Planning Assessment  [facility]     Patient Name: Juan C Ca  MRN: [id number]  Today's Date: 2/1/2024    Admit Date: 1/31/2024    Plan: Home w/ HH (pending), family to transport   Discharge Needs Assessment       Row Name 02/01/24 1006       Living Environment    People in Home child(chuck), adult;spouse    Current Living Arrangements home    Primary Care Provided by self    Provides Primary Care For no one    Family Caregiver if Needed child(chuck), adult;spouse    Able to Return to Prior Arrangements yes       Resource/Environmental Concerns    Resource/Environmental Concerns home accessibility    Home Accessibility Concerns stairs to enter home       Transition Planning    Patient/Family Anticipates Transition to home with help/services    Patient/Family Anticipated Services at Transition home health care    Transportation Anticipated family or friend will provide       Discharge Needs Assessment    Equipment Currently Used at Home cane, straight;rollator    Concerns to be Addressed discharge planning    Discharge Facility/Level of Care Needs home with home health                   Discharge Plan       Row Name 02/01/24 1007       Plan    Plan Home w/ HH (pending), family to transport    Plan Comments Patient in isolation for the flu. CCP spoke with patient over the phone who requested CCP speak with daughter. CCP spoke with bobbi's daughter Marylin over the phone; explained role, verified facesheet, and discussed dc plan. Patient uses a rollator and a cane at home. He lives with his spouse and daughter Nitza in a 2 level home with 4 steps to enter with a handrail. Patient has used [facility] in the past. He has no history of SNF. Patient's daughter states the plan is home and denies any rehab referral at this time. Daughter is requesting  referral to [facility]. She denies any DME needs. Family to transport. FRANCINE, ABEBE                  Continued Care and Services - Admitted Since 1/31/2024    Coordination  has not been started for this encounter.       Expected Discharge Date and Time       Expected Discharge Date Expected Discharge Time    Feb 3, 2024            Demographic Summary       Row Name 02/01/24 1005       General Information    Admission Type inpatient    Arrived From home    Referral Source admission list    Reason for Consult discharge planning    Preferred Language English       Contact Information    Permission Granted to Share Info With family/designee                   Functional Status       Row Name 02/01/24 1006       Functional Status    Usual Activity Tolerance good    Current Activity Tolerance good       Functional Status, IADL    Medications assistive equipment    Meal Preparation assistive equipment    Housekeeping assistive equipment    Laundry assistive equipment    Shopping assistive equipment       Mental Status    General Appearance WDL WDL                   Psychosocial    No documentation.                  Abuse/Neglect    No documentation.                  Legal    No documentation.                  Substance Abuse    No documentation.                  Patient Forms    No documentation.                     ABEBE Greer

## 2024-02-01 NOTE — PLAN OF CARE
Goal Outcome Evaluation:  Plan of Care Reviewed With: patient           Outcome Evaluation: Pt is A/OX3-4, Forgetful at time. here with flu. RA. A-FIB. Temp is trending down. IVF. Plan of care ongoing.

## 2024-02-01 NOTE — DISCHARGE PLACEMENT REQUEST
"Mohan Barcenas (88 y.o. Male)       Date of Birth   1935    Social Security Number       Address   [address] [place] [address]  [facility] [address]    Home Phone   [phone]    MRN   [id number]       Tenriism   Mosque    Marital Status                               Admission Date   1/31/24    Admission Type   Emergency    Admitting Provider   Reno Cassidy MD    Attending Provider   Reno Cassidy MD    Department, Room/Bed   [address], [address]/1       Discharge Date       Discharge Disposition       Discharge Destination                                 Attending Provider: Reno Cassidy MD    Allergies: Sulfadiazine    Isolation: Droplet   Infection: Influenza (01/31/24)   Code Status: CPR    Ht: 170.2 cm (67\")   Wt: 69.2 kg (152 lb 8 oz)    Admission Cmt: None   Principal Problem: Influenza A [J10.1]                   Active Insurance as of 1/31/2024       Primary Coverage       Payor Plan Insurance Group Employer/Plan Group    MEDICARE MEDICARE A & B        Payor Plan Address Payor Plan Phone Number Payor Plan Fax Number Effective Dates    [address] [phone]  12/1/2002 - None Entered    [facility] [id number]         Subscriber Name Subscriber Birth Date Member ID       MOHAN BARCENAS 1935 [id number]               Secondary Coverage       Payor Plan Insurance Group Employer/Plan Group    [facility] SUPP [id number]       Payor Plan Address Payor Plan Phone Number Payor Plan Fax Number Effective Dates    [address]   12/1/2016 - None Entered    [facility] [id number]         Subscriber Name Subscriber Birth Date Member ID       MOHAN BARCENAS 1935 [id number]                     Emergency Contacts        (Rel.) Home Phone Work Phone Mobile Phone    Abbie Barcenas (Spouse) [phone] -- [phone]    EDMUNDOEDWINA SWANN (Daughter) [phone] -- --    Hannah Welch (Daughter) -- -- [phone]    " Marylin handy (Daughter) -- -- [phone]

## 2024-02-02 ENCOUNTER — READMISSION MANAGEMENT (OUTPATIENT)
Dept: CALL CENTER | Facility: HOSPITAL | Age: 89
End: 2024-02-02
Payer: MEDICARE

## 2024-02-02 ENCOUNTER — HOME HEALTH ADMISSION (OUTPATIENT)
Dept: HOME HEALTH SERVICES | Facility: HOME HEALTHCARE | Age: 89
End: 2024-02-02
Payer: MEDICARE

## 2024-02-02 ENCOUNTER — DOCUMENTATION (OUTPATIENT)
Dept: HOME HEALTH SERVICES | Facility: HOME HEALTHCARE | Age: 89
End: 2024-02-02
Payer: MEDICARE

## 2024-02-02 VITALS
DIASTOLIC BLOOD PRESSURE: 91 MMHG | WEIGHT: 152.5 LBS | SYSTOLIC BLOOD PRESSURE: 162 MMHG | TEMPERATURE: 98.1 F | HEIGHT: 67 IN | BODY MASS INDEX: 23.93 KG/M2 | OXYGEN SATURATION: 98 % | RESPIRATION RATE: 18 BRPM | HEART RATE: 67 BPM

## 2024-02-02 LAB
ANION GAP SERPL CALCULATED.3IONS-SCNC: 5.9 MMOL/L (ref 5–15)
BASOPHILS # BLD AUTO: 0.02 10*3/MM3 (ref 0–0.2)
BASOPHILS NFR BLD AUTO: 0.6 % (ref 0–1.5)
BUN SERPL-MCNC: 13 MG/DL (ref 8–23)
BUN/CREAT SERPL: 16 (ref 7–25)
CALCIUM SPEC-SCNC: 7.9 MG/DL (ref 8.6–10.5)
CHLORIDE SERPL-SCNC: 110 MMOL/L (ref 98–107)
CO2 SERPL-SCNC: 23.1 MMOL/L (ref 22–29)
CREAT SERPL-MCNC: 0.81 MG/DL (ref 0.76–1.27)
DEPRECATED RDW RBC AUTO: 45.2 FL (ref 37–54)
EGFRCR SERPLBLD CKD-EPI 2021: 85.9 ML/MIN/1.73
EOSINOPHIL # BLD AUTO: 0.15 10*3/MM3 (ref 0–0.4)
EOSINOPHIL NFR BLD AUTO: 4.8 % (ref 0.3–6.2)
ERYTHROCYTE [DISTWIDTH] IN BLOOD BY AUTOMATED COUNT: 13 % (ref 12.3–15.4)
GLUCOSE SERPL-MCNC: 82 MG/DL (ref 65–99)
HCT VFR BLD AUTO: 30.6 % (ref 37.5–51)
HEMOCCULT STL QL: NEGATIVE
HGB BLD-MCNC: 9.9 G/DL (ref 13–17.7)
IMM GRANULOCYTES # BLD AUTO: 0.01 10*3/MM3 (ref 0–0.05)
IMM GRANULOCYTES NFR BLD AUTO: 0.3 % (ref 0–0.5)
LYMPHOCYTES # BLD AUTO: 0.69 10*3/MM3 (ref 0.7–3.1)
LYMPHOCYTES NFR BLD AUTO: 22 % (ref 19.6–45.3)
MCH RBC QN AUTO: 30.7 PG (ref 26.6–33)
MCHC RBC AUTO-ENTMCNC: 32.4 G/DL (ref 31.5–35.7)
MCV RBC AUTO: 94.7 FL (ref 79–97)
MONOCYTES # BLD AUTO: 0.47 10*3/MM3 (ref 0.1–0.9)
MONOCYTES NFR BLD AUTO: 15 % (ref 5–12)
NEUTROPHILS NFR BLD AUTO: 1.79 10*3/MM3 (ref 1.7–7)
NEUTROPHILS NFR BLD AUTO: 57.3 % (ref 42.7–76)
NRBC BLD AUTO-RTO: 0 /100 WBC (ref 0–0.2)
PLATELET # BLD AUTO: 126 10*3/MM3 (ref 140–450)
PMV BLD AUTO: 10.5 FL (ref 6–12)
POTASSIUM SERPL-SCNC: 3.9 MMOL/L (ref 3.5–5.2)
RBC # BLD AUTO: 3.23 10*6/MM3 (ref 4.14–5.8)
SODIUM SERPL-SCNC: 139 MMOL/L (ref 136–145)
WBC NRBC COR # BLD AUTO: 3.13 10*3/MM3 (ref 3.4–10.8)

## 2024-02-02 PROCEDURE — 80048 BASIC METABOLIC PNL TOTAL CA: CPT | Performed by: INTERNAL MEDICINE

## 2024-02-02 PROCEDURE — 97530 THERAPEUTIC ACTIVITIES: CPT

## 2024-02-02 PROCEDURE — 97166 OT EVAL MOD COMPLEX 45 MIN: CPT

## 2024-02-02 PROCEDURE — 94618 PULMONARY STRESS TESTING: CPT

## 2024-02-02 PROCEDURE — 82272 OCCULT BLD FECES 1-3 TESTS: CPT | Performed by: NURSE PRACTITIONER

## 2024-02-02 PROCEDURE — 97161 PT EVAL LOW COMPLEX 20 MIN: CPT

## 2024-02-02 PROCEDURE — 85025 COMPLETE CBC W/AUTO DIFF WBC: CPT | Performed by: INTERNAL MEDICINE

## 2024-02-02 PROCEDURE — 97535 SELF CARE MNGMENT TRAINING: CPT

## 2024-02-02 RX ORDER — OSELTAMIVIR PHOSPHATE 75 MG/1
75 CAPSULE ORAL EVERY 12 HOURS SCHEDULED
Status: DISCONTINUED | OUTPATIENT
Start: 2024-02-02 | End: 2024-02-02 | Stop reason: HOSPADM

## 2024-02-02 RX ORDER — GUAIFENESIN 600 MG/1
600 TABLET, EXTENDED RELEASE ORAL EVERY 12 HOURS SCHEDULED
Status: DISCONTINUED | OUTPATIENT
Start: 2024-02-02 | End: 2024-02-02 | Stop reason: HOSPADM

## 2024-02-02 RX ORDER — FERROUS GLUCONATE 324(38)MG
324 TABLET ORAL
Qty: 30 TABLET | Refills: 0 | Status: SHIPPED | OUTPATIENT
Start: 2024-02-02 | End: 2024-03-03

## 2024-02-02 RX ORDER — GUAIFENESIN 600 MG/1
600 TABLET, EXTENDED RELEASE ORAL EVERY 12 HOURS SCHEDULED
Qty: 10 TABLET | Refills: 0 | Status: SHIPPED | OUTPATIENT
Start: 2024-02-02 | End: 2024-02-07

## 2024-02-02 RX ORDER — OSELTAMIVIR PHOSPHATE 75 MG/1
75 CAPSULE ORAL EVERY 12 HOURS SCHEDULED
Qty: 8 CAPSULE | Refills: 0 | Status: SHIPPED | OUTPATIENT
Start: 2024-02-02 | End: 2024-02-06

## 2024-02-02 RX ADMIN — LEVETIRACETAM 500 MG: 500 TABLET, FILM COATED ORAL at 08:16

## 2024-02-02 RX ADMIN — METOPROLOL TARTRATE 25 MG: 25 TABLET, FILM COATED ORAL at 08:16

## 2024-02-02 RX ADMIN — MEMANTINE HYDROCHLORIDE 10 MG: 10 TABLET, FILM COATED ORAL at 08:15

## 2024-02-02 RX ADMIN — Medication 10 ML: at 08:56

## 2024-02-02 RX ADMIN — FAMOTIDINE 20 MG: 20 TABLET, FILM COATED ORAL at 08:16

## 2024-02-02 RX ADMIN — OSELTAMIVIR PHOSPHATE 30 MG: 30 CAPSULE ORAL at 08:15

## 2024-02-02 RX ADMIN — LEVOTHYROXINE SODIUM 25 MCG: 25 TABLET ORAL at 05:12

## 2024-02-02 RX ADMIN — ASPIRIN 81 MG: 81 TABLET, COATED ORAL at 08:16

## 2024-02-02 NOTE — DISCHARGE SUMMARY
Patient Name: Juan C Ca  : 1935  MRN: [id number]    Date of Admission: 2024  Date of Discharge:  2024  Primary Care Physician: Tej Cedillo Jr., MD      Chief Complaint:   Vomiting and Fever      Discharge Diagnoses     Active Hospital Problems    Diagnosis  POA    **Influenza A [J10.1]  Yes    CRISTAL (obstructive sleep apnea) [G47.33]  Unknown    Acute respiratory failure with hypoxia [J96.01]  Unknown    Anemia of chronic disease [D63.8]  Yes    Hyperlipidemia [E78.5]  Yes    Hypertension [I10]  Yes    Rheumatoid arthritis [M06.9]  Yes    Seizures [R56.9]  Yes    Iron deficiency anemia [D50.9]  Yes      Resolved Hospital Problems   No resolved problems to display.        Hospital Course     Mr. Ca is a 88 y.o. male with a history of HTN, HLD, CRISTAL, RA on immune modulators, seizure disorder, CVD who presented to [facility] initially complaining of vomiting & fever.  Please see the admitting history and physical for further details.  He was found to have influenza A with subsequent acute respiratory failure with hypoxia and was admitted to the hospital for further evaluation and treatment.      Feeling unwell for 2 to 3 days with fever 102.0 at home, generalized weakness, recent nausea vomiting; therefore, to [facility] ER for further evaluation and diagnosed with Influenza A by PCR. Oxygen saturation 90% on room air and Tmax 101.1.       Tamiflu initiated during hospital admission & tolerated by patient.  Walking pulse oximetry performed & patient did not qualify for supplemental oxygen.    Plaquenil & Arava held initially & recommend resume on 2/3/2024 with close follow-up PCP / rheumatologist for continued monitoring resolution of acute illness & management of rheumatoid arthritis, respectively.    Patient appears medically stable for discharge home with family & home health on 2024 & agrees with plan for follow-up as previously discussed.    Day of Discharge        Physical Exam:  Temp:  [98.1 °F (36.7 °C)-99 °F (37.2 °C)] 98.1 °F (36.7 °C)  Heart Rate:  [56-69] 61  Resp:  [18] 18  BP: (111-182)/(66-90) 182/70  Body mass index is 23.88 kg/m².    Physical Exam  Constitutional:       General: He is not in acute distress.     Appearance: He is not toxic-appearing.   HENT:      Head: Normocephalic and atraumatic.   Eyes:      Extraocular Movements: Extraocular movements intact.      Conjunctiva/sclera: Conjunctivae normal.   Cardiovascular:      Rate and Rhythm: Normal rate.      Heart sounds: Normal heart sounds.   Pulmonary:      Effort: Pulmonary effort is normal.      Breath sounds: Normal breath sounds.   Abdominal:      General: Bowel sounds are normal.      Palpations: Abdomen is soft.   Musculoskeletal:      Cervical back: Normal range of motion and neck supple.      Right lower leg: No edema.      Left lower leg: No edema.   Skin:     General: Skin is warm and dry.   Neurological:      Mental Status: He is alert and oriented to person, place, and time.      Cranial Nerves: No cranial nerve deficit.   Psychiatric:         Behavior: Behavior normal.         Thought Content: Thought content normal.         Consultants     Consult Orders (all) (From admission, onward)       Start     Ordered    02/01/24 0057  [facility] (on-call MD unless specified) Details  Once        Specialty:  Hospitalist  Provider:  (Not yet assigned)    02/01/24 0056                  Procedures     * Surgery not found *    Imaging Results (All)       Procedure Component Value Units Date/Time    XR Chest PA & Lateral [[id number]] Collected: 02/01/24 1540     Updated: 02/01/24 1546    Narrative:      CHEST: 2 VIEWS     HISTORY: Evaluate pleural effusion.     COMPARISON: Portable chest 01/31/2024, two-view chest 09/08/2022, chest  CT 06/13/2019.     FINDINGS: Heart size is within normal limits. There are small bilateral  pleural effusions and there is basilar atelectasis. Calcified  mediastinal lymph nodes  are present. There is pleural-parenchymal  scarring in the right lung apex where there are calcifications and this  does not appear changed compared with previous chest CT 06/13/2019.  There is a 6 mm nodular opacity within the left midlung just lateral to  the left hilum. There is a left perihilar infiltrate/airspace opacity at  this location on prior exam 12/31/2020 and this may represent residual  scarring. The bones are osteopenic and there is multilevel endplate spur  formation within the thoracic spine. There is anterior wedging of  thoracic spine vertebral bodies with kyphotic curvature of the thoracic  spine.       Impression:      1. Subcentimeter left midlung nodular opacity. There has been previous  airspace opacity/infiltrate at this location on exam 12/31/2020 and this  could represent a small area of residual scarring though is  indeterminate on this exam. Follow-up with chest CT could be performed  to ensure the absence of a nodule at this location.  2. Small bilateral pleural effusions with adjacent basilar atelectasis.  Right apical pleural-parenchymal scarring with calcifications, similar  to previous chest CT.  3. Cardiomegaly.  4. Osteopenia. Kyphotic curvature of the thoracic spine with multilevel  degenerative disc disease.     This report was finalized on 2/1/2024 3:43 PM by Dr. Ranjit Frank M.D on Workstation: [id number]       XR Chest 1 View [[id number]] Collected: 01/31/24 2341     Updated: 01/31/24 2347    Narrative:      XR CHEST 1 VW-     Clinical: Cough     COMPARISON examination 9/8/2022     FINDINGS: There is chronic appearing pleural and parenchymal change  demonstrated at both lung bases, greater on the right than the left.  There is cardiac enlargement. No pulmonary edema is demonstrated. No  acute airspace disease within the right lung. There is vague opacity  demonstrated at the left lung base worrisome for infiltrate/atelectasis  and/or pleural fluid. PA and lateral view of  "the chest would be helpful  as follow-up. The remainder is unremarkable.     This report was finalized on 1/31/2024 11:43 PM by Dr. Tim Thayer M.D on Workstation: [id number]             Results for orders placed during the hospital encounter of 01/10/20    Duplex venous lower extremity bilateral CAR    Interpretation Summary  · Normal bilateral lower extremity venous duplex scan.    Results for orders placed during the hospital encounter of 01/06/23    Adult Transthoracic Echo Complete w/ Color, Spectral and Contrast if Necessary Per Protocol    Interpretation Summary    Left ventricular systolic function is normal. Left ventricular ejection fraction appears to be 61 - 65%.    Left ventricular diastolic function was normal.    Estimated right ventricular systolic pressure from tricuspid regurgitation is mildly elevated (35-45 mmHg).    Pertinent Labs     Results from last 7 days   Lab Units 02/02/24  0450 02/01/24  0455 01/31/24  2240   WBC 10*3/mm3 3.13* 4.52 5.08   HEMOGLOBIN g/dL 9.9* 10.0* 9.8*   PLATELETS 10*3/mm3 126* 132* 121*     Results from last 7 days   Lab Units 02/02/24  0450 02/01/24  0455 01/31/24  2240   SODIUM mmol/L 139 136 133*   POTASSIUM mmol/L 3.9 3.8 3.9   CHLORIDE mmol/L 110* 107 100   CO2 mmol/L 23.1 18.7* 20.7*   BUN mg/dL 13 15 19   CREATININE mg/dL 0.81 0.92 0.87   GLUCOSE mg/dL 82 83 94   EGFR mL/min/1.73 85.9 81.0 84.0     Results from last 7 days   Lab Units 01/31/24  2240   ALBUMIN g/dL 3.4*   BILIRUBIN mg/dL 0.3   ALK PHOS U/L 76   AST (SGOT) U/L 30   ALT (SGPT) U/L 18     Results from last 7 days   Lab Units 02/02/24  0450 02/01/24  0455 01/31/24  2240   CALCIUM mg/dL 7.9* 7.9* 8.3*   ALBUMIN g/dL  --   --  3.4*     Results from last 7 days   Lab Units 01/31/24  2240   LIPASE U/L 33             Invalid input(s): \"LDLCALC\"  Results from last 7 days   Lab Units 01/31/24  2309 01/31/24  2256   BLOODCX  No growth at 24 hours No growth at 24 hours     Results from last 7 days   Lab " Units 01/31/24  2255   COVID19  Not Detected       Test Results Pending at Discharge     Pending Labs       Order Current Status    Blood Culture - Blood, Arm, Left Preliminary result    Blood Culture - Blood, Arm, Right Preliminary result            Discharge Details        Discharge Medications        New Medications        Instructions Start Date   ferrous gluconate 324 MG tablet  Commonly known as: FERGON   324 mg, Oral, Daily With Breakfast      guaiFENesin 600 MG 12 hr tablet  Commonly known as: MUCINEX   600 mg, Oral, Every 12 Hours Scheduled      oseltamivir 75 MG capsule  Commonly known as: TAMIFLU   75 mg, Oral, Every 12 Hours Scheduled             Continue These Medications        Instructions Start Date   acetaminophen 500 MG tablet  Commonly known as: TYLENOL   1,000 mg, Oral, Every 6 Hours PRN      aspirin 81 MG EC tablet   81 mg, Oral, Daily      cetirizine 10 MG tablet  Commonly known as: zyrTEC   5 mg, Oral, Daily      docusate sodium 100 MG capsule  Commonly known as: COLACE   100 mg, Oral, 2 Times Daily, PRN      Famotidine Maximum Strength 20 MG tablet  Generic drug: famotidine   20 mg, Oral, Daily      hydroxychloroquine 200 MG tablet  Commonly known as: PLAQUENIL   200 mg, Oral, Daily      leflunomide 20 MG tablet  Commonly known as: ARAVA   1 tablet, Oral, Daily      levETIRAcetam 500 MG tablet  Commonly known as: KEPPRA   500 mg, Oral, 2 Times Daily      levothyroxine 25 MCG tablet  Commonly known as: SYNTHROID, LEVOTHROID   1 tablet, Oral, Daily      memantine 10 MG tablet  Commonly known as: NAMENDA   1 tablet, Oral, Daily      metoprolol tartrate 50 MG tablet  Commonly known as: Lopressor   25 mg, Oral, 2 Times Daily      Mirabegron ER 50 MG tablet sustained-release 24 hour 24 hr tablet  Commonly known as: MYRBETRIQ   1 tablet, Oral      multivitamin tablet tablet  Commonly known as: THERAGRAN   1 tablet, Oral, Daily      multivitamins-minerals capsule capsule   No dose, route, or  frequency recorded.      tamsulosin 0.4 MG capsule 24 hr capsule  Commonly known as: FLOMAX   Take 1 capsule by mouth every night.      vitamin D3 125 MCG (5000 UT) capsule capsule   No dose, route, or frequency recorded.               Allergies   Allergen Reactions    Sulfadiazine Unknown - High Severity     fever  Other reaction(s): Fever       Discharge Disposition:  Home-Health Care Svc      Discharge Diet:  Diet Order   Procedures    Diet: Cardiac Diets; Healthy Heart (2-3 Na+); Texture: Regular Texture (IDDSI 7); Fluid Consistency: Thin (IDDSI 0)       Discharge Activity:   Activity Instructions       Activity as Tolerated              CODE STATUS:    Code Status and Medical Interventions:   Ordered at: 02/01/24 0108     Code Status (Patient has no pulse and is not breathing):    CPR (Attempt to Resuscitate)     Medical Interventions (Patient has pulse or is breathing):    Full Support       Future Appointments   Date Time Provider Department Center   5/10/2024  1:00 PM Vibha Mendiola APRN [facility]     Additional Instructions for the Follow-ups that You Need to Schedule       Ambulatory Referral to Home Health   As directed      Face to Face Visit Date: 2/2/2024   Follow-up provider for Plan of Care?: I treated the patient in an acute care facility and will not continue treatment after discharge.   Follow-up provider: GINA HOWARD JR. [[id number]]   Reason/Clinical Findings: influenza A   Describe mobility limitations that make leaving home difficult: influenza A   Nursing/Therapeutic Services Requested: Physical Therapy Skilled Nursing   Skilled nursing orders: Medication education   PT orders: Strengthening   Frequency: 1 Week 1               Contact information for follow-up providers       Gina Howard Jr., MD .    Specialty: Internal Medicine  Contact information:  [address] [place] [address]  [phone]                       Contact information for  after-discharge care       Home Medical Care       [facility] .    Services: Home Health Services, Home Nursing, Home Rehabilitation  Contact information:  [address]  [facility] [address] [phone]                                   Additional Instructions for the Follow-ups that You Need to Schedule       Ambulatory Referral to Home Health   As directed      Face to Face Visit Date: 2/2/2024   Follow-up provider for Plan of Care?: I treated the patient in an acute care facility and will not continue treatment after discharge.   Follow-up provider: GINA HOWARD JR. [[id number]]   Reason/Clinical Findings: influenza A   Describe mobility limitations that make leaving home difficult: influenza A   Nursing/Therapeutic Services Requested: Physical Therapy Skilled Nursing   Skilled nursing orders: Medication education   PT orders: Strengthening   Frequency: 1 Week 1            Time Spent on Discharge:  Greater than 30 minutes      SHARLENE Umana  [facility]  02/02/24  11:07 EST

## 2024-02-02 NOTE — THERAPY EVALUATION
Patient Name: Juan C Ca  : 1935    MRN: [id number]                              Today's Date: 2024       Admit Date: 2024    Visit Dx:     ICD-10-CM ICD-9-CM   1. Fever and chills  R50.9 780.60   2. Influenza A  J10.1 487.1   3. Nausea and vomiting, unspecified vomiting type  R11.2 787.01   4. Generalized weakness  R53.1 780.79     Patient Active Problem List   Diagnosis    Iron deficiency anemia    Adverse effect of iron    Febrile illness, acute    Hyperlipidemia    Hypertension    Rheumatoid arthritis    Seizures    Drug induced fever    Hypokalemia    Anemia of chronic disease    Urinary frequency    Pneumonia of left lung due to infectious organism    COVID-19 virus detected    Pneumonia due to COVID-19 virus    Dehydration    Shingles    Elevated liver function tests    Anemia, chronic disease    PVC (premature ventricular contraction)    Influenza A    CRISTAL (obstructive sleep apnea)    Acute respiratory failure with hypoxia     Past Medical History:   Diagnosis Date    Cancer     skin cancer    CVD (cerebrovascular disease)     with remote infarcts per CT of head.    H/O Iron deficiency anemia     H/O Traumatic brain injury (CMS/HCC)     H/O: pneumonia 2015    Hyperlipidemia     Hypertension     CRISTAL (obstructive sleep apnea)     Premature atrial contraction     Rheumatoid arthritis     Seizures     Started in 1960s.     Past Surgical History:   Procedure Laterality Date    APPENDECTOMY  1950    CATARACT EXTRACTION      COLONOSCOPY N/A 2017    Procedure: COLONOSCOPY TO CECUM AND TI WITH APC CAUTERY OF RIGHT COLON AVM ;  Surgeon: Lucio Stein MD;  Location: [facility] ENDOSCOPY;  Service:     ENDOSCOPY N/A 2017    Procedure: ESOPHAGOGASTRODUODENOSCOPY with BX ;  Surgeon: Lucio Stein MD;  Location: [facility] ENDOSCOPY;  Service:       General Information       Row Name 24 1201          OT Time and Intention    Document Type evaluation  -PP     Mode of Treatment  individual therapy;occupational therapy  -PP       Row Name 02/02/24 1201          General Information    Patient Profile Reviewed yes  -PP     Prior Level of Function independent:;min assist:  Pt recieves assist from his daughter as needed  -PP     Existing Precautions/Restrictions fall  -PP     Barriers to Rehab none identified  Some processing issues from past TBI per daughter report.  -PP       Row Name 02/02/24 1201          Living Environment    People in Home child(chuck), adult;spouse  -PP       Row Name 02/02/24 1201          Home Main Entrance    Number of Stairs, Main Entrance four  -PP     Stair Railings, Main Entrance railings safe and in good condition  -PP       Row Name 02/02/24 1201          Cognition    Orientation Status (Cognition) oriented x 3  -PP       Row Name 02/02/24 1201          Safety Issues, Functional Mobility    Impairments Affecting Function (Mobility) balance;strength;endurance/activity tolerance  -PP     Comment, Safety Issues/Impairments (Mobility) gait belt and non skid socks worn for safety  -PP               User Key  (r) = Recorded By, (t) = Taken By, (c) = Cosigned By      Initials Name Provider Type    PP Nirav Dixon OT Occupational Therapist                     Mobility/ADL's       Row Name 02/02/24 1213          Bed Mobility    Comment, (Bed Mobility) Pt UIC at session start and end  -PP       Row Name 02/02/24 1213          Transfers    Transfers sit-stand transfer;toilet transfer  -PP     Comment, (Transfers) Pt CGA for most fxnl xfers using Rwx for stability  -PP       Row Name 02/02/24 1213          Sit-Stand Transfer    Sit-Stand [place] (Transfers) contact guard;verbal cues;minimum assist (75% patient effort)  -PP     Assistive Device (Sit-Stand Transfers) walker, front-wheeled  -PP     Comment, (Sit-Stand Transfer) Vcs for hand placement, Min A for 1x STS from commode d/t lower height but CGA for 1x STS from 3-in-1  -PP       Row Name 02/02/24 1213           Toilet Transfer    Type (Toilet Transfer) sit-stand;stand-sit  -PP     [place] Level (Toilet Transfer) contact guard  -PP     Assistive Device (Toilet Transfer) commode, 3-in-1;commode;grab bars/safety frame;walker, front-wheeled  -PP     Comment, (Toilet Transfer) --  -PP       Row Name 02/02/24 1213          Functional Mobility    Functional Mobility- Ind. Level contact guard assist;verbal cues required  -PP     Functional Mobility- Device walker, front-wheeled  -PP     Functional Mobility- Comment Pt able to ambulate around room for simulation of household dis.  -PP       Row Name 02/02/24 1213          Activities of Daily Living    BADL Assessment/Intervention lower body dressing;grooming  -PP       Row Name 02/02/24 1213          Lower Body Dressing Assessment/Training    [place] Level (Lower Body Dressing) doff;don;socks;minimum assist (75% patient effort)  -PP     Position (Lower Body Dressing) supported sitting  -PP       Row Name 02/02/24 1213          Grooming Assessment/Training    [place] Level (Grooming) grooming skills;hair care, combing/brushing;oral care regimen;wash face, hands;set up  -PP     Position (Grooming) sink side;supported standing  -PP     Comment, (Grooming) Pt tolerated standing 5+ mins during G&H  -PP       Row Name 02/02/24 1213          Toileting Assessment/Training    Comment, (Toileting) Pt denies needing to void  -PP               User Key  (r) = Recorded By, (t) = Taken By, (c) = Cosigned By      Initials Name Provider Type    PP Nirav Dixon OT Occupational Therapist                   Obj/Interventions       Row Name 02/02/24 1219          Sensory Assessment (Somatosensory)    Sensory Assessment (Somatosensory) UE sensation intact  -PP       Row Name 02/02/24 1219          Vision Assessment/Intervention    Visual Impairment/Limitations WFL;corrective lenses full-time  -PP       Row Name 02/02/24 1219          Range of Motion Comprehensive    General  Range of Motion bilateral upper extremity ROM WFL  -PP       Row Name 02/02/24 1219          Strength Comprehensive (MMT)    General Manual Muscle Testing (MMT) Assessment other (see comments)  -PP     Comment, General Manual Muscle Testing (MMT) Assessment Generalized BUE weakness noted but grossly 4/5  -PP       Row Name 02/02/24 1219          Balance    Balance Assessment sitting static balance;sitting dynamic balance;sit to stand dynamic balance;standing static balance;standing dynamic balance  -PP     Static Sitting Balance supervision  -PP     Dynamic Sitting Balance standby assist  -PP     Position, Sitting Balance sitting in chair  -PP     Sit to Stand Dynamic Balance contact guard;verbal cues  -PP     Static Standing Balance contact guard  -PP     Dynamic Standing Balance contact guard  -PP     Position/Device Used, Standing Balance supported;walker, front-wheeled  -PP     Balance Interventions sitting;standing;static;dynamic;occupation based/functional task  -PP     Comment, Balance Pt stood for 5+ mins unsupported during G&H at sink, no LOB noted  -PP               User Key  (r) = Recorded By, (t) = Taken By, (c) = Cosigned By      Initials Name Provider Type    PP Nirav Dixon, OT Occupational Therapist                   Goals/Plan       Row Name 02/02/24 1229          Bed Mobility Goal 1 (OT)    Activity/Assistive Device (Bed Mobility Goal 1, OT) bed mobility activities, all  -PP     [place] Level/Cues Needed (Bed Mobility Goal 1, OT) modified independence  -PP     Time Frame (Bed Mobility Goal 1, OT) short term goal (STG);2 weeks  -PP     Progress/Outcomes (Bed Mobility Goal 1, OT) goal ongoing  -PP       Row Name 02/02/24 1229          Transfer Goal 1 (OT)    Activity/Assistive Device (Transfer Goal 1, OT) transfers, all  -PP     [place] Level/Cues Needed (Transfer Goal 1, OT) modified independence  -PP     Time Frame (Transfer Goal 1, OT) short term goal (STG);2 weeks  -PP      Strategies/Barriers (Transfers Goal 1, OT) using least restrictive AD as needed and trained  -PP     Progress/Outcome (Transfer Goal 1, OT) goal ongoing  -PP       Row Name 02/02/24 1229          Dressing Goal 1 (OT)    Activity/Device (Dressing Goal 1, OT) dressing skills, all  -PP     [place]/Cues Needed (Dressing Goal 1, OT) modified independence  -PP     Time Frame (Dressing Goal 1, OT) short term goal (STG);2 weeks  -PP     Progress/Outcome (Dressing Goal 1, OT) goal ongoing  -PP       Row Name 02/02/24 1229          Toileting Goal 1 (OT)    Activity/Device (Toileting Goal 1, OT) toileting skills, all;adjust/manage clothing;perform perineal hygiene;commode  -PP     [place] Level/Cues Needed (Toileting Goal 1, OT) modified independence  -PP     Time Frame (Toileting Goal 1, OT) short term goal (STG);2 weeks  -PP     Progress/Outcome (Toileting Goal 1, OT) goal ongoing  -PP       Row Name 02/02/24 1229          Problem Specific Goal 1 (OT)    Problem Specific Goal 1 (OT) Pt and family will demo safe technqiues with ADls, transfers, HEP and AE prior to d/c home.  -PP     Time Frame (Problem Specific Goal 1, OT) short term goal (STG);2 weeks  -PP       Row Name 02/02/24 1229          Therapy Assessment/Plan (OT)    Planned Therapy Interventions (OT) activity tolerance training;BADL retraining;functional balance retraining;patient/caregiver education/training;ROM/therapeutic exercise;strengthening exercise;transfer/mobility retraining  -PP               User Key  (r) = Recorded By, (t) = Taken By, (c) = Cosigned By      Initials Name Provider Type    PP Nirav Dixon OT Occupational Therapist                   Clinical Impression       Row Name 02/02/24 1223          Pain Assessment    Pretreatment Pain Rating 0/10 - no pain  -PP     Posttreatment Pain Rating 0/10 - no pain  -PP     Pre/Posttreatment Pain Comment slight fatigue noted at session end  -PP     Pain Intervention(s) Rest  -PP       Row  Name 02/02/24 1223          Plan of Care Review    Plan of Care Reviewed With patient;daughter  -PP     Progress improving  -PP     Outcome Evaluation Juan C Ca is a 88 y.o. male who presents to the ED c/o fever, nausea, vomiting and chills and Dx w/ Influenza A. (-) Covid 19. Pt lives w/ his wife and adult daughter in multi-level home, 4 GRADY and (I) at baseline. However, pt receives some assist from daughter as needed. Pt observed in chair w/ daughter present at bedside. He was pleasantly, slighty [place], A&O x3, and agreeable to OT Eval. Pt CGA for STS from chair and fxnl mob around room using Rwx. He was CGA mostly for all fxnl xfers but Min A for STS from commode d/t lower height using grab bar. He was Set Up for all G&H standing unsupported at sink, CGA-SBA for bal. Pt reports having raised toilet seat at home but does not perfer to use it. Pt rec to have grab bar installed at home since spacing is limited. Pt anticipated to DC home w/ assist and HHOT for additional training in ADLs and fxnl xfers to improve safety and (I) in DC setting. OT will continue to follow while here.  -PP       Row Name 02/02/24 1228          Therapy Assessment/Plan (OT)    Patient/Family Therapy Goal Statement (OT) To return home w/ increased safety and (I) w/ ADLs and fxnl mob.  -PP     Rehab Potential (OT) good, to achieve stated therapy goals  -PP     Criteria for Skilled Therapeutic Interventions Met (OT) yes;skilled treatment is necessary  -PP     Therapy Frequency (OT) 2 times/wk  -PP       Row Name 02/02/24 1223          Therapy Plan Review/Discharge Plan (OT)    Equipment Needs Upon Discharge (OT) shower chair;walker, rolling;commode chair;other (see comments)  3-in-1 vs. grab bars  -PP     Anticipated Discharge Disposition (OT) home with assist;home with home health  -PP       Row Name 02/02/24 1223          Vital Signs    Pre SpO2 (%) 100  -PP     O2 Delivery Pre Treatment room air  -PP     Pre Patient Position Sitting   -PP     Intra Patient Position Standing  -PP     Post Patient Position Sitting  -PP       Row Name 02/02/24 1223          Positioning and Restraints    Pre-Treatment Position sitting in chair/recliner  -PP     Post Treatment Position chair  -PP     In Chair notified nsg;reclined;call light within reach;encouraged to call for assist;exit alarm on;with family/caregiver  -PP               User Key  (r) = Recorded By, (t) = Taken By, (c) = Cosigned By      Initials Name Provider Type    PP Nirav Dixon, OT Occupational Therapist                   Outcome Measures       Row Name 02/02/24 1229          How much help from another is currently needed...    Putting on and taking off regular lower body clothing? 3  -PP     Bathing (including washing, rinsing, and drying) 3  -PP     Toileting (which includes using toilet bed pan or urinal) 3  -PP     Putting on and taking off regular upper body clothing 3  -PP     Taking care of personal grooming (such as brushing teeth) 3  -PP     Eating meals 4  -PP     AM-PAC 6 Clicks Score (OT) 19  -PP       Row Name 02/02/24 1040          How much help from another person do you currently need...    Turning from your back to your side while in flat bed without using bedrails? 3  -SM     Moving from lying on back to sitting on the side of a flat bed without bedrails? 3  -SM     Moving to and from a bed to a chair (including a wheelchair)? 3  -SM     Standing up from a chair using your arms (e.g., wheelchair, bedside chair)? 3  -SM     Climbing 3-5 steps with a railing? 2  -SM     To walk in hospital room? 3  -SM     AM-PAC 6 Clicks Score (PT) 17  -SM     Highest Level of Mobility Goal 5 --> Static standing  -SM       Row Name 02/02/24 1229 02/02/24 1040       Functional Assessment    Outcome Measure Options AM-PAC 6 Clicks Daily Activity (OT)  -PP AM-PAC 6 Clicks Basic Mobility (PT)  -SM              User Key  (r) = Recorded By, (t) = Taken By, (c) = Cosigned By      Initials Name  Provider Type    Kary Ribeiro, PT Physical Therapist    Nirav Reagan, OT Occupational Therapist                    Occupational Therapy Education       Title: PT OT SLP Therapies (In Progress)       Topic: Occupational Therapy (In Progress)       Point: ADL training (Done)       Description:   Instruct learner(s) on proper safety adaptation and remediation techniques during self care or transfers.   Instruct in proper use of assistive devices.                  Learning Progress Summary             Patient Acceptance, E, VU by PP at 2/2/2024 1230    Comment: Pt Ed on OT role, DC planning, trialing AD for home and safe commode xfer tech in prep for DC.   Family Acceptance, E, VU by PP at 2/2/2024 1230    Comment: Pt Ed on OT role, DC planning, trialing AD for home and safe commode xfer tech in prep for DC.                         Point: Home exercise program (Not Started)       Description:   Instruct learner(s) on appropriate technique for monitoring, assisting and/or progressing therapeutic exercises/activities.                  Learner Progress:  Not documented in this visit.              Point: Precautions (Done)       Description:   Instruct learner(s) on prescribed precautions during self-care and functional transfers.                  Learning Progress Summary             Patient Acceptance, E, VU by PP at 2/2/2024 1230    Comment: Pt Ed on OT role, DC planning, trialing AD for home and safe commode xfer tech in prep for DC.   Family Acceptance, E, VU by PP at 2/2/2024 1230    Comment: Pt Ed on OT role, DC planning, trialing AD for home and safe commode xfer tech in prep for DC.                         Point: Body mechanics (Not Started)       Description:   Instruct learner(s) on proper positioning and spine alignment during self-care, functional mobility activities and/or exercises.                  Learner Progress:  Not documented in this visit.                              User Key        Initials Effective Dates Name Provider Type Discipline    PP 06/09/23 -  Nirav Dixon, FERNANDO Occupational Therapist OT                  OT Recommendation and Plan  Planned Therapy Interventions (OT): activity tolerance training, BADL retraining, functional balance retraining, patient/caregiver education/training, ROM/therapeutic exercise, strengthening exercise, transfer/mobility retraining  Therapy Frequency (OT): 2 times/wk  Plan of Care Review  Plan of Care Reviewed With: patient, daughter  Progress: improving  Outcome Evaluation: Juan C Ca is a 88 y.o. male who presents to the ED c/o fever, nausea, vomiting and chills and Dx w/ Influenza A. (-) Covid 19. Pt lives w/ his wife and adult daughter in multi-level home, 4 GRADY and (I) at baseline. However, pt receives some assist from daughter as needed. Pt observed in chair w/ daughter present at bedside. He was pleasantly, slighty [place], A&O x3, and agreeable to OT Eval. Pt CGA for STS from chair and fxnl mob around room using Rwx. He was CGA mostly for all fxnl xfers but Min A for STS from commode d/t lower height using grab bar. He was Set Up for all G&H standing unsupported at sink, CGA-SBA for bal. Pt reports having raised toilet seat at home but does not perfer to use it. Pt rec to have grab bar installed at home since spacing is limited. Pt anticipated to DC home w/ assist and HHOT for additional training in ADLs and fxnl xfers to improve safety and (I) in DC setting. OT will continue to follow while here.     Time Calculation:   Evaluation Complexity (OT)  Review Occupational Profile/Medical/Therapy History Complexity: expanded/moderate complexity  Assessment, Occupational Performance/Identification of Deficit Complexity: 3-5 performance deficits  Clinical Decision Making Complexity (OT): detailed assessment/moderate complexity  Overall Complexity of Evaluation (OT): moderate complexity     Time Calculation- OT       Row Name 02/02/24 0441              Time Calculation- OT    OT Start Time 1050  -PP      OT Stop Time 1122  -PP      OT Time Calculation (min) 32 min  -PP      Total Timed Code Minutes- OT 23 minute(s)  -PP      OT Received On 02/02/24  -PP      OT - Next Appointment 02/05/24  -PP      OT Goal Re-Cert Due Date 02/16/24  -PP         Timed Charges    09733 - OT Therapeutic Activity Minutes 10  -PP      62949 - OT Self Care/Mgmt Minutes 13  -PP         Untimed Charges    OT Eval/Re-eval Minutes 9  -PP         Total Minutes    Timed Charges Total Minutes 23  -PP      Untimed Charges Total Minutes 9  -PP       Total Minutes 32  -PP                User Key  (r) = Recorded By, (t) = Taken By, (c) = Cosigned By      Initials Name Provider Type    PP Nirav Dixon, OT Occupational Therapist                  Therapy Charges for Today       Code Description Service Date Service Provider Modifiers Qty    20131854022 HC OT THERAPEUTIC ACT EA 15 MIN 2/2/2024 MinterJose prattia, OT GO 1    05427946523 HC OT SELF CARE/MGMT/TRAIN EA 15 MIN 2/2/2024 DestinyJose prattia, OT GO 1    48578119673 HC OT EVAL MOD COMPLEXITY 2 2/2/2024 MinterJazz prattshia, OT GO 1                 Nirav Dixon, OT  2/2/2024

## 2024-02-02 NOTE — PLAN OF CARE
Goal Outcome Evaluation:  Plan of Care Reviewed With: patient           Outcome Evaluation: Patient is an 88 y.o male who presented to [facility] with reports of fever and vomiting. Patient AOx3 though moments of confusion noted. Patients daughter at bedside. Patient lives at home with his spouse and daughter with 4 GRADY. Patient is mostly independent at baseline but receives assistance from family as needed. Patient has a cane and rollator and walker at home available for use. Patient performed STS from chair with rwx and CGA. Patient ambulated 100ft with rwx and CGA. Gait slow and shuffled with VCs to keep feet inside rwx at times. No overt LOB noted. Patient returned to sitting in chair at end of session. Encouraged patient to use rwx upon return home. Patient planning to d/c home today with family assist and HHPT follow up. Acute PT will continue to monitor for any mobility needs.      Anticipated Discharge Disposition (PT): home with assist, home with home health

## 2024-02-02 NOTE — PLAN OF CARE
Problem: Adult Inpatient Plan of Care  Goal: Plan of Care Review  Outcome: Ongoing, Progressing  Flowsheets (Taken 2/2/2024 0459)  Progress: improving  Plan of Care Reviewed With: patient  Outcome Evaluation: A&Ox3-4 forgetful at times, 3L during the night, patient does not have cpap with him, NS with freq PVC's on the monitor, encouraged turns frequently, possible dc today after iron infusion, plan of care continues.      Problem: Fall Injury Risk  Goal: Absence of Fall and Fall-Related Injury  Outcome: Ongoing, Progressing     Problem: Fall Injury Risk  Goal: Absence of Fall and Fall-Related Injury  Intervention: Identify and Manage Contributors  Recent Flowsheet Documentation  Taken 2/1/2024 2000 by Chely Aguila RN  Medication Review/Management: medications reviewed     Problem: Fall Injury Risk  Goal: Absence of Fall and Fall-Related Injury  Intervention: Promote Injury-Free Environment  Recent Flowsheet Documentation  Taken 2/2/2024 0400 by Chely Aguila RN  Safety Promotion/Fall Prevention:   fall prevention program maintained   safety round/check completed   room organization consistent  Taken 2/2/2024 0200 by Chely Aguila RN  Safety Promotion/Fall Prevention:   fall prevention program maintained   safety round/check completed   room organization consistent  Taken 2/2/2024 0000 by Chely Aguila RN  Safety Promotion/Fall Prevention:   fall prevention program maintained   room organization consistent   safety round/check completed  Taken 2/1/2024 2200 by Chely Aguila, RN  Safety Promotion/Fall Prevention:   fall prevention program maintained   room organization consistent   safety round/check completed  Taken 2/1/2024 2000 by Chely Aguila RN  Safety Promotion/Fall Prevention:   fall prevention program maintained   room organization consistent   safety round/check completed

## 2024-02-02 NOTE — PROGRESS NOTES
Exercise Oximetry    Patient Name:Juan C Ca   MRN: [id number]   Date: 02/02/24             ROOM AIR BASELINE   SpO2% 97   Heart Rate 62   Blood Pressure      EXERCISE ON ROOM AIR SpO2% EXERCISE ON O2 @ LPM SpO2%   1 MINUTE 95 1 MINUTE    2 MINUTES 94 2 MINUTES    3 MINUTES 92 3 MINUTES    4 MINUTES 92 4 MINUTES    5 MINUTES 91 5 MINUTES    6 MINUTES 91 6 MINUTES               Distance Walked   Distance Walked   Dyspnea (Emre Scale)   Dyspnea (Emre Scale)   Fatigue (Emre Scale)   Fatigue (Emre Scale)   SpO2% Post Exercise  96 SpO2% Post Exercise   HR Post Exercise  76 HR Post Exercise   Time to Recovery  3 mins Time to Recovery     Comments: Pt. Used a walker to walk up and down the thacker. Pt. Stated that he was a little SOA but this is his baseline. No distress noted. Pt. Placed back in bedside chair.

## 2024-02-02 NOTE — CASE MANAGEMENT/SOCIAL WORK
Continued Stay Note  [facility]     Patient Name: Juan C Ca  MRN: [id number]  Today's Date: 2/2/2024    Admit Date: 1/31/2024    Plan: Home with family, [facility]    Discharge Plan       Row Name 02/02/24 1142       Plan    Plan Home with family, [facility]     Patient/Family in Agreement with Plan yes    Plan Comments [facility]. DC orders noted. DC plan is home with family, [facility] . [facility]ARZ[facility]/CCP    Final Discharge Disposition Code 06 - home with home health care    Final Note Home with family, [facility] . No additional CCP needs.                   Discharge Codes    No documentation.                 Expected Discharge Date and Time       Expected Discharge Date Expected Discharge Time    Feb 2, 2024               Nancy Nam RN

## 2024-02-02 NOTE — PLAN OF CARE
Goal Outcome Evaluation:  Plan of Care Reviewed With: patient, daughter        Progress: improving  Outcome Evaluation: Juan C Ca is a 88 y.o. male who presents to the ED c/o fever, nausea, vomiting and chills and Dx w/ Influenza A. (-) Covid 19. Pt lives w/ his wife and adult daughter in multi-level home, 4 GRADY and (I) at baseline. However, pt receives some assist from daughter as needed. Pt observed in chair w/ daughter present at bedside. He was pleasantly, slighty Koi, A&O x3, and agreeable to OT Eval. Pt CGA for STS from chair and fxnl mob around room using Rwx. He was CGA mostly for all fxnl xfers but Min A for STS from commode d/t lower height using grab bar. He was Set Up for all G&H standing unsupported at sink, CGA-SBA for bal. Pt reports having raised toilet seat at home but does not perfer to use it. Pt rec to have grab bar installed at home since spacing is limited. Pt anticipated to DC home w/ assist and HHOT for additional training in ADLs and fxnl xfers to improve safety and (I) in DC setting. OT will continue to follow while here.      Anticipated Discharge Disposition (OT): home with assist, home with home health

## 2024-02-02 NOTE — THERAPY EVALUATION
Patient Name: Juan C Ca  : 1935    MRN: [id number]                              Today's Date: 2024       Admit Date: 2024    Visit Dx:     ICD-10-CM ICD-9-CM   1. Fever and chills  R50.9 780.60   2. Influenza A  J10.1 487.1   3. Nausea and vomiting, unspecified vomiting type  R11.2 787.01     Patient Active Problem List   Diagnosis    Iron deficiency anemia    Adverse effect of iron    Febrile illness, acute    Hyperlipidemia    Hypertension    Rheumatoid arthritis    Seizures    Drug induced fever    Hypokalemia    Anemia of chronic disease    Urinary frequency    Pneumonia of left lung due to infectious organism    COVID-19 virus detected    Pneumonia due to COVID-19 virus    Dehydration    Shingles    Elevated liver function tests    Anemia, chronic disease    PVC (premature ventricular contraction)    Influenza A    CRISTAL (obstructive sleep apnea)    Acute respiratory failure with hypoxia     Past Medical History:   Diagnosis Date    Cancer     skin cancer    CVD (cerebrovascular disease)     with remote infarcts per CT of head.    H/O Iron deficiency anemia     H/O Traumatic brain injury (CMS/HCC)     H/O: pneumonia 2015    Hyperlipidemia     Hypertension     CRISTAL (obstructive sleep apnea)     Premature atrial contraction     Rheumatoid arthritis     Seizures     Started in 1960s.     Past Surgical History:   Procedure Laterality Date    APPENDECTOMY  1950    CATARACT EXTRACTION      COLONOSCOPY N/A 2017    Procedure: COLONOSCOPY TO CECUM AND TI WITH APC CAUTERY OF RIGHT COLON AVM ;  Surgeon: Lucio Stein MD;  Location: [facility] ENDOSCOPY;  Service:     ENDOSCOPY N/A 2017    Procedure: ESOPHAGOGASTRODUODENOSCOPY with BX ;  Surgeon: Lucio Stein MD;  Location: [facility] ENDOSCOPY;  Service:       General Information       Row Name 24 1031          Physical Therapy Time and Intention    Document Type evaluation  -SM     Mode of Treatment individual therapy;physical  therapy  -       Row Name 02/02/24 1031          General Information    Patient Profile Reviewed yes  -     Prior Level of Function independent:;min assist:  daughter assists as needed  -     Existing Precautions/Restrictions fall  -       Row Name 02/02/24 1031          Living Environment    People in Home child(chuck), adult;spouse  -       Row Name 02/02/24 1031          Home Main Entrance    Number of Stairs, Main Entrance four  -       Row Name 02/02/24 1031          Cognition    Orientation Status (Cognition) oriented x 3  mild confusion noted  -       Row Name 02/02/24 1031          Safety Issues, Functional Mobility    Impairments Affecting Function (Mobility) balance;strength;endurance/activity tolerance  -               User Key  (r) = Recorded By, (t) = Taken By, (c) = Cosigned By      Initials Name Provider Type    Kary Ribeiro PT Physical Therapist                   Mobility       Row Name 02/02/24 1032          Bed Mobility    Comment, (Bed Mobility) Patient sitting UIC upon arrival  -       Row Name 02/02/24 1032          Sit-Stand Transfer    Sit-Stand [place] (Transfers) contact guard;verbal cues  -     Assistive Device (Sit-Stand Transfers) walker, front-wheeled  -       Row Name 02/02/24 1032          Gait/Stairs (Locomotion)    [place] Level (Gait) contact guard;verbal cues  -     Assistive Device (Gait) walker, front-wheeled  -     Distance in Feet (Gait) 100ft  -     Deviations/Abnormal Patterns (Gait) delores decreased;festinating/shuffling;gait speed decreased  -     Bilateral Gait Deviations forward flexed posture  -     Comment, (Gait/Stairs) Gait slow and shuffled with VCs to keep feet inside rwx at times. No overt LOB noted.  -               User Key  (r) = Recorded By, (t) = Taken By, (c) = Cosigned By      Initials Name Provider Type    Kary Ribeiro PT Physical Therapist                   Obj/Interventions       Row Name  02/02/24 1033          Range of Motion Comprehensive    General Range of Motion bilateral lower extremity ROM WFL  -[facility] Name 02/02/24 1033          Strength Comprehensive (MMT)    General Manual Muscle Testing (MMT) Assessment lower extremity strength deficits identified  -     Comment, General Manual Muscle Testing (MMT) Assessment Generalized B LE weakness  -[facility] Name 02/02/24 1033          Balance    Balance Assessment sitting static balance;sitting dynamic balance;sit to stand dynamic balance;standing static balance;standing dynamic balance  -     Static Sitting Balance supervision  -     Dynamic Sitting Balance standby assist  -     Position, Sitting Balance sitting in chair  -     Sit to Stand Dynamic Balance contact guard;verbal cues  -     Static Standing Balance contact guard  -     Dynamic Standing Balance contact guard  -     Position/Device Used, Standing Balance supported;walker, front-wheeled  -     Balance Interventions sitting;standing;sit to stand;static;supported;dynamic  -               User Key  (r) = Recorded By, (t) = Taken By, (c) = Cosigned By      Initials Name Provider Type     Kary Camargo, PT Physical Therapist                   Goals/Plan       [facility] Name 02/02/24 1039          Bed Mobility Goal 1 (PT)    Activity/Assistive Device (Bed Mobility Goal 1, PT) bed mobility activities, all  -     [place] Level/Cues Needed (Bed Mobility Goal 1, PT) modified independence  -     Time Frame (Bed Mobility Goal 1, PT) 1 week  -[facility] Name 02/02/24 1039          Transfer Goal 1 (PT)    Activity/Assistive Device (Transfer Goal 1, PT) sit-to-stand/stand-to-sit;bed-to-chair/chair-to-bed  -     [place] Level/Cues Needed (Transfer Goal 1, PT) supervision required  -     Time Frame (Transfer Goal 1, PT) 1 week  -[facility] Name 02/02/24 1039          Gait Training Goal 1 (PT)    Activity/Assistive Device (Gait Training Goal 1, PT) gait  (walking locomotion);walker, rolling  -SM     [place] Level (Gait Training Goal 1, PT) supervision required  -SM     Distance (Gait Training Goal 1, PT) 150ft  -SM     Time Frame (Gait Training Goal 1, PT) 1 week  -SM               User Key  (r) = Recorded By, (t) = Taken By, (c) = Cosigned By      Initials Name Provider Type    SM Kary Camarog, PT Physical Therapist                   Clinical Impression       Row Name 02/02/24 1035          Pain    Pretreatment Pain Rating 0/10 - no pain  -SM     Posttreatment Pain Rating 0/10 - no pain  -SM       Row Name 02/02/24 1035          Plan of Care Review    Plan of Care Reviewed With patient  -SM     Outcome Evaluation Patient is an 88 y.o male who presented to [facility] with reports of fever and vomiting. Patient AOx3 though moments of confusion noted. Patients daughter at bedside. Patient lives at home with his spouse and daughter with 4 GRADY. Patient is mostly independent at baseline but receives assistance from family as needed. Patient has a cane and rollator and walker at home available for use. Patient performed STS from chair with rwx and CGA. Patient ambulated 100ft with rwx and CGA. Gait slow and shuffled with VCs to keep feet inside rwx at times. No overt LOB noted. Patient returned to sitting in chair at end of session. Encouraged patient to use rwx upon return home. Patient planning to d/c home today with family assist and HHPT follow up. Acute PT will continue to monitor for any mobility needs.  -       Row Name 02/02/24 1035          Therapy Assessment/Plan (PT)    Rehab Potential (PT) good, to achieve stated therapy goals  -     Criteria for Skilled Interventions Met (PT) yes  -SM     Therapy Frequency (PT) 5 times/wk  -       Row Name 02/02/24 1035          Vital Signs    O2 Delivery Pre Treatment room air  -SM     O2 Delivery Intra Treatment room air  -SM     O2 Delivery Post Treatment room air  -SM     Pre Patient Position Sitting  -SM      Intra Patient Position Standing  -SM     Post Patient Position Sitting  -SM       Row Name 02/02/24 1035          Positioning and Restraints    Pre-Treatment Position sitting in chair/recliner  -SM     Post Treatment Position chair  -SM     In Chair sitting;notified nsg;call light within reach;encouraged to call for assist;with family/caregiver  no chair alarm upon arrival. Family member helping patient bathe  -SM               User Key  (r) = Recorded By, (t) = Taken By, (c) = Cosigned By      Initials Name Provider Type    Kary Ribeiro PT Physical Therapist                   Outcome Measures       Row Name 02/02/24 1040          How much help from another person do you currently need...    Turning from your back to your side while in flat bed without using bedrails? 3  -SM     Moving from lying on back to sitting on the side of a flat bed without bedrails? 3  -SM     Moving to and from a bed to a chair (including a wheelchair)? 3  -SM     Standing up from a chair using your arms (e.g., wheelchair, bedside chair)? 3  -SM     Climbing 3-5 steps with a railing? 2  -SM     To walk in hospital room? 3  -SM     AM-PAC 6 Clicks Score (PT) 17  -SM     Highest Level of Mobility Goal 5 --> Static standing  -SM       Row Name 02/02/24 1040          Functional Assessment    Outcome Measure Options AM-PAC 6 Clicks Basic Mobility (PT)  -SM               User Key  (r) = Recorded By, (t) = Taken By, (c) = Cosigned By      Initials Name Provider Type    Kary Ribeiro PT Physical Therapist                                 Physical Therapy Education       Title: PT OT SLP Therapies (Done)       Topic: Physical Therapy (Done)       Point: Mobility training (Done)       Learning Progress Summary             Patient Acceptance, E, VU by  at 2/2/2024 1041                         Point: Home exercise program (Done)       Learning Progress Summary             Patient Acceptance, E, VU by  at 2/2/2024 1041                          Point: Body mechanics (Done)       Learning Progress Summary             Patient Acceptance, E, VU by  at 2/2/2024 1041                         Point: Precautions (Done)       Learning Progress Summary             Patient Acceptance, E, VU by  at 2/2/2024 1041                                         User Key       Initials Effective Dates Name Provider Type Discipline     05/02/22 -  Kary Camargo, PT Physical Therapist PT                  PT Recommendation and Plan     Plan of Care Reviewed With: patient  Outcome Evaluation: Patient is an 88 y.o male who presented to [facility] with reports of fever and vomiting. Patient AOx3 though moments of confusion noted. Patients daughter at bedside. Patient lives at home with his spouse and daughter with 4 GRADY. Patient is mostly independent at baseline but receives assistance from family as needed. Patient has a cane and rollator and walker at home available for use. Patient performed STS from chair with rwx and CGA. Patient ambulated 100ft with rwx and CGA. Gait slow and shuffled with VCs to keep feet inside rwx at times. No overt LOB noted. Patient returned to sitting in chair at end of session. Encouraged patient to use rwx upon return home. Patient planning to d/c home today with family assist and HHPT follow up. Acute PT will continue to monitor for any mobility needs.     Time Calculation:         PT Charges       Row Name 02/02/24 1041             Time Calculation    Start Time 1011  -      Stop Time 1026  -      Time Calculation (min) 15 min  -      PT Received On 02/02/24  -      PT - Next Appointment 02/05/24  -      PT Goal Re-Cert Due Date 02/09/24  -         Time Calculation- PT    Total Timed Code Minutes- PT 8 minute(s)  -SM         Timed Charges    72109 - PT Therapeutic Activity Minutes 8  -SM         Total Minutes    Timed Charges Total Minutes 8  -SM       Total Minutes 8  -SM                User Key  (r) = Recorded By, (t) =  Taken By, (c) = Cosigned By      Initials Name Provider Type     Kary Camargo, PT Physical Therapist                  Therapy Charges for Today       Code Description Service Date Service Provider Modifiers Qty    00546919137 HC PT THERAPEUTIC ACT EA 15 MIN 2/2/2024 Kary Camargo, PT GP 1    17390082176 HC PT EVAL LOW COMPLEXITY 2 2/2/2024 Kary Camargo, PT GP 1            PT G-Codes  Outcome Measure Options: AM-PAC 6 Clicks Basic Mobility (PT)  AM-PAC 6 Clicks Score (PT): 17  PT Discharge Summary  Anticipated Discharge Disposition (PT): home with assist, home with home health    Kary Camargo PT  2/2/2024

## 2024-02-02 NOTE — PROGRESS NOTES
[facility] will follow post hospital as requested. Patient/ daughter agreeable to services. Contact information and PCP confirmed. Best to call daughter, Hannah, at [phone] to schedule home health visits. Verbal order received from Diya with PCP, Dr. Cedillo, for home health care.

## 2024-02-02 NOTE — PROGRESS NOTES
"Enter Query Response Below      Query Response: Acute hypoxic respiratory failure was not supported, hypoxia only. Electronically signed by Saroj STEFANY Beard, SHARLENE, 24, 3:21 PM EST.               If applicable, please update the problem list.   Patient: Juan C Ca        : 1935  Account: [id number]           Admit Date:         How to Respond to this query:       a. Click New Note     b. Answer query within the yellow box.                c. Update the Problem List, if applicable.      If you have any questions about this query contact me at: [email or web address]     Sarojnhan Beard SHARLENE,     The 24 H&P and  preliminary Discharge Summary note \"He was found to have influenza A with subsequent acute respiratory failure with hypoxia.\"  There are no documentation of signs and symptoms of respiratory distress.  SpO2 initially 93% on room air, then ranged from 91-99% on room air up to 3 Liters.  Other related treatment included Tamiflu.    After study, was acute hypoxic respiratory failure clinically supported during this admission?    -Acute hypoxic respiratory failure was supported with additional clinical indicators:____________  -Acute hypoxic respiratory failure was not supported, hypoxia only.   -Other- specify_____________  -Unable to determine    By submitting this query, we are merely seeking further clarification of documentation to accurately reflect all conditions that you are monitoring, evaluating, treating or that extend the hospitalization or utilize additional resources of care. Please utilize your independent clinical judgment when addressing the question(s) above.     This query and your response, once completed, will be entered into the legal medical record.    Sincerely,  Evy GERONIMO, RN, CCDS  Clinical Documentation Improvement Program  [email or web address]  "

## 2024-02-02 NOTE — CASE MANAGEMENT/SOCIAL WORK
Case Management Discharge Note      Final Note: Home with family, [place] . No additional CCP needs.         Selected Continued Care - Admitted Since 1/31/2024       Destination    No services have been selected for the patient.                Durable Medical Equipment    No services have been selected for the patient.                Dialysis/Infusion    No services have been selected for the patient.                Home Medical Care       Service Provider Selected Services Address Phone Fax Patient Preferred    [facility] ,  Home Nursing ,  Home Rehabilitation [address] [address] [phone] --              Therapy    No services have been selected for the patient.                Community Resources    No services have been selected for the patient.                Community & DME    No services have been selected for the patient.                         Final Discharge Disposition Code: 06 - home with home health care

## 2024-02-03 NOTE — OUTREACH NOTE
Prep Survey      Flowsheet Row Responses   [facility] patient discharged from? [facility]   Is LACE score < 7 ? No   Eligibility Readm Mgmt   Discharge diagnosis Influenza A, Acute hypoxic resp failure   Does the patient have one of the following disease processes/diagnoses(primary or secondary)? Other   Does the patient have Home health ordered? Yes   What is the Home health agency?  [facility]   Is there a DME ordered? No   Prep survey completed? Yes            Jolie SANABRIA - Registered Nurse

## 2024-02-04 ENCOUNTER — HOME CARE VISIT (OUTPATIENT)
Dept: HOME HEALTH SERVICES | Facility: HOME HEALTHCARE | Age: 89
End: 2024-02-04
Payer: MEDICARE

## 2024-02-04 PROCEDURE — G0299 HHS/HOSPICE OF RN EA 15 MIN: HCPCS

## 2024-02-04 NOTE — Clinical Note
"SOC Note: Initial visit of patient performed. Pt had wife and daughter present during visit. Pt alert/oriented but mildly forgetful on some details. Pt still getting SOA with ambulation and has frequent wet/productive cough. Pt and family educated on deep breathing exercizes and use of incentive spirometer. Pt and family verbalize understanding and agree to continue with compliance     Home Health ordered for: disciplines SN, PT, and eval for OT     Reason for Hosp/Primary Dx/Co-morbidities: Pt admitted to [facility] from 1/31/2024 - 2/2/2024 for generalized weakness and acute respiratory failure from Influenza A. Pt has PMH of HTN, HLD, CRISTAL, RA on immune modulators, seizure disorder, CVD.     Focus of Care: CP assessments and teaching r/t acute respiratory failure from influenza A     Patient's goal(s): \"To get my strength back and be able to get down the steps.\"    Current Functional status/mobility/DME: Pt currently ambulates throughout the home using cane but family is requesting walker. Pt performs all ADLs with supervision and some assistance x1    HB status/Living Arrangements: Pt currently lives at home with his wife in [facility] home.     Skin Integrity/wound status: no issues    Code Status: Full     Fall Risk/Safety concerns: Pt is high falls risk of 8     Educated on Emergency Plan, steps to take prior to going to the ER and when to Call [facility]    Medication issues/Concerns: none    Additional Problems/Concerns: none    SDOH Barriers (i.e. caregiver concerns, social isolation, transportation, food insecurity, environment, income etc.)/Need for MSW:    Plan for next visit: CP and general assessment, vital signs, goal based teaching per careplan. "

## 2024-02-05 ENCOUNTER — READMISSION MANAGEMENT (OUTPATIENT)
Dept: CALL CENTER | Facility: HOSPITAL | Age: 89
End: 2024-02-05
Payer: MEDICARE

## 2024-02-05 LAB
BACTERIA SPEC AEROBE CULT: NORMAL
BACTERIA SPEC AEROBE CULT: NORMAL

## 2024-02-05 NOTE — HOME HEALTH
"SOC Note: Initial visit of patient performed. Pt had wife and daughter present during visit. Pt alert/oriented but mildly forgetful on some details. Pt still getting SOA with ambulation and has frequent wet/productive cough. Pt and family educated on deep breathing exercizes and use of incentive spirometer. Pt and family verbalize understanding and agree to continue with compliance     Home Health ordered for: disciplines SN, PT, and eval for OT     Reason for Hosp/Primary Dx/Co-morbidities: Pt admitted to [facility] from 1/31/2024 - 2/2/2024 for generalized weakness and acute respiratory failure from Influenza A. Pt has PMH of HTN, HLD, CRISTAL, RA on immune modulators, seizure disorder, CVD.     Focus of Care: CP assessments and teaching r/t acute respiratory failure from influenza A     Patient's goal(s): \"To get my strength back and be able to get down the steps.\"    Current Functional status/mobility/DME: Pt currently ambulates throughout the home using cane but family is requesting walker. Pt performs all ADLs with supervision and some assistance x1    HB status/Living Arrangements: Pt currently lives at home with his wife in [facility] home.     Skin Integrity/wound status: no issues    Code Status: Full     Fall Risk/Safety concerns: Pt is high falls risk of 8     Educated on Emergency Plan, steps to take prior to going to the ER and when to Call [facility]    Medication issues/Concerns: none    Additional Problems/Concerns: none    SDOH Barriers (i.e. caregiver concerns, social isolation, transportation, food insecurity, environment, income etc.)/Need for MSW:    Plan for next visit: CP and general assessment, vital signs, goal based teaching per careplan."

## 2024-02-05 NOTE — OUTREACH NOTE
Medical Week 1 Survey      Flowsheet Row Responses   [facility] patient discharged from? [facility]   Does the patient have one of the following disease processes/diagnoses(primary or secondary)? Other   Week 1 attempt successful? Yes   Call start time 1614   Call end time 1616   Discharge diagnosis Influenza A, Acute hypoxic resp failure   Is patient permission given to speak with other caregiver? Yes   List who call center can speak with Alvina Young   Person spoke with today (if not patient) and relationship Daughter Hannah   Meds reviewed with patient/caregiver? Yes   Is the patient having any side effects they believe may be caused by any medication additions or changes? No   Does the patient have all medications ordered at discharge? Yes   Is the patient taking all medications as directed (includes completed medication regime)? Yes   Does the patient have a primary care provider?  Yes   Comments regarding PCP 2/9/24 PCP   Has the patient kept scheduled appointments due by today? N/A   What is the Home health agency?  [facility]   Has home health visited the patient within 72 hours of discharge? Yes   Home health comments  visited on 2/4/24   Did the patient receive a copy of their discharge instructions? Yes   Nursing interventions Reviewed instructions with patient   What is the patient's perception of their health status since discharge? Improving   Is the patient/caregiver able to teach back signs and symptoms related to disease process for when to call PCP? Yes   Is the patient/caregiver able to teach back signs and symptoms related to disease process for when to call 911? Yes   Is the patient/caregiver able to teach back the hierarchy of who to call/visit for symptoms/problems? PCP, Specialist, Home health nurse, Urgent Care, ED, 911 Yes   If the patient is a current smoker, are they able to teach back resources for cessation? Not a smoker   Week 1 call completed? Yes   Call end time 1616             Susan HEATON - Registered Nurse

## 2024-02-06 ENCOUNTER — HOME CARE VISIT (OUTPATIENT)
Dept: HOME HEALTH SERVICES | Facility: HOME HEALTHCARE | Age: 89
End: 2024-02-06
Payer: MEDICARE

## 2024-02-06 VITALS
WEIGHT: 150 LBS | OXYGEN SATURATION: 97 % | SYSTOLIC BLOOD PRESSURE: 132 MMHG | DIASTOLIC BLOOD PRESSURE: 72 MMHG | BODY MASS INDEX: 23.49 KG/M2 | TEMPERATURE: 97.6 F | HEART RATE: 56 BPM | RESPIRATION RATE: 18 BRPM

## 2024-02-06 VITALS
HEART RATE: 53 BPM | RESPIRATION RATE: 18 BRPM | OXYGEN SATURATION: 97 % | DIASTOLIC BLOOD PRESSURE: 80 MMHG | SYSTOLIC BLOOD PRESSURE: 168 MMHG | TEMPERATURE: 97.1 F

## 2024-02-06 PROCEDURE — G0495 RN CARE TRAIN/EDU IN HH: HCPCS

## 2024-02-06 PROCEDURE — G0151 HHCP-SERV OF PT,EA 15 MIN: HCPCS

## 2024-02-06 NOTE — HOME HEALTH
"Routine Visit Note: SN INSTRUCT TO STAND/AMBULATE EVERY 1-2 HRS FOR STRENGHTENING. PULM HYGEINE, SKIN BREAKDOWN PREVENTION    Skill/education provided: skilled cp assessment/ skin assessment, med review    Patient/caregiver response: pt tolerated w/o c/o discomfort    Plan for next visit: SNV FOR CP ASSESSMENT    Other pertinent info:       Home Health ordered for: disciplines SN, PT, and eval for OT     Reason for Hosp/Primary Dx/Co-morbidities: Pt admitted to [facility] from 1/31/2024 - 2/2/2024 for generalized weakness and acute respiratory failure from Influenza A. Pt has PMH of HTN, HLD, CRISTAL, RA on immune modulators, seizure disorder, CVD.     Focus of Care: CP assessments and teaching r/t acute respiratory failure from influenza A     Patient's goal(s): \"To get my strength back and be able to get down the steps.\""

## 2024-02-06 NOTE — HOME HEALTH
Pt is an 89 yo male recently hospitalized with the flu, N&V.  Prior to the hospitalization his wife reports he had a few falls because he was not compliant with using his cane.  PT reminded pt that he needs to use the cane at all times right now.  PT put in order for request for PCP to order a rwx as they described he did better in the hospital with the rwx.  Pt c/o stiff having a runny nose and cough/congestion.  PT educated on drinking fluids, making sure he is getting up and walking every couple hours.  Pt has a goal of going back to [facility] when he is stronger/feeling better.      Plan for next visit:  check on status of rwx  gait/balance training  HEP

## 2024-02-07 VITALS
OXYGEN SATURATION: 96 % | DIASTOLIC BLOOD PRESSURE: 70 MMHG | RESPIRATION RATE: 16 BRPM | TEMPERATURE: 97.8 F | SYSTOLIC BLOOD PRESSURE: 130 MMHG | HEART RATE: 68 BPM

## 2024-02-08 ENCOUNTER — HOME CARE VISIT (OUTPATIENT)
Dept: HOME HEALTH SERVICES | Facility: HOME HEALTHCARE | Age: 89
End: 2024-02-08
Payer: MEDICARE

## 2024-02-08 VITALS — HEART RATE: 53 BPM | OXYGEN SATURATION: 97 %

## 2024-02-08 VITALS
RESPIRATION RATE: 16 BRPM | DIASTOLIC BLOOD PRESSURE: 80 MMHG | SYSTOLIC BLOOD PRESSURE: 134 MMHG | OXYGEN SATURATION: 96 % | HEART RATE: 56 BPM

## 2024-02-08 PROCEDURE — G0157 HHC PT ASSISTANT EA 15: HCPCS

## 2024-02-08 PROCEDURE — G0299 HHS/HOSPICE OF RN EA 15 MIN: HCPCS

## 2024-02-08 PROCEDURE — G0152 HHCP-SERV OF OT,EA 15 MIN: HCPCS

## 2024-02-08 NOTE — Clinical Note
OT evaluation completed, plan for 2x/week for 2 weeks to address adl training, functional transfers, mobility, safety, energy conservation, UE strength, DME/adpative equipment education

## 2024-02-08 NOTE — HOME HEALTH
Subjective: I am doing ok     2-9-24  Outpatient- [facility] ?  Falls- none  Medication Changes- none    Assessment: Patient seen for having the Flu. I called [facility] and they do not have an order for the RWX. Patient is going to PCP tomorrow and is going to ask for order and take it to [facility] and pick it up if possible. Patient would benefit with more stability of the walker versus cane at this time. He was able to review and progress his HEP in sitting and standing. Patient re-education on medication regimen, hydration with water intake and proper nutrition and used teach back for carryover and accuracy. Patient will benefit with continued PT for progression and reduce risk of decline. Update to PT as needed    Plan for next visit:  Gait training  Review and progress HEP  Balance/transfers/safety

## 2024-02-08 NOTE — HOME HEALTH
Ambulating without assitive device.  Reinforced falls risks and precautions.  VSS.  See lung assess.  SATS WNL on RA.  Has completed tamiflu and mucinex.  Medications reviewed.  Spouse present.  Patient tolerated assessment well.  Patient and spouse verbalized understanding of all T/I.

## 2024-02-09 VITALS — OXYGEN SATURATION: 98 % | HEART RATE: 61 BPM

## 2024-02-09 NOTE — HOME HEALTH
Reason for Referral: Recent hospitalization due to Influenza A, acute respiratory failure, generalized weakness    Medical History: HTN, HLD, CRISTAL, RA, seizure disorder    Subjective: Patient reports still coughing    Home Environment: Patient lives with supportive wife, using cane when reminded, but unsteady    PLOF: Independent, used cane    Medical Necessity: Patient requires skilled occupational therapy for remediation of deficits to improve safety in home and improve self care, functional transfers, mobility, strength, endurance, DME/adaptive equipment, energy conservation     Plan for Next Visit:Safety with adl tasks

## 2024-02-12 ENCOUNTER — HOME CARE VISIT (OUTPATIENT)
Dept: HOME HEALTH SERVICES | Facility: HOME HEALTHCARE | Age: 89
End: 2024-02-12
Payer: MEDICARE

## 2024-02-12 ENCOUNTER — TRANSCRIBE ORDERS (OUTPATIENT)
Dept: ADMINISTRATIVE | Facility: HOSPITAL | Age: 89
End: 2024-02-12
Payer: MEDICARE

## 2024-02-12 DIAGNOSIS — R91.1 LUNG NODULE: Primary | ICD-10-CM

## 2024-02-12 PROCEDURE — G0493 RN CARE EA 15 MIN HH/HOSPICE: HCPCS

## 2024-02-12 PROCEDURE — G0495 RN CARE TRAIN/EDU IN HH: HCPCS

## 2024-02-13 ENCOUNTER — HOME CARE VISIT (OUTPATIENT)
Dept: HOME HEALTH SERVICES | Facility: HOME HEALTHCARE | Age: 89
End: 2024-02-13
Payer: MEDICARE

## 2024-02-13 VITALS — OXYGEN SATURATION: 93 %

## 2024-02-13 PROCEDURE — G0157 HHC PT ASSISTANT EA 15: HCPCS

## 2024-02-13 PROCEDURE — G0152 HHCP-SERV OF OT,EA 15 MIN: HCPCS

## 2024-02-14 ENCOUNTER — READMISSION MANAGEMENT (OUTPATIENT)
Dept: CALL CENTER | Facility: HOSPITAL | Age: 89
End: 2024-02-14
Payer: MEDICARE

## 2024-02-14 VITALS
OXYGEN SATURATION: 95 % | HEART RATE: 51 BPM | RESPIRATION RATE: 16 BRPM | TEMPERATURE: 97.8 F | DIASTOLIC BLOOD PRESSURE: 70 MMHG | SYSTOLIC BLOOD PRESSURE: 130 MMHG

## 2024-02-15 ENCOUNTER — HOME CARE VISIT (OUTPATIENT)
Dept: HOME HEALTH SERVICES | Facility: HOME HEALTHCARE | Age: 89
End: 2024-02-15
Payer: MEDICARE

## 2024-02-15 VITALS
SYSTOLIC BLOOD PRESSURE: 138 MMHG | HEART RATE: 61 BPM | DIASTOLIC BLOOD PRESSURE: 70 MMHG | TEMPERATURE: 97.9 F | OXYGEN SATURATION: 97 %

## 2024-02-15 PROCEDURE — G0493 RN CARE EA 15 MIN HH/HOSPICE: HCPCS

## 2024-02-16 ENCOUNTER — HOME CARE VISIT (OUTPATIENT)
Dept: HOME HEALTH SERVICES | Facility: HOME HEALTHCARE | Age: 89
End: 2024-02-16
Payer: MEDICARE

## 2024-02-16 VITALS
SYSTOLIC BLOOD PRESSURE: 122 MMHG | TEMPERATURE: 97.3 F | DIASTOLIC BLOOD PRESSURE: 68 MMHG | OXYGEN SATURATION: 100 % | HEART RATE: 62 BPM

## 2024-02-16 VITALS
RESPIRATION RATE: 16 BRPM | DIASTOLIC BLOOD PRESSURE: 60 MMHG | OXYGEN SATURATION: 98 % | TEMPERATURE: 98.9 F | SYSTOLIC BLOOD PRESSURE: 120 MMHG | HEART RATE: 65 BPM

## 2024-02-16 PROCEDURE — G0157 HHC PT ASSISTANT EA 15: HCPCS

## 2024-02-16 PROCEDURE — G0152 HHCP-SERV OF OT,EA 15 MIN: HCPCS

## 2024-02-19 ENCOUNTER — HOME CARE VISIT (OUTPATIENT)
Dept: HOME HEALTH SERVICES | Facility: HOME HEALTHCARE | Age: 89
End: 2024-02-19
Payer: MEDICARE

## 2024-02-19 PROCEDURE — G0493 RN CARE EA 15 MIN HH/HOSPICE: HCPCS

## 2024-02-19 NOTE — HOME HEALTH
Routine Visit Note: CLEAR LUNG SOUNDS, DENIES CRISTAL, VS WNL, GOOD APPETITE, NO FALLS,    Skill/education provided: SKILLED PHYSICAL ASSESSMENT    Patient/caregiver response: PT TOLERATED W/O C/O DISCOMFORT    Plan for next visit: LAB DRAW NEXT SNV    Other pertinent info: HX PN

## 2024-02-20 ENCOUNTER — HOME CARE VISIT (OUTPATIENT)
Dept: HOME HEALTH SERVICES | Facility: HOME HEALTHCARE | Age: 89
End: 2024-02-20
Payer: MEDICARE

## 2024-02-20 VITALS
SYSTOLIC BLOOD PRESSURE: 124 MMHG | TEMPERATURE: 97.2 F | DIASTOLIC BLOOD PRESSURE: 68 MMHG | HEART RATE: 53 BPM | OXYGEN SATURATION: 97 %

## 2024-02-20 VITALS
WEIGHT: 150 LBS | HEART RATE: 58 BPM | OXYGEN SATURATION: 95 % | SYSTOLIC BLOOD PRESSURE: 120 MMHG | BODY MASS INDEX: 23.49 KG/M2 | DIASTOLIC BLOOD PRESSURE: 70 MMHG | RESPIRATION RATE: 16 BRPM | TEMPERATURE: 97.8 F

## 2024-02-20 PROCEDURE — G0157 HHC PT ASSISTANT EA 15: HCPCS

## 2024-02-20 NOTE — HOME HEALTH
Subjective: I am doing pretty good    Outpatient- [facility] ? wants to return after discharge- he will discuss with PCP when ready   Falls- none   Medication Changes- none   Cognition- appears to have some mild deficits but safe     Assessment: Patient seen for having the Flu. He is doing better with safety and used cane out of the home today and  longer distances and no AD short distance in the home.He was able to review his HEP in standing at the walker and therapist gave decreaesed cues and assessed with balance in the kitchen with no LOB. Patient re-education on medication regimen, using spirometer, hydration with water intake and proper nutrition and used teach back for carryover and accuracy. Update to PT Debi for discharge next visit    Plan for next visit:   Gait training   Review  HEP   Balance

## 2024-02-21 ENCOUNTER — HOME CARE VISIT (OUTPATIENT)
Dept: HOME HEALTH SERVICES | Facility: HOME HEALTHCARE | Age: 89
End: 2024-02-21
Payer: MEDICARE

## 2024-02-21 PROCEDURE — G0152 HHCP-SERV OF OT,EA 15 MIN: HCPCS

## 2024-02-22 ENCOUNTER — HOME CARE VISIT (OUTPATIENT)
Dept: HOME HEALTH SERVICES | Facility: HOME HEALTHCARE | Age: 89
End: 2024-02-22
Payer: MEDICARE

## 2024-02-22 ENCOUNTER — LAB REQUISITION (OUTPATIENT)
Dept: LAB | Facility: HOSPITAL | Age: 89
End: 2024-02-22
Payer: MEDICARE

## 2024-02-22 VITALS
OXYGEN SATURATION: 95 % | SYSTOLIC BLOOD PRESSURE: 138 MMHG | HEART RATE: 53 BPM | DIASTOLIC BLOOD PRESSURE: 78 MMHG | TEMPERATURE: 97.6 F

## 2024-02-22 DIAGNOSIS — D64.9 ANEMIA, UNSPECIFIED: ICD-10-CM

## 2024-02-22 LAB
ALBUMIN SERPL-MCNC: 3.6 G/DL (ref 3.5–5.2)
ALBUMIN/GLOB SERPL: 1.7 G/DL
ALP SERPL-CCNC: 79 U/L (ref 39–117)
ALT SERPL W P-5'-P-CCNC: 12 U/L (ref 1–41)
ANION GAP SERPL CALCULATED.3IONS-SCNC: 13.1 MMOL/L (ref 5–15)
AST SERPL-CCNC: 24 U/L (ref 1–40)
BASOPHILS # BLD AUTO: 0.04 10*3/MM3 (ref 0–0.2)
BASOPHILS NFR BLD AUTO: 1.1 % (ref 0–1.5)
BILIRUB SERPL-MCNC: 0.3 MG/DL (ref 0–1.2)
BUN SERPL-MCNC: 13 MG/DL (ref 8–23)
BUN/CREAT SERPL: 13.8 (ref 7–25)
CALCIUM SPEC-SCNC: 8.9 MG/DL (ref 8.6–10.5)
CHLORIDE SERPL-SCNC: 107 MMOL/L (ref 98–107)
CO2 SERPL-SCNC: 21.9 MMOL/L (ref 22–29)
CREAT SERPL-MCNC: 0.94 MG/DL (ref 0.76–1.27)
DEPRECATED RDW RBC AUTO: 44.5 FL (ref 37–54)
EGFRCR SERPLBLD CKD-EPI 2021: 78.9 ML/MIN/1.73
EOSINOPHIL # BLD AUTO: 0.12 10*3/MM3 (ref 0–0.4)
EOSINOPHIL NFR BLD AUTO: 3.2 % (ref 0.3–6.2)
ERYTHROCYTE [DISTWIDTH] IN BLOOD BY AUTOMATED COUNT: 13.2 % (ref 12.3–15.4)
GLOBULIN UR ELPH-MCNC: 2.1 GM/DL
GLUCOSE SERPL-MCNC: 125 MG/DL (ref 65–99)
HCT VFR BLD AUTO: 32.8 % (ref 37.5–51)
HGB BLD-MCNC: 10.6 G/DL (ref 13–17.7)
IMM GRANULOCYTES # BLD AUTO: 0.02 10*3/MM3 (ref 0–0.05)
IMM GRANULOCYTES NFR BLD AUTO: 0.5 % (ref 0–0.5)
LYMPHOCYTES # BLD AUTO: 0.9 10*3/MM3 (ref 0.7–3.1)
LYMPHOCYTES NFR BLD AUTO: 24.3 % (ref 19.6–45.3)
MCH RBC QN AUTO: 30.4 PG (ref 26.6–33)
MCHC RBC AUTO-ENTMCNC: 32.3 G/DL (ref 31.5–35.7)
MCV RBC AUTO: 94 FL (ref 79–97)
MONOCYTES # BLD AUTO: 0.37 10*3/MM3 (ref 0.1–0.9)
MONOCYTES NFR BLD AUTO: 10 % (ref 5–12)
NEUTROPHILS NFR BLD AUTO: 2.25 10*3/MM3 (ref 1.7–7)
NEUTROPHILS NFR BLD AUTO: 60.9 % (ref 42.7–76)
NRBC BLD AUTO-RTO: 0 /100 WBC (ref 0–0.2)
PLATELET # BLD AUTO: 152 10*3/MM3 (ref 140–450)
PMV BLD AUTO: 11.5 FL (ref 6–12)
POTASSIUM SERPL-SCNC: 3.9 MMOL/L (ref 3.5–5.2)
PROT SERPL-MCNC: 5.7 G/DL (ref 6–8.5)
RBC # BLD AUTO: 3.49 10*6/MM3 (ref 4.14–5.8)
SODIUM SERPL-SCNC: 142 MMOL/L (ref 136–145)
WBC NRBC COR # BLD AUTO: 3.7 10*3/MM3 (ref 3.4–10.8)

## 2024-02-22 PROCEDURE — 80053 COMPREHEN METABOLIC PANEL: CPT | Performed by: INTERNAL MEDICINE

## 2024-02-22 PROCEDURE — 85025 COMPLETE CBC W/AUTO DIFF WBC: CPT | Performed by: INTERNAL MEDICINE

## 2024-02-22 PROCEDURE — G0493 RN CARE EA 15 MIN HH/HOSPICE: HCPCS

## 2024-02-22 NOTE — HOME HEALTH
Routine Visit Note: SN OBTAINED LAB VIA VENIPUNTURE X1 FROM LT ANTECUBE, PT TOLERATED W/O C/O ADVERSE EFFECT. SPECIMEN TAKEN TO [facility]    Skill/education provided: SKILLED PULM/CV ASSESSMENT,     Patient/caregiver response: PT TOLERATED W/O C/O DISCOMFORT    Plan for next visit: SNV FOR PUL/ CV ASSESS/INSTRUCT R/T RECENT PNEUMONIA    Other pertinent info: HX DEMENTIA

## 2024-02-23 ENCOUNTER — HOME CARE VISIT (OUTPATIENT)
Dept: HOME HEALTH SERVICES | Facility: HOME HEALTHCARE | Age: 89
End: 2024-02-23
Payer: MEDICARE

## 2024-02-23 VITALS
SYSTOLIC BLOOD PRESSURE: 180 MMHG | HEART RATE: 62 BPM | RESPIRATION RATE: 18 BRPM | TEMPERATURE: 97.7 F | OXYGEN SATURATION: 99 % | DIASTOLIC BLOOD PRESSURE: 72 MMHG

## 2024-02-23 VITALS
RESPIRATION RATE: 16 BRPM | TEMPERATURE: 98.2 F | HEART RATE: 75 BPM | DIASTOLIC BLOOD PRESSURE: 80 MMHG | SYSTOLIC BLOOD PRESSURE: 130 MMHG | OXYGEN SATURATION: 98 %

## 2024-02-23 PROCEDURE — G0151 HHCP-SERV OF PT,EA 15 MIN: HCPCS

## 2024-02-23 NOTE — HOME HEALTH
Pt reports he is feeling better and plans to go back to [facility].  OT dc'd this week.  SN still in with patient.

## 2024-02-26 ENCOUNTER — HOME CARE VISIT (OUTPATIENT)
Dept: HOME HEALTH SERVICES | Facility: HOME HEALTHCARE | Age: 89
End: 2024-02-26
Payer: MEDICARE

## 2024-02-26 PROCEDURE — G0493 RN CARE EA 15 MIN HH/HOSPICE: HCPCS

## 2024-02-26 NOTE — HOME HEALTH
Routine Visit Note: pt manages own meds,    Skill/education provided: skilled cp assessmsnet, meds review    Patient/caregiver response: pt tolerated w/o c/o discomfort    Plan for next visit: snv for resp/cv assess/instruct    Other pertinent info: hx dementia

## 2024-02-27 VITALS
DIASTOLIC BLOOD PRESSURE: 60 MMHG | OXYGEN SATURATION: 98 % | TEMPERATURE: 98.3 F | RESPIRATION RATE: 16 BRPM | SYSTOLIC BLOOD PRESSURE: 110 MMHG | HEART RATE: 83 BPM

## 2024-02-29 ENCOUNTER — HOME CARE VISIT (OUTPATIENT)
Dept: HOME HEALTH SERVICES | Facility: HOME HEALTHCARE | Age: 89
End: 2024-02-29
Payer: MEDICARE

## 2024-02-29 PROCEDURE — G0493 RN CARE EA 15 MIN HH/HOSPICE: HCPCS

## 2024-02-29 NOTE — HOME HEALTH
Skill/education provided: skilled cp assesment, med review    Patient/caregiver response: pt tolerated w/o c/o discomfort    Plan for next visit: pre-d/c SNV    Other pertinent info: RECENT PN

## 2024-03-01 VITALS
TEMPERATURE: 98.7 F | SYSTOLIC BLOOD PRESSURE: 140 MMHG | RESPIRATION RATE: 16 BRPM | DIASTOLIC BLOOD PRESSURE: 70 MMHG | OXYGEN SATURATION: 98 % | HEART RATE: 58 BPM

## 2024-03-05 ENCOUNTER — HOME CARE VISIT (OUTPATIENT)
Dept: HOME HEALTH SERVICES | Facility: HOME HEALTHCARE | Age: 89
End: 2024-03-05
Payer: MEDICARE

## 2024-03-05 PROCEDURE — G0493 RN CARE EA 15 MIN HH/HOSPICE: HCPCS

## 2024-03-05 NOTE — HOME HEALTH
Routine Visit Note:    Skill/education provided: Skilled CP assessment, med review, d/c planning    Patient/caregiver response: pt tolerated w/o c/o discomfort    Plan for next visit: d/c next snv    Other pertinent info:

## 2024-03-08 VITALS
HEART RATE: 68 BPM | TEMPERATURE: 98.6 F | DIASTOLIC BLOOD PRESSURE: 70 MMHG | RESPIRATION RATE: 16 BRPM | OXYGEN SATURATION: 97 % | SYSTOLIC BLOOD PRESSURE: 130 MMHG

## 2024-03-11 ENCOUNTER — HOSPITAL ENCOUNTER (OUTPATIENT)
Facility: HOSPITAL | Age: 89
Discharge: HOME | End: 2024-03-11
Admitting: INTERNAL MEDICINE
Payer: MEDICARE

## 2024-03-11 DIAGNOSIS — R91.1 LUNG NODULE: ICD-10-CM

## 2024-03-11 PROCEDURE — 71250 CT THORAX DX C-: CPT

## 2024-03-12 ENCOUNTER — HOME CARE VISIT (OUTPATIENT)
Dept: HOME HEALTH SERVICES | Facility: HOME HEALTHCARE | Age: 89
End: 2024-03-12
Payer: MEDICARE

## 2024-03-12 PROCEDURE — G0493 RN CARE EA 15 MIN HH/HOSPICE: HCPCS

## 2024-03-12 PROCEDURE — G0495 RN CARE TRAIN/EDU IN HH: HCPCS

## 2024-05-13 ENCOUNTER — OFFICE VISIT (OUTPATIENT)
Dept: CARDIOLOGY | Facility: CLINIC | Age: 89
End: 2024-05-13
Payer: MEDICARE

## 2024-05-13 VITALS
HEIGHT: 67 IN | OXYGEN SATURATION: 97 % | HEART RATE: 68 BPM | BODY MASS INDEX: 23.7 KG/M2 | SYSTOLIC BLOOD PRESSURE: 136 MMHG | WEIGHT: 151 LBS | DIASTOLIC BLOOD PRESSURE: 72 MMHG

## 2024-05-13 DIAGNOSIS — E78.2 MIXED HYPERLIPIDEMIA: ICD-10-CM

## 2024-05-13 DIAGNOSIS — G47.33 OSA (OBSTRUCTIVE SLEEP APNEA): ICD-10-CM

## 2024-05-13 DIAGNOSIS — I10 PRIMARY HYPERTENSION: Primary | ICD-10-CM

## 2024-05-13 DIAGNOSIS — I49.3 PVC (PREMATURE VENTRICULAR CONTRACTION): ICD-10-CM

## 2024-05-13 PROCEDURE — 99214 OFFICE O/P EST MOD 30 MIN: CPT | Performed by: NURSE PRACTITIONER

## 2024-05-13 PROCEDURE — 1159F MED LIST DOCD IN RCRD: CPT | Performed by: NURSE PRACTITIONER

## 2024-05-13 PROCEDURE — 1160F RVW MEDS BY RX/DR IN RCRD: CPT | Performed by: NURSE PRACTITIONER

## 2024-05-13 PROCEDURE — 93000 ELECTROCARDIOGRAM COMPLETE: CPT | Performed by: NURSE PRACTITIONER

## 2024-05-13 NOTE — PROGRESS NOTES
Date of Office Visit: 2024  Encounter Provider: SHARLENE Mcmillan  Place of Service: [facility] CARDIOLOGY  Patient Name: Juan C Ca  :1935    Chief Complaint   Patient presents with    Follow-up    pvc    Dizziness   :     HPI: Juan C Ca is a 88 y.o. male who is a patient of  Dr. David and is new to me today.  He has a history of rheumatoid arthritis mild cognitive impairment from recent traumatic brain injury.  He has a history of a large PVC burden on Holter and dizziness.  He was last in the office about a year ago.  He had been worked up by neurology for dizziness and lightheadedness symptoms occurred with exertion or changing positions.  He had had an extensive neurology workup.  He went to see Dr. Garcia due to PVCs he felt like at the time they were unrelated to his symptoms.  We did a stress test and echocardiogram both of which were unremarkable.  He has been maintained on metoprolol and his symptoms were better controlled.  He is here for 1 year follow-up.    He comes in today for follow-up.  For the most part he is asymptomatic he still gets some dizziness from time to time he is also.  A little bit of short of breath but he had the flu a couple months ago had some long-lasting effects with inflammation in his veins.  CT of the chest in February also showed some coronary calcium which is not unusual for someone of his age.  He has 1 single PVC on his EKG today.  Previous testing and notes have been reviewed by me.   Past Medical History:   Diagnosis Date    Cancer     skin cancer    CVD (cerebrovascular disease)     with remote infarcts per CT of head.    H/O Iron deficiency anemia     H/O Traumatic brain injury (CMS/HCC)     H/O: pneumonia 2015    Hyperlipidemia     Hypertension     CRISTAL (obstructive sleep apnea)     Premature atrial contraction     Rheumatoid arthritis     Seizures     Started in 1960s.       Past Surgical History:    Procedure Laterality Date    APPENDECTOMY  1950    CATARACT EXTRACTION      COLONOSCOPY N/A 2017    Procedure: COLONOSCOPY TO CECUM AND TI WITH APC CAUTERY OF RIGHT COLON AVM ;  Surgeon: Lucio Stein MD;  Location: [facility] ENDOSCOPY;  Service:     ENDOSCOPY N/A 2017    Procedure: ESOPHAGOGASTRODUODENOSCOPY with BX ;  Surgeon: Lucio Stein MD;  Location: [facility] ENDOSCOPY;  Service:        Social History     Socioeconomic History    Marital status:      Spouse name: Abbie    Years of education: College   Tobacco Use    Smoking status: Former     Current packs/day: 0.00     Types: Cigarettes     Start date: 1972     Quit date: 1987     Years since quittin.4    Smokeless tobacco: Never    Tobacco comments:     Caffeine - coffee and tea    Vaping Use    Vaping status: Never Used   Substance and Sexual Activity    Alcohol use: No    Drug use: No    Sexual activity: Yes     Partners: Female     Birth control/protection: None       Family History   Problem Relation Age of Onset    Colon cancer Other     Hypertension Mother     Mental illness Mother     Hypertension Father     Heart disease Sister     Hypertension Sister     Colon cancer Sister     Skin cancer Sister     Heart disease Brother     Hypertension Brother     Lung cancer Brother     Diabetes Daughter     Throat cancer Brother     Cancer Brother         Bladder    Prostate cancer Brother        Review of Systems   Constitutional: Negative for diaphoresis and malaise/fatigue.   Cardiovascular:  Negative for chest pain, claudication, dyspnea on exertion, irregular heartbeat, leg swelling, near-syncope, orthopnea, palpitations, paroxysmal nocturnal dyspnea and syncope.   Respiratory:  Negative for cough, shortness of breath and sleep disturbances due to breathing.    Musculoskeletal:  Negative for falls.   Neurological:  Negative for dizziness and weakness.   Psychiatric/Behavioral:  Negative for altered mental status and  substance abuse.        Allergies   Allergen Reactions    Sulfadiazine Unknown - High Severity     fever  Other reaction(s): Fever         Current Outpatient Medications:     acetaminophen (TYLENOL) 500 MG tablet, Take 2 tablets by mouth Every 6 (Six) Hours As Needed for Mild Pain  or Fever., Disp:  , Rfl:     aspirin 81 MG EC tablet, Take 1 tablet by mouth Daily. Indications: Buildup of Plaques in Large Arteries Leading to the Brain, Disp: , Rfl:     cetirizine (zyrTEC) 10 MG tablet, Take 0.5 tablets by mouth Daily., Disp: , Rfl:     docusate sodium (COLACE) 100 MG capsule, Take 1 capsule by mouth 2 (Two) Times a Day. PRN  Indications: Constipation, Disp: , Rfl:     famotidine (PEPCID) 20 MG tablet, Take 1 tablet by mouth Daily., Disp: 30 tablet, Rfl: 3    hydroxychloroquine (PLAQUENIL) 200 MG tablet, Take 1 tablet by mouth Daily. Indications: Rheumatoid Arthritis, Disp: , Rfl:     leflunomide (ARAVA) 20 MG tablet, Take 1 tablet by mouth Daily. Indications: Rheumatoid Arthritis, Disp: , Rfl:     levETIRAcetam (KEPPRA) 500 MG tablet, Take 1 tablet by mouth 2 (Two) Times a Day. Indications: Seizure, Disp: , Rfl:     levothyroxine (SYNTHROID, LEVOTHROID) 25 MCG tablet, Take 1 tablet by mouth Daily. Indications: Underactive Thyroid, Disp: , Rfl:     memantine (NAMENDA) 10 MG tablet, Take 1 tablet by mouth Daily. 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 Indications: Urinary Incontinence, Disp: , Rfl:     Multiple Vitamin (MULTI VITAMIN MENS PO), Take 1 tablet by mouth Daily. Indications: Nutritional Support, Disp: , Rfl:     multivitamins-minerals (PRESERVISION AREDS 2) capsule capsule, , Disp: , Rfl:     tamsulosin (FLOMAX) 0.4 MG capsule 24 hr capsule, Take 1 capsule  "by mouth every night., Disp: 30 capsule, Rfl: 0    vitamin D3 125 MCG (5000 UT) capsule capsule, Indications: Vitamin D Deficiency, Disp: , Rfl:       Objective:     Vitals:    05/13/24 1201   BP: 136/72   Pulse: 68   SpO2: 97%   Weight: 68.5 kg (151 lb)   Height: 170.2 cm (67\")     Body mass index is 23.65 kg/m².    PHYSICAL EXAM:    Constitutional:       General: Not in acute distress.     Appearance: Normal appearance. Well-developed.   Eyes:      Pupils: Pupils are equal, round, and reactive to light.   HENT:      Head: Normocephalic.   Neck:      Vascular: No carotid bruit or JVD.   Pulmonary:      Effort: Pulmonary effort is normal. No tachypnea.      Breath sounds: Normal breath sounds. No wheezing. No rales.   Cardiovascular:      Normal rate. Regular rhythm.      No gallop.    Pulses:     Intact distal pulses.   Edema:     Peripheral edema absent.   Abdominal:      General: Bowel sounds are normal.      Palpations: Abdomen is soft.      Tenderness: There is no abdominal tenderness.   Musculoskeletal: Normal range of motion.      Cervical back: Normal range of motion and neck supple. No edema. Skin:     General: Skin is warm and dry.   Neurological:      Mental Status: Alert and oriented to person, place, and time.           ECG 12 Lead    Date/Time: 5/13/2024 12:45 PM  Performed by: Vibha Mendiola APRN    Authorized by: Vibha Mendiola APRN  Comparison: compared with previous ECG from 1/31/2024  Similar to previous ECG  Rhythm: sinus rhythm  Ectopy: infrequent PVCs  Rate: normal  Q waves: III and aVF    QRS axis: normal    Clinical impression: non-specific ECG            Assessment:       Diagnosis Plan   1. Primary hypertension     Blood pressure controlled on current regimen.      3. PVC (premature ventricular contraction)     Stable on current regimen.  Continue with beta-blocker asymptomatic   4. CRISTAL (obstructive sleep apnea)     Compliant every night with CPAP    5.  Dizziness.  Remains dizzy " but I think this could be some positional vertigo has appointment with neurology in a few days.          No orders of the defined types were placed in this encounter.         Plan:       Follow-up in 1 year         Your medication list            Accurate as of May 13, 2024 12:45 PM. If you have any questions, ask your nurse or doctor.                CONTINUE taking these medications        Instructions Last Dose Given Next Dose Due   acetaminophen 500 MG tablet  Commonly known as: TYLENOL      Take 2 tablets by mouth Every 6 (Six) Hours As Needed for Mild Pain  or Fever.       aspirin 81 MG EC tablet      Take 1 tablet by mouth Daily. Indications: Buildup of Plaques in Large Arteries Leading to the Brain       cetirizine 10 MG tablet  Commonly known as: zyrTEC      Take 0.5 tablets by mouth Daily.       docusate sodium 100 MG capsule  Commonly known as: COLACE      Take 1 capsule by mouth 2 (Two) Times a Day. PRN  Indications: Constipation       Famotidine Maximum Strength 20 MG tablet  Generic drug: famotidine      Take 1 tablet by mouth Daily.       hydroxychloroquine 200 MG tablet  Commonly known as: PLAQUENIL      Take 1 tablet by mouth Daily. Indications: Rheumatoid Arthritis       leflunomide 20 MG tablet  Commonly known as: ARAVA      Take 1 tablet by mouth Daily. Indications: Rheumatoid Arthritis       levETIRAcetam 500 MG tablet  Commonly known as: KEPPRA      Take 1 tablet by mouth 2 (Two) Times a Day. Indications: Seizure       levothyroxine 25 MCG tablet  Commonly known as: SYNTHROID, LEVOTHROID      Take 1 tablet by mouth Daily. Indications: Underactive Thyroid       memantine 10 MG tablet  Commonly known as: NAMENDA      Take 1 tablet by mouth Daily. 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       Mirabegron ER 50 MG tablet sustained-release 24 hour 24 hr  tablet  Commonly known as: MYRBETRIQ      Take 50 mg by mouth. Indications: Urinary Incontinence       multivitamin tablet tablet  Commonly known as: THERAGRAN      Take 1 tablet by mouth Daily. Indications: Nutritional Support       multivitamins-minerals capsule capsule           tamsulosin 0.4 MG capsule 24 hr capsule  Commonly known as: FLOMAX      Take 1 capsule by mouth every night.       vitamin D3 125 MCG (5000 UT) capsule capsule      Indications: Vitamin D Deficiency                  As always, it has been a pleasure to participate in your patient's care.      Sincerely,     Vibha SU

## 2024-05-20 ENCOUNTER — TRANSCRIBE ORDERS (OUTPATIENT)
Dept: ADMINISTRATIVE | Facility: HOSPITAL | Age: 89
End: 2024-05-20
Payer: MEDICARE

## 2024-05-20 DIAGNOSIS — R93.89 ABNORMAL CHEST CT: Primary | ICD-10-CM

## 2024-06-22 ENCOUNTER — HOSPITAL ENCOUNTER (OUTPATIENT)
Facility: HOSPITAL | Age: 89
Discharge: HOME | End: 2024-06-22
Payer: MEDICARE

## 2024-06-22 DIAGNOSIS — R93.89 ABNORMAL CHEST CT: ICD-10-CM

## 2024-06-22 PROCEDURE — 71250 CT THORAX DX C-: CPT

## 2024-06-27 ENCOUNTER — LAB (OUTPATIENT)
Dept: LAB | Facility: HOSPITAL | Age: 89
End: 2024-06-27
Payer: MEDICARE

## 2024-06-27 ENCOUNTER — TRANSCRIBE ORDERS (OUTPATIENT)
Dept: ADMINISTRATIVE | Facility: HOSPITAL | Age: 89
End: 2024-06-27
Payer: MEDICARE

## 2024-06-27 DIAGNOSIS — R60.0 LOCALIZED EDEMA: Primary | ICD-10-CM

## 2024-06-27 DIAGNOSIS — I50.1: ICD-10-CM

## 2024-06-27 DIAGNOSIS — R60.0 PEDAL EDEMA: Primary | ICD-10-CM

## 2024-06-27 DIAGNOSIS — R60.0 PEDAL EDEMA: ICD-10-CM

## 2024-06-27 LAB
ANION GAP SERPL CALCULATED.3IONS-SCNC: 6 MMOL/L (ref 5–15)
BUN SERPL-MCNC: 14 MG/DL (ref 8–23)
BUN/CREAT SERPL: 13 (ref 7–25)
CALCIUM SPEC-SCNC: 9 MG/DL (ref 8.6–10.5)
CHLORIDE SERPL-SCNC: 109 MMOL/L (ref 98–107)
CO2 SERPL-SCNC: 25 MMOL/L (ref 22–29)
CREAT SERPL-MCNC: 1.08 MG/DL (ref 0.76–1.27)
EGFRCR SERPLBLD CKD-EPI 2021: 66.8 ML/MIN/1.73
GLUCOSE SERPL-MCNC: 82 MG/DL (ref 65–99)
NT-PROBNP SERPL-MCNC: 1404 PG/ML (ref 0–1800)
POTASSIUM SERPL-SCNC: 4.7 MMOL/L (ref 3.5–5.2)
SODIUM SERPL-SCNC: 140 MMOL/L (ref 136–145)
T4 FREE SERPL-MCNC: 1.23 NG/DL (ref 0.92–1.68)
TSH SERPL DL<=0.05 MIU/L-ACNC: 4.02 UIU/ML (ref 0.27–4.2)

## 2024-06-27 PROCEDURE — 83880 ASSAY OF NATRIURETIC PEPTIDE: CPT

## 2024-06-27 PROCEDURE — 84439 ASSAY OF FREE THYROXINE: CPT

## 2024-06-27 PROCEDURE — 36415 COLL VENOUS BLD VENIPUNCTURE: CPT

## 2024-06-27 PROCEDURE — 80048 BASIC METABOLIC PNL TOTAL CA: CPT

## 2024-06-27 PROCEDURE — 84443 ASSAY THYROID STIM HORMONE: CPT

## 2024-07-05 ENCOUNTER — HOSPITAL ENCOUNTER (OUTPATIENT)
Dept: CARDIOLOGY | Facility: HOSPITAL | Age: 89
Discharge: HOME | End: 2024-07-05
Payer: MEDICARE

## 2024-07-05 VITALS
HEIGHT: 67 IN | WEIGHT: 151 LBS | SYSTOLIC BLOOD PRESSURE: 138 MMHG | HEART RATE: 89 BPM | BODY MASS INDEX: 23.7 KG/M2 | DIASTOLIC BLOOD PRESSURE: 80 MMHG

## 2024-07-05 DIAGNOSIS — R60.0 LOCALIZED EDEMA: ICD-10-CM

## 2024-07-05 LAB
AORTIC ARCH: 3.1 CM
AORTIC DIMENSIONLESS INDEX: 0.3 (DI)
ASCENDING AORTA: 3.1 CM
BH CV ECHO MEAS - ACS: 1.72 CM
BH CV ECHO MEAS - AO MAX PG: 12.9 MMHG
BH CV ECHO MEAS - AO MEAN PG: 7.1 MMHG
BH CV ECHO MEAS - AO ROOT DIAM: 3.1 CM
BH CV ECHO MEAS - AO V2 MAX: 179.6 CM/SEC
BH CV ECHO MEAS - AO V2 VTI: 45.1 CM
BH CV ECHO MEAS - AVA(I,D): 1.2 CM2
BH CV ECHO MEAS - EDV(CUBED): 119.5 ML
BH CV ECHO MEAS - EDV(MOD-SP2): 104 ML
BH CV ECHO MEAS - EDV(MOD-SP4): 107 ML
BH CV ECHO MEAS - EF(MOD-BP): 61.8 %
BH CV ECHO MEAS - EF(MOD-SP2): 63.5 %
BH CV ECHO MEAS - EF(MOD-SP4): 60.7 %
BH CV ECHO MEAS - ESV(CUBED): 37.1 ML
BH CV ECHO MEAS - ESV(MOD-SP2): 38 ML
BH CV ECHO MEAS - ESV(MOD-SP4): 42 ML
BH CV ECHO MEAS - FS: 32.3 %
BH CV ECHO MEAS - IVS/LVPW: 1.03 CM
BH CV ECHO MEAS - IVSD: 1.08 CM
BH CV ECHO MEAS - LAT PEAK E' VEL: 7.1 CM/SEC
BH CV ECHO MEAS - LV MASS(C)D: 193.3 GRAMS
BH CV ECHO MEAS - LV MAX PG: 2.32 MMHG
BH CV ECHO MEAS - LV MEAN PG: 1.08 MMHG
BH CV ECHO MEAS - LV V1 MAX: 76.2 CM/SEC
BH CV ECHO MEAS - LV V1 VTI: 17.8 CM
BH CV ECHO MEAS - LVIDD: 4.9 CM
BH CV ECHO MEAS - LVIDS: 3.3 CM
BH CV ECHO MEAS - LVOT AREA: 3 CM2
BH CV ECHO MEAS - LVOT DIAM: 1.97 CM
BH CV ECHO MEAS - LVPWD: 1.05 CM
BH CV ECHO MEAS - MED PEAK E' VEL: 4.6 CM/SEC
BH CV ECHO MEAS - MR MAX PG: 47.7 MMHG
BH CV ECHO MEAS - MR MAX VEL: 345.3 CM/SEC
BH CV ECHO MEAS - MV A DUR: 0.11 SEC
BH CV ECHO MEAS - MV A MAX VEL: 67.7 CM/SEC
BH CV ECHO MEAS - MV DEC SLOPE: 407 CM/SEC2
BH CV ECHO MEAS - MV DEC TIME: 0.16 SEC
BH CV ECHO MEAS - MV E MAX VEL: 69.7 CM/SEC
BH CV ECHO MEAS - MV E/A: 1.03
BH CV ECHO MEAS - MV MAX PG: 2.11 MMHG
BH CV ECHO MEAS - MV MEAN PG: 1.2 MMHG
BH CV ECHO MEAS - MV P1/2T: 54.2 MSEC
BH CV ECHO MEAS - MV V2 VTI: 30.1 CM
BH CV ECHO MEAS - MVA(P1/2T): 4.1 CM2
BH CV ECHO MEAS - MVA(VTI): 1.8 CM2
BH CV ECHO MEAS - PA ACC TIME: 0.12 SEC
BH CV ECHO MEAS - PA V2 MAX: 108.6 CM/SEC
BH CV ECHO MEAS - PI END-D VEL: 67.3 CM/SEC
BH CV ECHO MEAS - PULM A REVS DUR: 0.15 SEC
BH CV ECHO MEAS - PULM A REVS VEL: 18.9 CM/SEC
BH CV ECHO MEAS - PULM DIAS VEL: 53.6 CM/SEC
BH CV ECHO MEAS - PULM S/D: 0.89
BH CV ECHO MEAS - PULM SYS VEL: 47.6 CM/SEC
BH CV ECHO MEAS - QP/QS: 0.87
BH CV ECHO MEAS - RAP SYSTOLE: 3 MMHG
BH CV ECHO MEAS - RV MAX PG: 1.6 MMHG
BH CV ECHO MEAS - RV V1 MAX: 63.2 CM/SEC
BH CV ECHO MEAS - RV V1 VTI: 15.6 CM
BH CV ECHO MEAS - RVOT DIAM: 1.96 CM
BH CV ECHO MEAS - RVSP: 25 MMHG
BH CV ECHO MEAS - SV(LVOT): 54.3 ML
BH CV ECHO MEAS - SV(MOD-SP2): 66 ML
BH CV ECHO MEAS - SV(MOD-SP4): 65 ML
BH CV ECHO MEAS - SV(RVOT): 47 ML
BH CV ECHO MEAS - TAPSE (>1.6): 2.12 CM
BH CV ECHO MEAS - TR MAX PG: 21.6 MMHG
BH CV ECHO MEAS - TR MAX VEL: 232.4 CM/SEC
BH CV ECHO MEASUREMENTS AVERAGE E/E' RATIO: 11.91
BH CV XLRA - RV BASE: 3.2 CM
BH CV XLRA - RV LENGTH: 7.3 CM
BH CV XLRA - RV MID: 2.6 CM
BH CV XLRA - TDI S': 8.7 CM/SEC
LEFT ATRIUM VOLUME INDEX: 36 ML/M2
SINUS: 3 CM
STJ: 2.8 CM

## 2024-07-05 PROCEDURE — 93306 TTE W/DOPPLER COMPLETE: CPT

## 2024-07-05 PROCEDURE — 25510000001 PERFLUTREN (DEFINITY) 8.476 MG IN SODIUM CHLORIDE (PF) 0.9 % 10 ML INJECTION: Performed by: INTERNAL MEDICINE

## 2024-07-05 RX ADMIN — SODIUM CHLORIDE 3 ML: 9 INJECTION INTRAMUSCULAR; INTRAVENOUS; SUBCUTANEOUS at 16:01

## 2024-07-15 ENCOUNTER — LAB (OUTPATIENT)
Dept: LAB | Facility: HOSPITAL | Age: 89
End: 2024-07-15
Payer: MEDICARE

## 2024-07-15 ENCOUNTER — TRANSCRIBE ORDERS (OUTPATIENT)
Dept: ADMINISTRATIVE | Facility: HOSPITAL | Age: 89
End: 2024-07-15
Payer: MEDICARE

## 2024-07-15 DIAGNOSIS — R60.9 EDEMA, UNSPECIFIED TYPE: Primary | ICD-10-CM

## 2024-07-15 DIAGNOSIS — R60.9 EDEMA, UNSPECIFIED TYPE: ICD-10-CM

## 2024-07-15 LAB
ALBUMIN SERPL-MCNC: 4.4 G/DL (ref 3.5–5.2)
ALBUMIN/GLOB SERPL: 1.9 G/DL
ALP SERPL-CCNC: 77 U/L (ref 39–117)
ALT SERPL W P-5'-P-CCNC: 16 U/L (ref 1–41)
ANION GAP SERPL CALCULATED.3IONS-SCNC: 11 MMOL/L (ref 5–15)
AST SERPL-CCNC: 21 U/L (ref 1–40)
BILIRUB SERPL-MCNC: 0.3 MG/DL (ref 0–1.2)
BUN SERPL-MCNC: 24 MG/DL (ref 8–23)
BUN/CREAT SERPL: 20.3 (ref 7–25)
CALCIUM SPEC-SCNC: 9.6 MG/DL (ref 8.6–10.5)
CHLORIDE SERPL-SCNC: 103 MMOL/L (ref 98–107)
CO2 SERPL-SCNC: 25 MMOL/L (ref 22–29)
CREAT SERPL-MCNC: 1.18 MG/DL (ref 0.76–1.27)
EGFRCR SERPLBLD CKD-EPI 2021: 60.1 ML/MIN/1.73
GLOBULIN UR ELPH-MCNC: 2.3 GM/DL
GLUCOSE SERPL-MCNC: 85 MG/DL (ref 65–99)
POTASSIUM SERPL-SCNC: 4.6 MMOL/L (ref 3.5–5.2)
PROT SERPL-MCNC: 6.7 G/DL (ref 6–8.5)
SODIUM SERPL-SCNC: 139 MMOL/L (ref 136–145)

## 2024-07-15 PROCEDURE — 80053 COMPREHEN METABOLIC PANEL: CPT

## 2024-07-15 PROCEDURE — 36415 COLL VENOUS BLD VENIPUNCTURE: CPT

## 2024-08-21 ENCOUNTER — HOSPITAL ENCOUNTER (OUTPATIENT)
Dept: GENERAL RADIOLOGY | Facility: HOSPITAL | Age: 89
Discharge: HOME | End: 2024-08-21
Admitting: INTERNAL MEDICINE
Payer: MEDICARE

## 2024-08-21 DIAGNOSIS — R60.0 PEDAL EDEMA: ICD-10-CM

## 2024-08-21 PROCEDURE — 71046 X-RAY EXAM CHEST 2 VIEWS: CPT

## 2024-10-07 ENCOUNTER — LAB (OUTPATIENT)
Dept: LAB | Facility: HOSPITAL | Age: 89
End: 2024-10-07
Payer: MEDICARE

## 2024-10-07 ENCOUNTER — TRANSCRIBE ORDERS (OUTPATIENT)
Dept: LAB | Facility: HOSPITAL | Age: 89
End: 2024-10-07
Payer: MEDICARE

## 2024-10-07 DIAGNOSIS — D64.9 ANEMIA, UNSPECIFIED TYPE: ICD-10-CM

## 2024-10-07 DIAGNOSIS — R06.00 DYSPNEA, UNSPECIFIED TYPE: ICD-10-CM

## 2024-10-07 DIAGNOSIS — R06.00 DYSPNEA, UNSPECIFIED TYPE: Primary | ICD-10-CM

## 2024-10-07 LAB
ALBUMIN SERPL-MCNC: 3.6 G/DL (ref 3.5–5.2)
ALBUMIN/GLOB SERPL: 1.2 G/DL
ALP SERPL-CCNC: 99 U/L (ref 39–117)
ALT SERPL W P-5'-P-CCNC: 18 U/L (ref 1–41)
ANION GAP SERPL CALCULATED.3IONS-SCNC: 9 MMOL/L (ref 5–15)
AST SERPL-CCNC: 22 U/L (ref 1–40)
BASOPHILS # BLD AUTO: 0.07 10*3/MM3 (ref 0–0.2)
BASOPHILS NFR BLD AUTO: 1 % (ref 0–1.5)
BILIRUB SERPL-MCNC: 0.5 MG/DL (ref 0–1.2)
BUN SERPL-MCNC: 19 MG/DL (ref 8–23)
BUN/CREAT SERPL: 16 (ref 7–25)
CALCIUM SPEC-SCNC: 9.3 MG/DL (ref 8.6–10.5)
CHLORIDE SERPL-SCNC: 106 MMOL/L (ref 98–107)
CO2 SERPL-SCNC: 24 MMOL/L (ref 22–29)
CREAT SERPL-MCNC: 1.19 MG/DL (ref 0.76–1.27)
DEPRECATED RDW RBC AUTO: 49.4 FL (ref 37–54)
EGFRCR SERPLBLD CKD-EPI 2021: 59.5 ML/MIN/1.73
EOSINOPHIL # BLD AUTO: 0.65 10*3/MM3 (ref 0–0.4)
EOSINOPHIL NFR BLD AUTO: 9.1 % (ref 0.3–6.2)
ERYTHROCYTE [DISTWIDTH] IN BLOOD BY AUTOMATED COUNT: 13.2 % (ref 12.3–15.4)
FERRITIN SERPL-MCNC: 1270 NG/ML (ref 30–400)
GLOBULIN UR ELPH-MCNC: 2.9 GM/DL
GLUCOSE SERPL-MCNC: 157 MG/DL (ref 65–99)
HCT VFR BLD AUTO: 33.8 % (ref 37.5–51)
HGB BLD-MCNC: 10.3 G/DL (ref 13–17.7)
IMM GRANULOCYTES # BLD AUTO: 0.04 10*3/MM3 (ref 0–0.05)
IMM GRANULOCYTES NFR BLD AUTO: 0.6 % (ref 0–0.5)
IRON 24H UR-MRATE: 43 MCG/DL (ref 59–158)
IRON SATN MFR SERPL: 16 % (ref 20–50)
LYMPHOCYTES # BLD AUTO: 0.77 10*3/MM3 (ref 0.7–3.1)
LYMPHOCYTES NFR BLD AUTO: 10.8 % (ref 19.6–45.3)
MCH RBC QN AUTO: 30.9 PG (ref 26.6–33)
MCHC RBC AUTO-ENTMCNC: 30.5 G/DL (ref 31.5–35.7)
MCV RBC AUTO: 101.5 FL (ref 79–97)
MONOCYTES # BLD AUTO: 0.75 10*3/MM3 (ref 0.1–0.9)
MONOCYTES NFR BLD AUTO: 10.5 % (ref 5–12)
NEUTROPHILS NFR BLD AUTO: 4.87 10*3/MM3 (ref 1.7–7)
NEUTROPHILS NFR BLD AUTO: 68 % (ref 42.7–76)
NRBC BLD AUTO-RTO: 0 /100 WBC (ref 0–0.2)
NT-PROBNP SERPL-MCNC: 1080 PG/ML (ref 0–1800)
PLATELET # BLD AUTO: 290 10*3/MM3 (ref 140–450)
PMV BLD AUTO: 9.5 FL (ref 6–12)
POTASSIUM SERPL-SCNC: 4.6 MMOL/L (ref 3.5–5.2)
PROT SERPL-MCNC: 6.5 G/DL (ref 6–8.5)
RBC # BLD AUTO: 3.33 10*6/MM3 (ref 4.14–5.8)
SODIUM SERPL-SCNC: 139 MMOL/L (ref 136–145)
TIBC SERPL-MCNC: 267 MCG/DL (ref 298–536)
TRANSFERRIN SERPL-MCNC: 179 MG/DL (ref 200–360)
TSH SERPL DL<=0.05 MIU/L-ACNC: 2.84 UIU/ML (ref 0.27–4.2)
WBC NRBC COR # BLD AUTO: 7.15 10*3/MM3 (ref 3.4–10.8)

## 2024-10-07 PROCEDURE — 85025 COMPLETE CBC W/AUTO DIFF WBC: CPT

## 2024-10-07 PROCEDURE — 82728 ASSAY OF FERRITIN: CPT

## 2024-10-07 PROCEDURE — 83540 ASSAY OF IRON: CPT

## 2024-10-07 PROCEDURE — 83880 ASSAY OF NATRIURETIC PEPTIDE: CPT

## 2024-10-07 PROCEDURE — 84466 ASSAY OF TRANSFERRIN: CPT

## 2024-10-07 PROCEDURE — 36415 COLL VENOUS BLD VENIPUNCTURE: CPT

## 2024-10-07 PROCEDURE — 80053 COMPREHEN METABOLIC PANEL: CPT

## 2024-10-07 PROCEDURE — 84443 ASSAY THYROID STIM HORMONE: CPT

## 2024-10-08 ENCOUNTER — TELEPHONE (OUTPATIENT)
Dept: CARDIOLOGY | Facility: CLINIC | Age: 89
End: 2024-10-08
Payer: MEDICARE

## 2024-10-08 NOTE — TELEPHONE ENCOUNTER
EK pt-LOV 5/2024.    Patient's daughter is calling on behalf of patient to get him a follow up appt with Dr. Garduno or APRN.  Patient has been dx with CHF by PCP, Dr. Cedillo, a couple of months ago and was started on 20 mg of furosemide daily.  Pt can take extra dose of furosemide PRN for weight gain of 3 lbs.  Patient is still c/o SOA.  He recently had labs done and iron is low and he was referred to heme/onc.  He has not seen them yet.  His weight fluctuates.  Dry weight is 140 and he has been between 139-147.  I offered them Dr. Garduno' first available 10/18 or an appt this week with APRN.  Daughter feels patient can wait to see EK.  They have been scheduled 10/18.  Advised daughter to call for worsening symptoms and we can get him in with APRN.  She verbalizes understanding and agrees with plan.    [facility] Triage  10/08/24 16:26 EDT

## 2024-10-18 ENCOUNTER — OFFICE VISIT (OUTPATIENT)
Dept: CARDIOLOGY | Facility: CLINIC | Age: 89
End: 2024-10-18
Payer: MEDICARE

## 2024-10-18 VITALS
HEIGHT: 67 IN | DIASTOLIC BLOOD PRESSURE: 82 MMHG | SYSTOLIC BLOOD PRESSURE: 132 MMHG | HEART RATE: 63 BPM | BODY MASS INDEX: 22.98 KG/M2 | WEIGHT: 146.4 LBS | OXYGEN SATURATION: 93 %

## 2024-10-18 DIAGNOSIS — I10 PRIMARY HYPERTENSION: ICD-10-CM

## 2024-10-18 DIAGNOSIS — I50.33 ACUTE ON CHRONIC DIASTOLIC CHF (CONGESTIVE HEART FAILURE): Primary | ICD-10-CM

## 2024-10-18 DIAGNOSIS — D63.8 ANEMIA, CHRONIC DISEASE: ICD-10-CM

## 2024-10-18 DIAGNOSIS — I50.33 ACUTE ON CHRONIC DIASTOLIC CHF (CONGESTIVE HEART FAILURE): ICD-10-CM

## 2024-10-18 DIAGNOSIS — E78.5 HYPERLIPIDEMIA, UNSPECIFIED HYPERLIPIDEMIA TYPE: ICD-10-CM

## 2024-10-18 DIAGNOSIS — I49.3 PVC (PREMATURE VENTRICULAR CONTRACTION): ICD-10-CM

## 2024-10-18 DIAGNOSIS — D50.9 IRON DEFICIENCY ANEMIA, UNSPECIFIED IRON DEFICIENCY ANEMIA TYPE: ICD-10-CM

## 2024-10-18 RX ORDER — FUROSEMIDE 20 MG
20 TABLET ORAL 2 TIMES DAILY
COMMUNITY
End: 2024-10-18

## 2024-10-18 NOTE — PATIENT INSTRUCTIONS
"Shortness of breath can be due to a lot of different reasons.  There may be a small component of something we call \"diastolic heart failure\" which is some age-related stiffness of the heart muscle, and then when he few other things on top of it like low blood counts/anemia, premature heartbeats called PVCs, it can cause the body to retain fluid.    Lasix can help treat this, but it is not a solution.    I think we can replace the Lasix medication with a drug called Jardiance.  This is a 10 mg tablet taken once per day which helps prevent fluid from accumulating.  However, you are peeing out blood sugar with this medication and it can increase the risk of UTIs and yeast infections.  But I think we should try it.  If you do okay on this medication with the samples, you can  the new prescription from the pharmacy.  You can take this every day no matter how you feel and prevents fluid from accumulating in the first place.    We should repeat a heart monitor to reassess how frequently these premature heartbeats/PVCs are occurring, and we may need to start a medication called amiodarone to suppress them.  "

## 2024-10-18 NOTE — PROGRESS NOTES
Subjective:     Encounter Date:10/18/24      Patient ID: Juan C Ca is a 88 y.o. male.    Chief Complaint:   Chief Complaint   Patient presents with    PVC (premature venticular contaction)     Patient is in the office today to establish care for CHF.       Hypertension    Hyperlipidemia        Mr. Ca is an 88 y.o. gentleman past medical history of rheumatoid arthritis, remote TBI with mild cognitive impairment, subsequent seizure disorder who presents for further evaluation of dizziness in the setting of a Holter with large PVC burden.    He was seen by Dr. Coppola in our office, and the PVCs were thought to be incidental.  Medications were discontinued.  He underwent a CT chest without contrast by his primary care Dr. Cedillo.He is having some worsening dyspnea.  There were small bilateral pleural effusions on the CT chest, as well as some nodularity in the lingula which may be infectious and inflammatory.      Cardiac testing:  Stress test PET myocardial perfusion January 2023:    Findings consistent with a normal ECG stress test. Frequent monomorphic PVC singlets and couplets noted throughout study.  No ventricular runs noted.    Left ventricular ejection fraction is normal (Calculated EF = 70%).    Myocardial perfusion imaging indicates a normal myocardial perfusion study with no evidence of ischemia.    There is no prior study available for comparison.    Echo July 5, 2024:     Left ventricular systolic function is normal. Calculated left ventricular EF = 61.8% Left ventricular ejection fraction appears to be 61 - 65%.    Left ventricular diastolic function was indeterminate.    The left atrial cavity is mildly dilated.    Estimated right ventricular systolic pressure from tricuspid regurgitation is normal (<35 mmHg). Calculated right ventricular systolic pressure from tricuspid regurgitation is 25 mmHg.    Patient presents today with his daughter who corroborates history.  States that over the  past several years he had intermittent dizzy spells been worked up by neurology.  He states that sensation of lightheadedness that occurs sporadically, sometimes exertional, and sometimes it changes with position.  He denies overt vertigo-like sensations.  He has had a rather extensive neurology work-up which has been unrevealing.    He had issues with labile hypertension as well.  At last visit, he actually was evaluated Dr. Riley with cardiac EP given his intermittent dizzy spells with PVCs.  At that time, it was felt that his symptoms are unlikely related to his PVCs, he has a normal echo, and normal stress test, and these were best managed conservatively.  In fact, no management was really needed at all.  These are likely incidental.    He was instructed to consider stopping his metoprolol, however he had some more labile hypertension after being off of it, and has been maintained on metoprolol and his blood pressures have been well controlled.  On evaluation today, he denies new complaints.  No chest pains, no shortness of breath.  Some of the gait unsteadiness persists, but is not particularly new or worse       The following portions of the patient's history were reviewed and updated as appropriate: allergies, current medications, past family history, past medical history, past social history, past surgical history and problem list.    Past Medical History:   Diagnosis Date    Cancer     skin cancer    CVD (cerebrovascular disease)     with remote infarcts per CT of head.    H/O Iron deficiency anemia     H/O Traumatic brain injury (CMS/HCC)     H/O: pneumonia 04/2015    Hyperlipidemia     Hypertension     CRISTAL (obstructive sleep apnea)     Premature atrial contraction     Rheumatoid arthritis     Seizures     Started in 1960s.       Past Surgical History:   Procedure Laterality Date    APPENDECTOMY  1950    CATARACT EXTRACTION      COLONOSCOPY N/A 12/12/2017    Procedure: COLONOSCOPY TO CECUM AND TI WITH  "APC CAUTERY OF RIGHT COLON AVM ;  Surgeon: Lucio Stein MD;  Location: [facility] ENDOSCOPY;  Service:     ENDOSCOPY N/A 12/12/2017    Procedure: ESOPHAGOGASTRODUODENOSCOPY with BX ;  Surgeon: Lucio Stein MD;  Location: [facility] ENDOSCOPY;  Service:            ECG 12 Lead    Date/Time: 10/18/2024 10:21 AM  Performed by: Som Garduno MD    Authorized by: Som Garduno MD  Comparison: compared with previous ECG from 5/13/2024  Comparison to previous ECG: PVCs more prevalent on today's ECG  Rhythm: sinus rhythm  Ectopy: unifocal PVCs and bigeminy  Rate: normal  Conduction: left anterior fascicular block  QRS axis: normal    Clinical impression: abnormal EKG             Objective:     Vitals:    10/18/24 0901   BP: 132/82   BP Location: Left arm   Patient Position: Sitting   Pulse: 63   SpO2: 93%   Weight: 66.4 kg (146 lb 6.4 oz)   Height: 170.2 cm (67\")           Constitutional:       Appearance: Healthy appearance. Not in distress.   Neck:      Vascular: JVD normal.   Pulmonary:      Effort: Pulmonary effort is normal.      Breath sounds: Normal breath sounds.   Cardiovascular:      PMI at left midclavicular line. Normal rate. Regularly irregular rhythm. Normal S2.       Murmurs: There is no murmur.   Pulses:     Intact distal pulses.   Edema:     Peripheral edema absent.   Musculoskeletal:      Comments: Gait unsteadiness noted. Skin:     General: Skin is warm and dry.   Neurological:      General: No focal deficit present.      Mental Status: Alert, oriented to person, place, and time and oriented to person, place and time.   Psychiatric:         Mood and Affect: Mood and affect normal.         Lab Review:       BUN   Date Value Ref Range Status   10/07/2024 19 8 - 23 mg/dL Final     Creatinine   Date Value Ref Range Status   10/07/2024 1.19 0.76 - 1.27 mg/dL Final     Potassium   Date Value Ref Range Status   10/07/2024 4.6 3.5 - 5.2 mmol/L Final     ALT (SGPT)   Date Value Ref Range Status   10/07/2024 18 1 - " 41 U/L Final     AST (SGOT)   Date Value Ref Range Status   10/07/2024 22 1 - 40 U/L Final        Performed        Assessment:          Diagnosis Plan   1. Acute on chronic diastolic CHF (congestive heart failure)  Holter Monitor - 72 Hour Up To 15 Days      2. PVC (premature ventricular contraction)  Holter Monitor - 72 Hour Up To 15 Days               Plan:           Dizziness  Premature ventricular contractions  Dizziness is multifactorial, but PVCs could be contributing  He is briefly evaluated by cardiac EP and his symptoms were thought to be nonspecific, however this was a year ago.  Will repeat a 3-day ZIO, assess PVC burden  If it remains elevated, given his frailty, will start amiodarone.  Will trial amiodarone for 6 months to see if that helps symptoms,  Monitor TSH, reviewed recent TSH within the last month that was normal  Iron deficiency anemia, suspect  He has an upcoming follow-up with hematology, IV iron may be helpful, however he did have some dizziness my first time so I think PVCs may also be contributing  Metoprolol did not seem to help much   Suspected chronic diastolic CHF.  Echocardiogram was unremarkable May 2024  BMP was unremarkable  However, he did have some bilateral pleural effusions, and I think that given the anemia, PVCs, there may have been a component of CHF at play.  On exam today there is no evidence of CHF but he was on Lasix and his lower extremity swelling has resolved  We will stop Lasix and trial him on Jardiance.  He is incontinent of urine so Jardiance may ultimately lead to trouble but given the incontinence with the loop diuretic I think the risk-benefit ratio favors trialing Jardiance.  Follow him clinically.  Return arthritis.  Seizure disorder.  History of TBI          RTC 2 months with nurse practitioner.  Will obtain a 3-day Zio patch, and if his PVCs remain elevated we will try him on amiodarone 200 daily to see if that suppresses PVCs and improves his symptoms of  dizziness.  He does appear euvolemic today.  Trialing him on Jardiance per above.      Current Outpatient Medications:     acetaminophen (TYLENOL) 500 MG tablet, Take 2 tablets by mouth Every 6 (Six) Hours As Needed for Mild Pain  or Fever., Disp:  , Rfl:     aspirin 81 MG EC tablet, Take 1 tablet by mouth Daily. Indications: Buildup of Plaques in Large Arteries Leading to the Brain, Disp: , Rfl:     cetirizine (zyrTEC) 10 MG tablet, Take 0.5 tablets by mouth Daily., Disp: , Rfl:     docusate sodium (COLACE) 100 MG capsule, Take 1 capsule by mouth 2 (Two) Times a Day. PRN  Indications: Constipation, Disp: , Rfl:     famotidine (PEPCID) 20 MG tablet, Take 1 tablet by mouth Daily., Disp: 30 tablet, Rfl: 3    hydroxychloroquine (PLAQUENIL) 200 MG tablet, Take 1 tablet by mouth Daily. Indications: Rheumatoid Arthritis, Disp: , Rfl:     leflunomide (ARAVA) 20 MG tablet, Take 1 tablet by mouth Daily. Indications: Rheumatoid Arthritis, Disp: , Rfl:     levETIRAcetam (KEPPRA) 500 MG tablet, Take 1 tablet by mouth 2 (Two) Times a Day. Indications: Seizure, Disp: , Rfl:     levothyroxine (SYNTHROID, LEVOTHROID) 25 MCG tablet, Take 1 tablet by mouth Daily. Indications: Underactive Thyroid, Disp: , Rfl:     memantine (NAMENDA) 10 MG tablet, Take 1 tablet by mouth Daily. 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 Indications: Urinary Incontinence, Disp: , Rfl:     Multiple Vitamin (MULTI VITAMIN MENS PO), Take 1 tablet by mouth Daily. Indications: Nutritional Support, Disp: , Rfl:     multivitamins-minerals (PRESERVISION AREDS 2) capsule capsule, , Disp: , Rfl:     tamsulosin (FLOMAX) 0.4 MG capsule 24 hr capsule, Take 1 capsule by mouth every night., Disp: 30 capsule, Rfl: 0     vitamin D3 125 MCG (5000 UT) capsule capsule, Indications: Vitamin D Deficiency, Disp: , Rfl:     empagliflozin (Jardiance) 10 MG tablet tablet, Take 1 tablet by mouth Daily., Disp: 30 tablet, Rfl: 11         Return in about 2 months (around 12/18/2024).      **Harlan Disclaimer:   Much of this encounter note is an electronic transcription/translation of spoken language to printed text. The electronic translation of spoken language may permit erroneous, or at times, nonsensical words or phrases to be inadvertently transcribed. Although I have reviewed the note for such errors, some may still exist.

## 2024-10-23 NOTE — PROGRESS NOTES
Appointment (New hematology patient)        REASON FOR CONSULTATION: Anemia                               REQUESTING PHYSICIAN: Tej Cedillo Jr.,*  RECORDS OBTAINED:  Records of the patients history including those from the electronic medical record were reviewed and summarized in detail.    HISTORY OF PRESENT ILLNESS:  The patient is a 88 y.o. year old male with hypertension, hyperlipidemia, obstructive sleep apnea, premature ventricular contractions, referred to our clinic for evaluation management of anemia.  Upon review of labs, his hemoglobin from October 7, 2024 was 10.3, iron saturation 16%, ferritin 1270.  He was previously seen by Dr. Novoa from our group in 2020.  He was given IV Injectafer x 2.  Today, he is here accompanied by his son for evaluation of anemia.  Son reports patient had episodes of dizziness, which prompted anemia evaluation.  No active bleeding.      Past Medical History:   Diagnosis Date    Cancer     skin cancer    CVD (cerebrovascular disease)     with remote infarcts per CT of head.    H/O Iron deficiency anemia     H/O Traumatic brain injury (CMS/HCC)     H/O: pneumonia 04/2015    Hyperlipidemia     Hypertension     CRISTAL (obstructive sleep apnea)     Premature atrial contraction     Rheumatoid arthritis     Seizures     Started in 1960s.     Past Surgical History:   Procedure Laterality Date    APPENDECTOMY  1950    CATARACT EXTRACTION      COLONOSCOPY N/A 12/12/2017    Procedure: COLONOSCOPY TO CECUM AND TI WITH APC CAUTERY OF RIGHT COLON AVM ;  Surgeon: Lucio Stein MD;  Location: [facility] ENDOSCOPY;  Service:     ENDOSCOPY N/A 12/12/2017    Procedure: ESOPHAGOGASTRODUODENOSCOPY with BX ;  Surgeon: Lucio Stein MD;  Location: [facility] ENDOSCOPY;  Service:        MEDICATIONS    Current Outpatient Medications:     acetaminophen (TYLENOL) 500 MG tablet, Take 2 tablets by mouth Every 6 (Six) Hours As Needed for Mild Pain  or Fever., Disp:  , Rfl:     aspirin 81 MG EC  tablet, Take 1 tablet by mouth Daily. Indications: Buildup of Plaques in Large Arteries Leading to the Brain, Disp: , Rfl:     cetirizine (zyrTEC) 10 MG tablet, Take 0.5 tablets by mouth Daily., Disp: , Rfl:     docusate sodium (COLACE) 100 MG capsule, Take 1 capsule by mouth 2 (Two) Times a Day. PRN  Indications: Constipation, Disp: , Rfl:     empagliflozin (Jardiance) 10 MG tablet tablet, Take 1 tablet by mouth Daily., Disp: 30 tablet, Rfl: 11    famotidine (PEPCID) 20 MG tablet, Take 1 tablet by mouth Daily., Disp: 30 tablet, Rfl: 3    hydroxychloroquine (PLAQUENIL) 200 MG tablet, Take 1 tablet by mouth Daily. Indications: Rheumatoid Arthritis, Disp: , Rfl:     leflunomide (ARAVA) 20 MG tablet, Take 1 tablet by mouth Daily. Indications: Rheumatoid Arthritis, Disp: , Rfl:     levETIRAcetam (KEPPRA) 500 MG tablet, Take 1 tablet by mouth 2 (Two) Times a Day. Indications: Seizure, Disp: , Rfl:     levothyroxine (SYNTHROID, LEVOTHROID) 25 MCG tablet, Take 1 tablet by mouth Daily. Indications: Underactive Thyroid, Disp: , Rfl:     memantine (NAMENDA) 10 MG tablet, Take 1 tablet by mouth Daily. 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 Indications: Urinary Incontinence, Disp: , Rfl:     Multiple Vitamin (MULTI VITAMIN MENS PO), Take 1 tablet by mouth Daily. Indications: Nutritional Support, Disp: , Rfl:     multivitamins-minerals (PRESERVISION AREDS 2) capsule capsule, , Disp: , Rfl:     tamsulosin (FLOMAX) 0.4 MG capsule 24 hr capsule, Take 1 capsule by mouth every night., Disp: 30 capsule, Rfl: 0    vitamin D3 125 MCG (5000 UT) capsule capsule, Indications: Vitamin D Deficiency, Disp: , Rfl:     ALLERGIES:     Allergies   Allergen Reactions    Sulfadiazine Unknown -  "High Severity     fever  Other reaction(s): Fever       SOCIAL HISTORY:       Social History     Socioeconomic History    Marital status:      Spouse name: Abbie    Years of education: College   Tobacco Use    Smoking status: Former     Current packs/day: 0.00     Types: Cigarettes     Start date: 1972     Quit date: 1987     Years since quittin.8     Passive exposure: Past    Smokeless tobacco: Never    Tobacco comments:     Caffeine - coffee and tea    Vaping Use    Vaping status: Never Used   Substance and Sexual Activity    Alcohol use: No    Drug use: No    Sexual activity: Yes     Partners: Female     Birth control/protection: None         FAMILY HISTORY:  Family History   Problem Relation Age of Onset    Colon cancer Other     Hypertension Mother     Mental illness Mother     Hypertension Father     Heart disease Sister     Hypertension Sister     Colon cancer Sister     Skin cancer Sister     Heart disease Brother     Hypertension Brother     Lung cancer Brother     Diabetes Daughter     Throat cancer Brother     Cancer Brother         Bladder    Prostate cancer Brother        REVIEW OF SYSTEMS:  Review of Systems   Constitutional: Negative.    Respiratory: Negative.     Cardiovascular: Negative.    Gastrointestinal: Negative.    Neurological:  Positive for dizziness.   Hematological: Negative.               Vitals:    10/24/24 1442   BP: 130/62   Pulse: 77   Temp: 92.2 °F (33.4 °C)   TempSrc: Oral   SpO2: 92%   Weight: 66.7 kg (147 lb 1.6 oz)   Height: 170.2 cm (67.01\")   PainSc: 0-No pain         10/24/2024     2:50 PM   Current Status   ECOG score 1        PHYSICAL EXAM:    CONSTITUTIONAL:  Vital signs reviewed.  No distress, looks comfortable.  RESPIRATORY:  Normal respiratory effort.  Lungs clear to auscultation bilaterally.  CARDIOVASCULAR:  Normal S1, S2.  No murmurs rubs or gallops.  No significant lower extremity edema.  GASTROINTESTINAL: Abdomen appears unremarkable.  " Nontender.      RECENT LABS:        WBC   Date Value Ref Range Status   10/24/2024 6.81 3.40 - 10.80 10*3/mm3 Final   10/07/2024 7.15 3.40 - 10.80 10*3/mm3 Final   02/22/2024 3.70 3.40 - 10.80 10*3/mm3 Final   02/02/2024 3.13 (L) 3.40 - 10.80 10*3/mm3 Final   02/01/2024 4.52 3.40 - 10.80 10*3/mm3 Final   01/31/2024 5.08 3.40 - 10.80 10*3/mm3 Final   08/09/2023 6.10 3.40 - 10.80 10*3/mm3 Final   12/19/2022 5.87 3.40 - 10.80 10*3/mm3 Final   01/06/2021 4.40 3.40 - 10.80 10*3/mm3 Final   01/05/2021 6.41 3.40 - 10.80 10*3/mm3 Final   01/04/2021 4.51 3.40 - 10.80 10*3/mm3 Final   01/03/2021 2.00 (L) 3.40 - 10.80 10*3/mm3 Final   01/02/2021 3.73 3.40 - 10.80 10*3/mm3 Final   01/01/2021 5.35 3.40 - 10.80 10*3/mm3 Final   12/31/2020 6.49 3.40 - 10.80 10*3/mm3 Final   11/23/2020 6.69 3.40 - 10.80 10*3/mm3 Final   05/04/2020 9.72 3.40 - 10.80 10*3/mm3 Final   11/18/2019 11.07 (H) 3.40 - 10.80 10*3/mm3 Final   09/23/2019 9.84 3.40 - 10.80 10*3/mm3 Final   06/18/2019 6.84 3.40 - 10.80 10*3/mm3 Final   06/17/2019 7.18 3.40 - 10.80 10*3/mm3 Final   06/16/2019 6.62 3.40 - 10.80 10*3/mm3 Final   06/15/2019 9.44 3.40 - 10.80 10*3/mm3 Final   06/14/2019 10.98 (H) 3.40 - 10.80 10*3/mm3 Final   06/13/2019 10.93 (H) 3.40 - 10.80 10*3/mm3 Final   05/06/2019 8.65 3.40 - 10.80 10*3/mm3 Final   04/08/2019 11.16 (H) 3.40 - 10.80 10*3/mm3 Final   05/15/2015 4.69 4.50 - 10.70 K/Cumm Final   05/14/2015 5.98 4.50 - 10.70 K/Cumm Final   05/13/2015 7.52 4.50 - 10.70 K/Cumm Final     Hemoglobin   Date Value Ref Range Status   10/24/2024 11.5 (L) 13.0 - 17.7 g/dL Final   10/07/2024 10.3 (L) 13.0 - 17.7 g/dL Final   02/22/2024 10.6 (L) 13.0 - 17.7 g/dL Final   02/02/2024 9.9 (L) 13.0 - 17.7 g/dL Final   02/01/2024 10.0 (L) 13.0 - 17.7 g/dL Final   01/31/2024 9.8 (L) 13.0 - 17.7 g/dL Final   08/09/2023 12.0 (L) 13.0 - 17.7 g/dL Final   12/19/2022 11.5 (L) 13.0 - 17.7 g/dL Final   01/06/2021 11.7 (L) 13.0 - 17.7 g/dL Final   01/05/2021 11.8 (L) 13.0 -  17.7 g/dL Final   01/04/2021 11.6 (L) 13.0 - 17.7 g/dL Final   01/03/2021 12.4 (L) 13.0 - 17.7 g/dL Final   01/02/2021 11.4 (L) 13.0 - 17.7 g/dL Final   01/01/2021 11.9 (L) 13.0 - 17.7 g/dL Final   12/31/2020 12.9 (L) 13.0 - 17.7 g/dL Final   11/23/2020 12.9 (L) 13.0 - 17.7 g/dL Final   05/04/2020 13.1 13.0 - 17.7 g/dL Final   11/18/2019 12.8 (L) 13.0 - 17.7 g/dL Final   09/23/2019 13.7 13.0 - 17.7 g/dL Final   06/18/2019 11.3 (L) 13.0 - 17.7 g/dL Final   06/17/2019 11.4 (L) 13.0 - 17.7 g/dL Final   06/16/2019 10.7 (L) 13.0 - 17.7 g/dL Final   06/15/2019 12.6 (L) 13.0 - 17.7 g/dL Final   06/14/2019 13.6 13.0 - 17.7 g/dL Final   06/13/2019 13.2 13.0 - 17.7 g/dL Final   05/06/2019 11.7 (L) 13.0 - 17.7 g/dL Final   04/08/2019 12.4 (L) 13.0 - 17.7 g/dL Final   05/15/2015 9.6 (L) 13.7 - 17.6 g/dL Final   05/14/2015 11.1 (L) 13.7 - 17.6 g/dL Final   05/13/2015 11.4 (L) 13.7 - 17.6 g/dL Final     Platelets   Date Value Ref Range Status   10/24/2024 200 140 - 450 10*3/mm3 Final   10/07/2024 290 140 - 450 10*3/mm3 Final   02/22/2024 152 140 - 450 10*3/mm3 Final   02/02/2024 126 (L) 140 - 450 10*3/mm3 Final   02/01/2024 132 (L) 140 - 450 10*3/mm3 Final   01/31/2024 121 (L) 140 - 450 10*3/mm3 Final   08/09/2023 168 140 - 450 10*3/mm3 Final   12/19/2022 138 (L) 140 - 450 10*3/mm3 Final   01/06/2021 175 140 - 450 10*3/mm3 Final   01/05/2021 167 140 - 450 10*3/mm3 Final   01/04/2021 135 (L) 140 - 450 10*3/mm3 Final   01/03/2021 125 (L) 140 - 450 10*3/mm3 Final   01/02/2021 119 (L) 140 - 450 10*3/mm3 Final   01/01/2021 124 (L) 140 - 450 10*3/mm3 Final   12/31/2020 158 140 - 450 10*3/mm3 Final   11/23/2020 174 140 - 450 10*3/mm3 Final   05/04/2020 222 140 - 450 10*3/mm3 Final   11/18/2019 251 140 - 450 10*3/mm3 Final   09/23/2019 233 140 - 450 10*3/mm3 Final   06/18/2019 188 140 - 450 10*3/mm3 Final   06/17/2019 176 140 - 450 10*3/mm3 Final   06/16/2019 146 140 - 450 10*3/mm3 Final   06/15/2019 163 140 - 450 10*3/mm3 Final    06/14/2019 196 140 - 450 10*3/mm3 Final   06/13/2019 196 140 - 450 10*3/mm3 Final   05/06/2019 313 140 - 450 10*3/mm3 Final   04/08/2019 241 140 - 450 10*3/mm3 Final   05/15/2015 210 140 - 500 K/Cumm Final   05/14/2015 179 140 - 500 K/Cumm Final   05/13/2015 228 140 - 500 K/Cumm Final     Assessment:    *Macrocytic anemia/anemia of chronic disease   Upon review of labs, his hemoglobin from October 7, 2024 was 10.3, iron saturation 16%, ferritin 1270.    He was previously seen by Dr. Novoa from our group in 2020.  He was given IV Injectafer x 2.  GI workup for iron deficiency was unremarkable.  10/24/2024: Hemoglobin improved to 11.5.  Reticulocyte hemoglobin 34%.  Discussed with the patient he is anemia is likely secondary to anemia of chronic disease.  However, will do a complete lab eval of anemia.    *Rheumatoid arthritis  On leflunomide, hydroxychloroquine    Plan  Obtain iron, ferritin, reticulocyte hemoglobin, B12, folate, LDH, haptoglobin today  Discussed dizziness unlikely related to anemia  Return to clinic for NP visit in 6 months.  If hemoglobin stable, we can see him on an as-needed basis after that

## 2024-10-24 ENCOUNTER — CONSULT (OUTPATIENT)
Dept: ONCOLOGY | Facility: CLINIC | Age: 89
End: 2024-10-24
Payer: MEDICARE

## 2024-10-24 ENCOUNTER — LAB (OUTPATIENT)
Dept: LAB | Facility: HOSPITAL | Age: 89
End: 2024-10-24
Payer: MEDICARE

## 2024-10-24 VITALS
WEIGHT: 147.1 LBS | TEMPERATURE: 92.2 F | HEIGHT: 67 IN | SYSTOLIC BLOOD PRESSURE: 130 MMHG | HEART RATE: 77 BPM | DIASTOLIC BLOOD PRESSURE: 62 MMHG | OXYGEN SATURATION: 92 % | BODY MASS INDEX: 23.09 KG/M2

## 2024-10-24 DIAGNOSIS — D50.9 IRON DEFICIENCY ANEMIA, UNSPECIFIED IRON DEFICIENCY ANEMIA TYPE: ICD-10-CM

## 2024-10-24 DIAGNOSIS — R79.89 ABNORMAL CBC: Primary | ICD-10-CM

## 2024-10-24 DIAGNOSIS — D50.9 IRON DEFICIENCY ANEMIA, UNSPECIFIED IRON DEFICIENCY ANEMIA TYPE: Primary | ICD-10-CM

## 2024-10-24 LAB
BASOPHILS # BLD AUTO: 0.08 10*3/MM3 (ref 0–0.2)
BASOPHILS NFR BLD AUTO: 1.2 % (ref 0–1.5)
DAT POLY-SP REAG RBC QL: NEGATIVE
DEPRECATED RDW RBC AUTO: 50.7 FL (ref 37–54)
EOSINOPHIL # BLD AUTO: 0.57 10*3/MM3 (ref 0–0.4)
EOSINOPHIL NFR BLD AUTO: 8.4 % (ref 0.3–6.2)
ERYTHROCYTE [DISTWIDTH] IN BLOOD BY AUTOMATED COUNT: 14.3 % (ref 12.3–15.4)
FERRITIN SERPL-MCNC: 876 NG/ML (ref 30–400)
FOLATE SERPL-MCNC: >20 NG/ML (ref 4.78–24.2)
HAPTOGLOB SERPL-MCNC: 188 MG/DL (ref 30–200)
HCT VFR BLD AUTO: 34.8 % (ref 37.5–51)
HGB BLD-MCNC: 11.5 G/DL (ref 13–17.7)
HGB RETIC QN AUTO: 34 PG (ref 29.8–36.1)
IMM GRANULOCYTES # BLD AUTO: 0.12 10*3/MM3 (ref 0–0.05)
IMM GRANULOCYTES NFR BLD AUTO: 1.8 % (ref 0–0.5)
IMM RETICS NFR: 11.4 % (ref 3–15.8)
IRON 24H UR-MRATE: 55 MCG/DL (ref 59–158)
IRON SATN MFR SERPL: 18 % (ref 20–50)
LDH SERPL-CCNC: 309 U/L (ref 135–225)
LYMPHOCYTES # BLD AUTO: 1.38 10*3/MM3 (ref 0.7–3.1)
LYMPHOCYTES NFR BLD AUTO: 20.3 % (ref 19.6–45.3)
MCH RBC QN AUTO: 32.1 PG (ref 26.6–33)
MCHC RBC AUTO-ENTMCNC: 33 G/DL (ref 31.5–35.7)
MCV RBC AUTO: 97.2 FL (ref 79–97)
MONOCYTES # BLD AUTO: 0.93 10*3/MM3 (ref 0.1–0.9)
MONOCYTES NFR BLD AUTO: 13.7 % (ref 5–12)
NEUTROPHILS NFR BLD AUTO: 3.73 10*3/MM3 (ref 1.7–7)
NEUTROPHILS NFR BLD AUTO: 54.6 % (ref 42.7–76)
NRBC BLD AUTO-RTO: 0 /100 WBC (ref 0–0.2)
PLATELET # BLD AUTO: 200 10*3/MM3 (ref 140–450)
PMV BLD AUTO: 10.6 FL (ref 6–12)
RBC # BLD AUTO: 3.58 10*6/MM3 (ref 4.14–5.8)
RETICS # AUTO: 0.09 10*6/MM3 (ref 0.02–0.13)
RETICS/RBC NFR AUTO: 2.43 % (ref 0.7–1.9)
TIBC SERPL-MCNC: 301 MCG/DL (ref 298–536)
TRANSFERRIN SERPL-MCNC: 202 MG/DL (ref 200–360)
VIT B12 BLD-MCNC: 942 PG/ML (ref 211–946)
WBC NRBC COR # BLD AUTO: 6.81 10*3/MM3 (ref 3.4–10.8)

## 2024-10-24 PROCEDURE — 85046 RETICYTE/HGB CONCENTRATE: CPT | Performed by: STUDENT IN AN ORGANIZED HEALTH CARE EDUCATION/TRAINING PROGRAM

## 2024-10-24 PROCEDURE — 86880 COOMBS TEST DIRECT: CPT | Performed by: STUDENT IN AN ORGANIZED HEALTH CARE EDUCATION/TRAINING PROGRAM

## 2024-10-24 PROCEDURE — 84466 ASSAY OF TRANSFERRIN: CPT | Performed by: STUDENT IN AN ORGANIZED HEALTH CARE EDUCATION/TRAINING PROGRAM

## 2024-10-24 PROCEDURE — 82607 VITAMIN B-12: CPT | Performed by: STUDENT IN AN ORGANIZED HEALTH CARE EDUCATION/TRAINING PROGRAM

## 2024-10-24 PROCEDURE — 82728 ASSAY OF FERRITIN: CPT | Performed by: STUDENT IN AN ORGANIZED HEALTH CARE EDUCATION/TRAINING PROGRAM

## 2024-10-24 PROCEDURE — 82746 ASSAY OF FOLIC ACID SERUM: CPT | Performed by: STUDENT IN AN ORGANIZED HEALTH CARE EDUCATION/TRAINING PROGRAM

## 2024-10-24 PROCEDURE — 36415 COLL VENOUS BLD VENIPUNCTURE: CPT

## 2024-10-24 PROCEDURE — 83010 ASSAY OF HAPTOGLOBIN QUANT: CPT | Performed by: STUDENT IN AN ORGANIZED HEALTH CARE EDUCATION/TRAINING PROGRAM

## 2024-10-24 PROCEDURE — 83615 LACTATE (LD) (LDH) ENZYME: CPT

## 2024-10-24 PROCEDURE — 83540 ASSAY OF IRON: CPT | Performed by: STUDENT IN AN ORGANIZED HEALTH CARE EDUCATION/TRAINING PROGRAM

## 2024-10-24 PROCEDURE — 85025 COMPLETE CBC W/AUTO DIFF WBC: CPT

## 2024-10-28 ENCOUNTER — TELEPHONE (OUTPATIENT)
Dept: CARDIOLOGY | Facility: CLINIC | Age: 89
End: 2024-10-28
Payer: MEDICARE

## 2024-10-28 RX ORDER — METOPROLOL SUCCINATE 25 MG/1
25 TABLET, EXTENDED RELEASE ORAL DAILY
Qty: 90 TABLET | Refills: 3 | Status: SHIPPED | OUTPATIENT
Start: 2024-10-28

## 2024-10-28 RX ORDER — AMIODARONE HYDROCHLORIDE 200 MG/1
200 TABLET ORAL DAILY
Qty: 30 TABLET | Refills: 5 | Status: SHIPPED | OUTPATIENT
Start: 2024-10-28

## 2024-10-28 NOTE — TELEPHONE ENCOUNTER
We just monitor with thyroid test every 6 months or so.  It will not cause any sort of acute issues.  Sometimes higher doses can cause GI upset but at 200 mg a day I doubt that will cause any significant side effects.  It is more just the long-term side effects of amiodarone we watch out for

## 2024-10-28 NOTE — TELEPHONE ENCOUNTER
I called and spoke to Hannah, patient's daughter, who manages his medical affairs.  She is allowed information per MARIZOL.    I let her know the details of Dr. Garduno' message and she verbalizes understanding.  She agrees with medication changes.    She wanted to let you know that pt did see hematology and they do not feel chronic anemia is contributing to symptoms.  They are not going to do any iron infusions.  Pt is to follow up there in 6 months.    Hannah also wanted me to ask you if there is any specific adverse effects he needs to watch out for on amio?  She read it can affect his thyroid, anything specific they need to do with that since he has low thyroid that is manages and controlled currently with medication.    Gillian Rangel RN  [facility] T[facility]  10/28/24 13:12 EDT     Statement Selected

## 2024-10-28 NOTE — TELEPHONE ENCOUNTER
Thank you. I called and spoke with Hannah and she verbalizes understanding.    Gillian Rangel RN  [facility] Triage  10/28/24 14:18 EDT

## 2024-10-28 NOTE — TELEPHONE ENCOUNTER
----- Message from Som Charmainechristiana sent at 10/28/2024 12:40 PM EDT -----  Please call let patient know that his heart monitor did return showing a lot of these premature beats from the bottom chamber of the heart, called PVCs.  It is hard to say if these are causing symptoms or not since they are occurring so frequently, but all the triggered events of dizziness did correlate with these PVCs.    I think we can make a few medication adjustment to see if it helps.  I like for him to start the medication called amiodarone.  This 200 mg tablet is taken once per day.  We discussed this in the clinic.  We will see him again in December, likely repeat heart monitor and if he feels better and the PVCs are suppressed we may have right answer.  If the PVCs are gone and his symptoms persist, then we note that the PVCs are not causing his problems.    I think since we are adding the amiodarone we can decrease his metoprolol medication.  I have sent a new formulation of the metoprolol to his pharmacy.  He is to stop the metoprolol tartrate which is twice per day.  We are replacing with metoprolol succinate which is a smaller dose taken once per day.  This might also help the dizziness.  Thank you very much.

## 2024-11-04 ENCOUNTER — TELEPHONE (OUTPATIENT)
Dept: CARDIOLOGY | Facility: CLINIC | Age: 89
End: 2024-11-04
Payer: MEDICARE

## 2024-11-04 NOTE — TELEPHONE ENCOUNTER
Hannah, Juan C Ca' daughter, called on behalf of patient to report symptoms.    Hannah reports patient was started on amiodarone 200 mg daily and metoprolol succinate XL 25 mg daily.  He took these medications for the first time on Sunday, between 8-9 am.  While at [facility] around 11 am, patient was complaining of throat tightness, with frequent coughing trying to clear his throat.  Hannah stated that patient was not short of breath.  She did try to get patient to go to ED for evaluation, however he refused.  Throat tightness continued until about 3-4 pm and then started to get better.    /99, HR 62 (11/3 at 5 pm)  /54, HR 66 (11/4 at 9 am)    Patient has not taken the amiodarone or metoprolol succinate yet today as they are wanting recommendations, given how patient responded yesterday.    Please let me know how you would like to proceed.    Thank you,  Virginie GARCIA RN  Triage Nurse [facility]  11/04/24  09:35 EST

## 2024-11-04 NOTE — TELEPHONE ENCOUNTER
Reviewed Dr. Garduno' message with Hannah and she verbalized understanding.  She states patient has an appointment with PCP on the 15th and she's not sure if patient can be seen before then.  Hannah is asking if patient should resume previous dose of metoprolol tartrate until he can get seen by either PCP/urgent care?    Please let me know how you would like to proceed.    Thank you,  Virginie GARCIA RN  Triage Nurse [facility]  11/04/24  10:19 EST

## 2024-11-04 NOTE — TELEPHONE ENCOUNTER
Those are not typical medication reactions to either of these medicines.  He needs to present to his PCP or urgent care to get checked out for other causes.  It is okay to stay off the medications until he gets checked out.

## 2024-11-04 NOTE — TELEPHONE ENCOUNTER
Reviewed recommendations with Hannah and she verbalized understanding of recommendations.  Requested update once patient sees PCP and Hannah stated she will do this.    Thank you,  Virginie GARCIA RN  Triage Nurse [facility]  11/04/24 10:44 EST

## 2024-11-04 NOTE — TELEPHONE ENCOUNTER
Patient can just resume the previous dose of metoprolol tartrate.  Think the patient should be seen sooner than the 15th.  The symptoms would probably resolve by then.  The side effect he described is very unlikely to be due to amiodarone, and I do not want this to be listed as an allergy if ultimately he had some sort of viral upper respiratory illness that caused his symptoms rather than a medication side effect that might ultimately help his PVCs.  He can stay off the amiodarone for now.

## 2024-12-20 ENCOUNTER — OFFICE VISIT (OUTPATIENT)
Dept: CARDIOLOGY | Facility: CLINIC | Age: 89
End: 2024-12-20
Payer: MEDICARE

## 2024-12-20 VITALS
HEART RATE: 64 BPM | HEIGHT: 67 IN | WEIGHT: 149 LBS | BODY MASS INDEX: 23.39 KG/M2 | SYSTOLIC BLOOD PRESSURE: 118 MMHG | DIASTOLIC BLOOD PRESSURE: 60 MMHG | OXYGEN SATURATION: 93 %

## 2024-12-20 DIAGNOSIS — I49.3 PVC (PREMATURE VENTRICULAR CONTRACTION): Primary | ICD-10-CM

## 2024-12-20 PROCEDURE — 1159F MED LIST DOCD IN RCRD: CPT | Performed by: NURSE PRACTITIONER

## 2024-12-20 PROCEDURE — 1160F RVW MEDS BY RX/DR IN RCRD: CPT | Performed by: NURSE PRACTITIONER

## 2024-12-20 PROCEDURE — 99214 OFFICE O/P EST MOD 30 MIN: CPT | Performed by: NURSE PRACTITIONER

## 2024-12-20 PROCEDURE — 93000 ELECTROCARDIOGRAM COMPLETE: CPT | Performed by: NURSE PRACTITIONER

## 2024-12-20 NOTE — PROGRESS NOTES
Date of Office Visit: 2024  Encounter Provider: SHARLENE Mcmillan  Place of Service: [facility] CARDIOLOGY  Patient Name: Juan C Ca  :1935    Chief complaint: dizziness    HPI: Juan C Ca is a 89 y.o. male who is a patient of  Dr. Garduno and is new to me today.  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  He was seen by Dr. Garduno on  for dizziness with large PVC burden.  He was seen by Dr. Greene with PVCs that were thought to be incidental.  Medications were stopped he underwent a CT of the chest by Dr. Castro a day as he was having some worsening shortness of breath.  He had small bilateral pleural effusions some nodularity in the lingula which may be infectious or inflammatory.    Previous ischemic workup showed a stress Cardiolite PET in January of last year that was unremarkable no ischemia echocardiogram in July of this year showed an EF of 61%.  There was some suspected chronic diastolic heart failure he had some lower extremity edema and we stopped his Lasix but put him on Jardiance as a trial.  We ended up putting him on amiodarone for his PVCs he was taking 200 mg once a day.  Thought about repeating the Holter monitor.  We decreased his metoprolol to a small dose succinate.    He comes in today for follow-up.  He denies chest pain or pressure.  He will have palpitations especially in the morning.  He has no PVCs on his EKG today.  But has PACs.  Previous testing and notes have been reviewed by me.   Past Medical History:   Diagnosis Date    Cancer     skin cancer    CVD (cerebrovascular disease)     with remote infarcts per CT of head.    H/O Iron deficiency anemia     H/O Traumatic brain injury (CMS/HCC)     H/O: pneumonia 2015    Hyperlipidemia     Hypertension     CRISTAL (obstructive sleep apnea)     Premature atrial contraction     Rheumatoid arthritis      Seizures     Started in 1960s.       Past Surgical History:   Procedure Laterality Date    APPENDECTOMY  1950    CATARACT EXTRACTION      COLONOSCOPY N/A 2017    Procedure: COLONOSCOPY TO CECUM AND TI WITH APC CAUTERY OF RIGHT COLON AVM ;  Surgeon: Lucio Stein MD;  Location: [facility] ENDOSCOPY;  Service:     ENDOSCOPY N/A 2017    Procedure: ESOPHAGOGASTRODUODENOSCOPY with BX ;  Surgeon: Lucio Stein MD;  Location: [facility] ENDOSCOPY;  Service:        Social History     Socioeconomic History    Marital status:      Spouse name: Abbie    Years of education: College   Tobacco Use    Smoking status: Former     Current packs/day: 0.00     Types: Cigarettes     Start date: 1972     Quit date: 1987     Years since quittin.0     Passive exposure: Past    Smokeless tobacco: Never    Tobacco comments:     Caffeine - coffee and tea    Vaping Use    Vaping status: Never Used   Substance and Sexual Activity    Alcohol use: No    Drug use: No    Sexual activity: Yes     Partners: Female     Birth control/protection: None       Family History   Problem Relation Age of Onset    Colon cancer Other     Hypertension Mother     Mental illness Mother     Hypertension Father     Heart disease Sister     Hypertension Sister     Colon cancer Sister     Skin cancer Sister     Heart disease Brother     Hypertension Brother     Lung cancer Brother     Diabetes Daughter     Throat cancer Brother     Cancer Brother         Bladder    Prostate cancer Brother        Review of Systems   Constitutional: Negative for diaphoresis and malaise/fatigue.   Cardiovascular:  Negative for chest pain, claudication, dyspnea on exertion, irregular heartbeat, leg swelling, near-syncope, orthopnea, palpitations, paroxysmal nocturnal dyspnea and syncope.   Respiratory:  Negative for cough, shortness of breath and sleep disturbances due to breathing.    Musculoskeletal:  Negative for falls.   Neurological:  Negative for  dizziness and weakness.   Psychiatric/Behavioral:  Negative for altered mental status and substance abuse.        Allergies   Allergen Reactions    Sulfadiazine Unknown - High Severity     fever  Other reaction(s): Fever         Current Outpatient Medications:     acetaminophen (TYLENOL) 500 MG tablet, Take 2 tablets by mouth Every 6 (Six) Hours As Needed for Mild Pain  or Fever., Disp:  , Rfl:     amiodarone (PACERONE) 200 MG tablet, Take 1 tablet by mouth Daily., Disp: 30 tablet, Rfl: 5    aspirin 81 MG EC tablet, Take 1 tablet by mouth Daily. Indications: Buildup of Plaques in Large Arteries Leading to the Brain, Disp: , Rfl:     cetirizine (zyrTEC) 10 MG tablet, Take 0.5 tablets by mouth Daily., Disp: , Rfl:     docusate sodium (COLACE) 100 MG capsule, Take 1 capsule by mouth 2 (Two) Times a Day. PRN  Indications: Constipation, Disp: , Rfl:     empagliflozin (Jardiance) 10 MG tablet tablet, Take 1 tablet by mouth Daily., Disp: 30 tablet, Rfl: 11    famotidine (PEPCID) 20 MG tablet, Take 1 tablet by mouth Daily., Disp: 30 tablet, Rfl: 3    hydroxychloroquine (PLAQUENIL) 200 MG tablet, Take 1 tablet by mouth Daily. Indications: Rheumatoid Arthritis, Disp: , Rfl:     leflunomide (ARAVA) 20 MG tablet, Take 0.5 tablets by mouth Daily. Indications: Rheumatoid Arthritis, Disp: , Rfl:     levETIRAcetam (KEPPRA) 500 MG tablet, Take 1 tablet by mouth 2 (Two) Times a Day. Indications: Seizure, Disp: , Rfl:     levothyroxine (SYNTHROID, LEVOTHROID) 25 MCG tablet, Take 1 tablet by mouth Daily. Indications: Underactive Thyroid, Disp: , Rfl:     memantine (NAMENDA) 10 MG tablet, Take 1 tablet by mouth Daily. 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 Indications: Urinary Incontinence,  "Disp: , Rfl:     Multiple Vitamin (MULTI VITAMIN MENS PO), Take 1 tablet by mouth Daily. Indications: Nutritional Support, Disp: , Rfl:     multivitamins-minerals (PRESERVISION AREDS 2) capsule capsule, , Disp: , Rfl:     tamsulosin (FLOMAX) 0.4 MG capsule 24 hr capsule, Take 1 capsule by mouth every night., Disp: 30 capsule, Rfl: 0    vitamin D3 125 MCG (5000 UT) capsule capsule, Indications: Vitamin D Deficiency, Disp: , Rfl:         Objective:     Vitals:    12/20/24 1044   BP: 118/60   BP Location: Left arm   Patient Position: Sitting   Pulse: 64   SpO2: 93%   Weight: 67.6 kg (149 lb)   Height: 170.2 cm (67.01\")     Body mass index is 23.33 kg/m².    PHYSICAL EXAM:    Constitutional:       General: Not in acute distress.     Appearance: Normal appearance. Well-developed.   Eyes:      Pupils: Pupils are equal, round, and reactive to light.   HENT:      Head: Normocephalic.   Neck:      Vascular: No carotid bruit or JVD.   Pulmonary:      Effort: Pulmonary effort is normal. No tachypnea.      Breath sounds: Normal breath sounds. No wheezing. No rales.   Cardiovascular:      Normal rate. Regular rhythm.      No gallop.    Pulses:     Intact distal pulses.   Edema:     Peripheral edema absent.   Abdominal:      General: Bowel sounds are normal.      Palpations: Abdomen is soft.      Tenderness: There is no abdominal tenderness.   Musculoskeletal: Normal range of motion.      Cervical back: Normal range of motion and neck supple. No edema. Skin:     General: Skin is warm and dry.   Neurological:      Mental Status: Alert and oriented to person, place, and time.           ECG 12 Lead    Date/Time: 12/20/2024 11:06 AM  Performed by: Vibha Mendiola APRN    Authorized by: Vibha Mendiola APRN  Comparison: compared with previous ECG from 10/18/2024  Similar to previous ECG  Rhythm: sinus rhythm  Ectopy: atrial premature contractions  Rate: normal  Conduction: right bundle branch block and left anterior fascicular " block  QRS axis: normal  Other findings: left ventricular hypertrophy    Clinical impression: non-specific ECG            Assessment/Plan:      1.  Palpitations/PVCs-continue metoprolol and amiodarone.  Symptoms GI wise have subsided.  Monitor thyroid function.  QTc stable on EKG today.  Still having palpitations but now has a lot of PACs will repeat Holter monitor to see if there is been a reduction in PVC burden on amiodarone    1.  Chronic diastolic heart failure appears to be tolerating Jardiance well no symptoms of heart failure today continue metoprolol    Follow-up in 3 months with Dr. Bautista.         Your medication list            Accurate as of December 20, 2024 11:01 AM. If you have any questions, ask your nurse or doctor.                CONTINUE taking these medications        Instructions Last Dose Given Next Dose Due   acetaminophen 500 MG tablet  Commonly known as: TYLENOL      Take 2 tablets by mouth Every 6 (Six) Hours As Needed for Mild Pain  or Fever.       amiodarone 200 MG tablet  Commonly known as: PACERONE      Take 1 tablet by mouth Daily.       aspirin 81 MG EC tablet      Take 1 tablet by mouth Daily. Indications: Buildup of Plaques in Large Arteries Leading to the Brain       cetirizine 10 MG tablet  Commonly known as: zyrTEC      Take 0.5 tablets by mouth Daily.       docusate sodium 100 MG capsule  Commonly known as: COLACE      Take 1 capsule by mouth 2 (Two) Times a Day. PRN  Indications: Constipation       empagliflozin 10 MG tablet tablet  Commonly known as: Jardiance      Take 1 tablet by mouth Daily.       Famotidine Maximum Strength 20 MG tablet  Generic drug: famotidine      Take 1 tablet by mouth Daily.       hydroxychloroquine 200 MG tablet  Commonly known as: PLAQUENIL      Take 1 tablet by mouth Daily. Indications: Rheumatoid Arthritis       leflunomide 20 MG tablet  Commonly known as: ARAVA      Take 0.5 tablets by mouth Daily. Indications: Rheumatoid Arthritis        levETIRAcetam 500 MG tablet  Commonly known as: KEPPRA      Take 1 tablet by mouth 2 (Two) Times a Day. Indications: Seizure       levothyroxine 25 MCG tablet  Commonly known as: SYNTHROID, LEVOTHROID      Take 1 tablet by mouth Daily. Indications: Underactive Thyroid       memantine 10 MG tablet  Commonly known as: NAMENDA      Take 1 tablet by mouth Daily. 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       Mirabegron ER 50 MG tablet sustained-release 24 hour 24 hr tablet  Commonly known as: MYRBETRIQ      Take 50 mg by mouth. Indications: Urinary Incontinence       multivitamin tablet tablet  Commonly known as: THERAGRAN      Take 1 tablet by mouth Daily. Indications: Nutritional Support       multivitamins-minerals capsule capsule           tamsulosin 0.4 MG capsule 24 hr capsule  Commonly known as: FLOMAX      Take 1 capsule by mouth every night.       vitamin D3 125 MCG (5000 UT) capsule capsule      Indications: Vitamin D Deficiency                  As always, it has been a pleasure to participate in your patient's care.      Sincerely,     Vibha SU

## 2024-12-31 ENCOUNTER — TELEPHONE (OUTPATIENT)
Dept: CARDIOLOGY | Facility: CLINIC | Age: 89
End: 2024-12-31
Payer: MEDICARE

## 2024-12-31 RX ORDER — METOPROLOL SUCCINATE 25 MG/1
25 TABLET, EXTENDED RELEASE ORAL 2 TIMES DAILY
Qty: 145 TABLET | Refills: 3 | Status: SHIPPED | OUTPATIENT
Start: 2024-12-31

## 2024-12-31 NOTE — TELEPHONE ENCOUNTER
Talk to patient's daughter about Holter monitor results.  Previously had a 29% PVC burden.  New monitor shows a 12% burden with 8% APC burden which is new.  He does feel a little bit of heart racing in the morning we will increase his metoprolol to succinate to 25 mg twice a day.  Continue to monitor blood pressure and heart rate at home.

## 2025-01-13 ENCOUNTER — OFFICE VISIT (OUTPATIENT)
Dept: SLEEP MEDICINE | Facility: HOSPITAL | Age: 89
End: 2025-01-13
Payer: MEDICARE

## 2025-01-13 VITALS
OXYGEN SATURATION: 95 % | HEIGHT: 67 IN | DIASTOLIC BLOOD PRESSURE: 60 MMHG | HEART RATE: 52 BPM | WEIGHT: 141 LBS | SYSTOLIC BLOOD PRESSURE: 161 MMHG | BODY MASS INDEX: 22.13 KG/M2

## 2025-01-13 DIAGNOSIS — G47.33 OSA ON CPAP: Primary | ICD-10-CM

## 2025-01-13 PROCEDURE — G0463 HOSPITAL OUTPT CLINIC VISIT: HCPCS

## 2025-01-13 NOTE — PROGRESS NOTES
"[facility] PULMONARY CARE         Dr Jana Jones  [unfilled]  Patient Care Team:  Tej Cedillo Jr., MD as PCP - General  Tej Cedillo Jr., MD as PCP - Family Medicine  Tej Cedillo Jr., MD as Referring Physician (Internal Medicine)  Sharon Beal MD as Consulting Physician (Hematology and Oncology)    Chief Complaint:Overall apnea index of 18.9 events per hour  RDI 24 events per hour  Apnea index left side 16.4 events per hour  Apnea index on the right side 19.3 events per hour  Apnea index during REM sleep 13 events per hour  Recommend treatment pressures of 8 cm with heated humidity and ramp    Interval History: Patient here for annual compliance visit.  Currently set up on auto CPAP 8 to 20 cm.  Compliance today 59% with average daily use 6 hours 16 minutes.  AHI and leak within normal limits.  He tells me that he had a bad cold in December as a result of which he was unable to use the CPAP for 2 weeks.  Generally feels more rested with the CPAP.  Goes to bed 9 PM gets up 730 gets up at 8+ hours of sleep more rested with CPAP.  No tobacco no alcohol no caffeine abuse.  Currently has a fullface mask that fits well.  Supplies been adequate.  [place] 0 out of 24 within normal limits.    REVIEW OF SYSTEMS:   CARDIOVASCULAR: No chest pain, chest pressure or chest discomfort. No palpitations or edema.   RESPIRATORY: Positive for shortness of breath and cough  GASTROINTESTINAL: No anorexia, nausea, vomiting or diarrhea. No abdominal pain or blood.   HEMATOLOGIC: No bleeding or bruising.  Positive for postnasal drainage shortness of breath and cough    Ventilator/Non-Invasive Ventilation Settings (From admission, onward)      None              Vital Signs  Heart Rate:  [52] 52  BP: (161)/(60) 161/60  [unfilled]  Flowsheet Rows      Flowsheet Row First Filed Value   Admission Height 170.2 cm (67.01\") Documented at 01/13/2025 1312   Admission Weight 64 kg (141 lb) Documented at 01/13/2025 1312 "          BMI is within normal parameters. No other follow-up for BMI required.       Physical Exam:  Patient is examined using the personal protective equipment as per guidelines from infection control for this particular patient as enacted.  Hand hygiene was performed before and after patient interaction.   General Appearance:    Alert, cooperative, in no acute distress.  Following simple commands  ENT Mallampati between 3 and 4 no nasal congestion  Neck midline trachea, no thyromegaly   Lungs:     Clear to auscultation, respirations regular, even and                  unlabored    Heart:    Regular rhythm and normal rate, normal S1 and S2, no            murmur, no gallop, no rub, no click   Chest Wall:    No abnormalities observed   Abdomen:     Normal bowel sounds, no masses, no organomegaly, soft        nontender, nondistended, no guarding, no rebound                tenderness   Extremities:   Moves all extremities well, no edema, no cyanosis, no             redness  CNS no focal neurological deficits normal sensory exam  Skin no rashes no nodules  Musculoskeletal no cyanosis no clubbing normal range of motion     Results Review:                                          I reviewed the patient's new clinical results.  I personally viewed and interpreted the patient's chest x-ray.        Medication Review:       No current facility-administered medications for this visit.      ASSESSMENT:   CRISTAL on CPAP  History of Covid infection  Allergic rhinitis  Rheumatoid arthritis  Seizure disorder  History of tobacco abuse    PLAN:  Reviewed compliance download with the patient  Compliance somewhat suboptimal and needs to improve  Discussed ways to improve compliance  AHI leak within normal limits  Continue current auto CPAP 8 to 20 cm  Sleep hygiene measures  Treatment of allergies with nasal steroid antihistamine  Follow-up in 1 year      Jana Jones MD  01/13/25  13:18 EST

## 2025-02-18 ENCOUNTER — LAB (OUTPATIENT)
Dept: LAB | Facility: HOSPITAL | Age: 89
End: 2025-02-18
Payer: MEDICARE

## 2025-02-18 ENCOUNTER — OFFICE VISIT (OUTPATIENT)
Dept: ONCOLOGY | Facility: CLINIC | Age: 89
End: 2025-02-18
Payer: MEDICARE

## 2025-02-18 VITALS
HEART RATE: 53 BPM | SYSTOLIC BLOOD PRESSURE: 161 MMHG | OXYGEN SATURATION: 95 % | DIASTOLIC BLOOD PRESSURE: 66 MMHG | WEIGHT: 155.2 LBS | HEIGHT: 67 IN | BODY MASS INDEX: 24.36 KG/M2 | TEMPERATURE: 97.5 F

## 2025-02-18 DIAGNOSIS — D50.9 IRON DEFICIENCY ANEMIA, UNSPECIFIED IRON DEFICIENCY ANEMIA TYPE: ICD-10-CM

## 2025-02-18 DIAGNOSIS — D63.8 ANEMIA OF CHRONIC DISEASE: Primary | Chronic | ICD-10-CM

## 2025-02-18 LAB
BASOPHILS # BLD AUTO: 0.05 10*3/MM3 (ref 0–0.2)
BASOPHILS NFR BLD AUTO: 0.7 % (ref 0–1.5)
DEPRECATED RDW RBC AUTO: 52.3 FL (ref 37–54)
EOSINOPHIL # BLD AUTO: 0.14 10*3/MM3 (ref 0–0.4)
EOSINOPHIL NFR BLD AUTO: 2 % (ref 0.3–6.2)
ERYTHROCYTE [DISTWIDTH] IN BLOOD BY AUTOMATED COUNT: 14.5 % (ref 12.3–15.4)
HCT VFR BLD AUTO: 36.3 % (ref 37.5–51)
HGB BLD-MCNC: 12.1 G/DL (ref 13–17.7)
IMM GRANULOCYTES # BLD AUTO: 0.04 10*3/MM3 (ref 0–0.05)
IMM GRANULOCYTES NFR BLD AUTO: 0.6 % (ref 0–0.5)
LYMPHOCYTES # BLD AUTO: 1.42 10*3/MM3 (ref 0.7–3.1)
LYMPHOCYTES NFR BLD AUTO: 20.8 % (ref 19.6–45.3)
MCH RBC QN AUTO: 33 PG (ref 26.6–33)
MCHC RBC AUTO-ENTMCNC: 33.3 G/DL (ref 31.5–35.7)
MCV RBC AUTO: 98.9 FL (ref 79–97)
MONOCYTES # BLD AUTO: 0.74 10*3/MM3 (ref 0.1–0.9)
MONOCYTES NFR BLD AUTO: 10.8 % (ref 5–12)
NEUTROPHILS NFR BLD AUTO: 4.44 10*3/MM3 (ref 1.7–7)
NEUTROPHILS NFR BLD AUTO: 65.1 % (ref 42.7–76)
NRBC BLD AUTO-RTO: 0 /100 WBC (ref 0–0.2)
PLATELET # BLD AUTO: 148 10*3/MM3 (ref 140–450)
PMV BLD AUTO: 10.8 FL (ref 6–12)
RBC # BLD AUTO: 3.67 10*6/MM3 (ref 4.14–5.8)
WBC NRBC COR # BLD AUTO: 6.83 10*3/MM3 (ref 3.4–10.8)

## 2025-02-18 PROCEDURE — 36415 COLL VENOUS BLD VENIPUNCTURE: CPT

## 2025-02-18 PROCEDURE — 85025 COMPLETE CBC W/AUTO DIFF WBC: CPT

## 2025-02-18 RX ORDER — LANOLIN ALCOHOL/MO/W.PET/CERES
500 CREAM (GRAM) TOPICAL DAILY
COMMUNITY

## 2025-02-18 NOTE — PROGRESS NOTES
Follow-up    REASON FOR CONSULTATION: Anemia                               REQUESTING PHYSICIAN: No ref. provider found  RECORDS OBTAINED:  Records of the patients history including those from the electronic medical record were reviewed and summarized in detail.    HISTORY OF PRESENT ILLNESS:  The patient is a 89 y.o. year old male with hypertension, hyperlipidemia, obstructive sleep apnea, premature ventricular contractions, referred to our clinic for evaluation management of anemia.  Upon review of labs, his hemoglobin from October 7, 2024 was 10.3, iron saturation 16%, ferritin 1270.  He was previously seen by Dr. Novoa from our group in 2020.  He was given IV Injectafer x 2.  Today, he is here accompanied by his son for evaluation of anemia.  Son reports patient had episodes of dizziness, which prompted anemia evaluation.  No active bleeding.    Interval History, 2/18/2025:  He presents today for 6 month follow up on anemia related to chronic disease. He is doing well without new concerns today. He does report a little swelling in his ankles and notes that his weight fluctuates depending on his intake-his weight is up today as he had salty lunch- hamburger and garlic bread.      Past Medical History:   Diagnosis Date    Cancer     skin cancer    CVD (cerebrovascular disease)     with remote infarcts per CT of head.    H/O Iron deficiency anemia     H/O Traumatic brain injury (CMS/HCC)     H/O: pneumonia 04/2015    Hyperlipidemia     Hypertension     CRISTAL (obstructive sleep apnea)     Premature atrial contraction     Rheumatoid arthritis     Seizures     Started in 1960s.     Past Surgical History:   Procedure Laterality Date    APPENDECTOMY  1950    CATARACT EXTRACTION      COLONOSCOPY N/A 12/12/2017    Procedure: COLONOSCOPY TO CECUM AND TI WITH APC CAUTERY OF RIGHT COLON AVM ;  Surgeon: Lucio Stein MD;  Location: [facility] ENDOSCOPY;  Service:     ENDOSCOPY N/A 12/12/2017    Procedure:  ESOPHAGOGASTRODUODENOSCOPY with BX ;  Surgeon: Lucio Stein MD;  Location: [facility] ENDOSCOPY;  Service:        MEDICATIONS    Current Outpatient Medications:     acetaminophen (TYLENOL) 500 MG tablet, Take 2 tablets by mouth Every 6 (Six) Hours As Needed for Mild Pain  or Fever., Disp:  , Rfl:     amiodarone (PACERONE) 200 MG tablet, Take 1 tablet by mouth Daily., Disp: 30 tablet, Rfl: 5    aspirin 81 MG EC tablet, Take 1 tablet by mouth Daily. Indications: Buildup of Plaques in Large Arteries Leading to the Brain, Disp: , Rfl:     cetirizine (zyrTEC) 10 MG tablet, Take 0.5 tablets by mouth Daily., Disp: , Rfl:     docusate sodium (COLACE) 100 MG capsule, Take 1 capsule by mouth 2 (Two) Times a Day. PRN  Indications: Constipation, Disp: , Rfl:     empagliflozin (Jardiance) 10 MG tablet tablet, Take 1 tablet by mouth Daily., Disp: 30 tablet, Rfl: 11    famotidine (PEPCID) 20 MG tablet, Take 1 tablet by mouth Daily., Disp: 30 tablet, Rfl: 3    hydroxychloroquine (PLAQUENIL) 200 MG tablet, Take 1 tablet by mouth Daily. Indications: Rheumatoid Arthritis, Disp: , Rfl:     leflunomide (ARAVA) 20 MG tablet, Take 0.5 tablets by mouth Daily. Indications: Rheumatoid Arthritis, Disp: , Rfl:     levETIRAcetam (KEPPRA) 500 MG tablet, Take 1 tablet by mouth 2 (Two) Times a Day. Indications: Seizure, Disp: , Rfl:     levothyroxine (SYNTHROID, LEVOTHROID) 25 MCG tablet, Take 1 tablet by mouth Daily. Indications: Underactive Thyroid, Disp: , Rfl:     memantine (NAMENDA) 10 MG tablet, Take 1 tablet by mouth Daily. 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 Indications: Urinary Incontinence, Disp: , Rfl:     Multiple Vitamin (MULTI VITAMIN MENS PO), Take 1 tablet by mouth  "Daily. Indications: Nutritional Support, Disp: , Rfl:     multivitamins-minerals (PRESERVISION AREDS 2) capsule capsule, , Disp: , Rfl:     tamsulosin (FLOMAX) 0.4 MG capsule 24 hr capsule, Take 1 capsule by mouth every night., Disp: 30 capsule, Rfl: 0    vitamin D3 125 MCG (5000 UT) capsule capsule, Indications: Vitamin D Deficiency, Disp: , Rfl:     ALLERGIES:     Allergies   Allergen Reactions    Sulfadiazine Unknown - High Severity     fever  Other reaction(s): Fever       SOCIAL HISTORY:       Social History     Socioeconomic History    Marital status:      Spouse name: Abbie    Years of education: College   Tobacco Use    Smoking status: Former     Current packs/day: 0.00     Types: Cigarettes     Start date: 1972     Quit date: 1987     Years since quittin.2     Passive exposure: Past    Smokeless tobacco: Never    Tobacco comments:     Caffeine - coffee and tea    Vaping Use    Vaping status: Never Used   Substance and Sexual Activity    Alcohol use: No    Drug use: No    Sexual activity: Yes     Partners: Female     Birth control/protection: None         FAMILY HISTORY:  Family History   Problem Relation Age of Onset    Colon cancer Other     Hypertension Mother     Mental illness Mother     Hypertension Father     Heart disease Sister     Hypertension Sister     Colon cancer Sister     Skin cancer Sister     Heart disease Brother     Hypertension Brother     Lung cancer Brother     Diabetes Daughter     Throat cancer Brother     Cancer Brother         Bladder    Prostate cancer Brother             Vitals:    25 1351   BP: 161/66   Pulse: 53   Temp: 97.5 °F (36.4 °C)   TempSrc: Oral   SpO2: 95%   Weight: 70.4 kg (155 lb 3.2 oz)   Height: 170.2 cm (67.01\")   PainSc: 0-No pain           10/24/2024     2:50 PM   Current Status   ECOG score 1        PHYSICAL EXAM:   CONSTITUTIONAL:  Vital signs reviewed.  No distress, looks comfortable.  RESPIRATORY:  Normal respiratory effort. "   CARDIOVASCULAR:  Normal S1, S2.  1+ trace lower extremity edema.   GASTROINTESTINAL: Abdomen appears unremarkable.      RECENT LABS:  Results from last 7 days   Lab Units 02/18/25  1339   WBC 10*3/mm3 6.83   NEUTROS ABS 10*3/mm3 4.44   HEMOGLOBIN g/dL 12.1*   HEMATOCRIT % 36.3*   PLATELETS 10*3/mm3 148               Assessment:  *Macrocytic anemia/anemia of chronic disease   Upon review of labs, his hemoglobin from October 7, 2024 was 10.3, iron saturation 16%, ferritin 1270.    He was previously seen by Dr. Novoa from our group in 2020.  He was given IV Injectafer x 2.  GI workup for iron deficiency was unremarkable.  10/24/2024: Hemoglobin improved to 11.5.  Reticulocyte hemoglobin 34%.  Discussed with the patient he is anemia is likely secondary to anemia of chronic disease.  However, will do a complete lab eval of anemia.  2/18/2025: Hemoglobin today 12.1. He is doing well and hemoglobin has improved, therefore, we will have him follow up on an as needed basis. He gets labs frequently through other specialties so I encouraged him and his daughter to reach out in the future with any concerns.     *Rheumatoid arthritis  On leflunomide, hydroxychloroquine    Plan:   Return to clinic as needed.     SHARLENE Everett

## 2025-03-17 ENCOUNTER — TRANSCRIBE ORDERS (OUTPATIENT)
Age: 89
End: 2025-03-17
Payer: MEDICARE

## 2025-03-17 DIAGNOSIS — I73.9 PAD (PERIPHERAL ARTERY DISEASE): Primary | ICD-10-CM

## 2025-03-17 NOTE — PROGRESS NOTES
"Chief Complaint  Abnormal JOSE D's    Subjective        Juan C Ca presents to [facility]  History of Present Illness    Patient recently transitioning to care facility.  This was after a fall in February with late left great toe injury.  Since the fall he seen his podiatrist ABIs have been ordered but his toe injury has been able to heal.    Objective   Vital Signs:  /79   Pulse (!) 48   Ht 170.2 cm (67.01\")   Wt 70.3 kg (155 lb)   BMI 24.27 kg/m²   Estimated body mass index is 24.27 kg/m² as calculated from the following:    Height as of this encounter: 170.2 cm (67.01\").    Weight as of this encounter: 70.3 kg (155 lb).     BMI is within normal parameters. No other follow-up for BMI required.    Juan C Ca  reports that he quit smoking about 37 years ago. His smoking use included cigarettes. He started smoking about 52 years ago. He has been exposed to tobacco smoke. He has never used smokeless tobacco.      Physical Exam  Constitutional:       Appearance: He is well-developed.   Pulmonary:      Effort: Pulmonary effort is normal. No respiratory distress.   Abdominal:      General: There is no distension.      Palpations: Abdomen is soft.   Neurological:      Mental Status: He is alert and oriented to person, place, and time.     1+ pulses bilateral feet.    Result Review :    The following data was reviewed by: Julian Geiger MD on 03/18/25  CBC          10/7/2024    12:55 10/24/2024    14:11 2/18/2025    13:39   CBC   WBC 7.15  6.81  6.83    RBC 3.33  3.58  3.67    Hemoglobin 10.3  11.5  12.1    Hematocrit 33.8  34.8  36.3    .5  97.2  98.9    MCH 30.9  32.1  33.0    MCHC 30.5  33.0  33.3    RDW 13.2  14.3  14.5    Platelets 290  200  148       BMP          6/27/2024    13:58 7/15/2024    12:44 10/7/2024    12:55   BMP   BUN 14  24  19    Creatinine 1.08  1.18  1.19    Sodium 140  139  139    Potassium 4.7  4.6  4.6    Chloride 109  103  106    CO2 " 25.0  25.0  24.0    Calcium 9.0  9.6  9.3           Data reviewed : Cardiology studies as below  Vascular Surgical History:    Vascular Imaging History:        (Text in bold font has been individually reviewed by myself and confirmed)         Assessment and Plan     Vascular surgery following for:  Peripheral arterial disease.    Diagnoses and all orders for this visit:    1. Peripheral vascular disease, unspecified (Primary)  -     Doppler Ankle Brachial Index Single Level CAR; Future    New patient evaluation elderly gentleman 89 years old for mild peripheral arterial disease seen on ABIs at [facility].  Sees podiatry for left great toe injury.  Has been able to heal this injury occurred after a fall.  He is on relatively good medical therapy with aspirin.  He has good vascular control of his risk factors.  I would like to see him back in 3 months time to ensure his feet are doing well and check a send ABIs here on campus to ensure they are adequate.      Vascular medical management: Patient should continue aspirin 81 mg daily.  Given his advanced age and relatively mild peripheral arterial disease patient reports that he has normal cholesterol in the past.  I feel no compelling indication for statin therapy unless he has dyslipidemia.  Return in about 3 months (around 6/18/2025), or if symptoms worsen or fail to improve, for Follow up with vascular ultrasound.  Patient was given instructions and counseling regarding his condition or for health maintenance advice. Please see specific information pulled into the AVS if appropriate.

## 2025-03-18 ENCOUNTER — OFFICE VISIT (OUTPATIENT)
Age: 89
End: 2025-03-18
Payer: MEDICARE

## 2025-03-18 VITALS
BODY MASS INDEX: 24.33 KG/M2 | HEIGHT: 67 IN | WEIGHT: 155 LBS | HEART RATE: 48 BPM | DIASTOLIC BLOOD PRESSURE: 79 MMHG | SYSTOLIC BLOOD PRESSURE: 166 MMHG

## 2025-03-18 DIAGNOSIS — I73.9 PERIPHERAL VASCULAR DISEASE, UNSPECIFIED: Primary | ICD-10-CM

## 2025-03-18 RX ORDER — VIBEGRON 75 MG/1
TABLET, FILM COATED ORAL
COMMUNITY

## 2025-03-24 ENCOUNTER — APPOINTMENT (OUTPATIENT)
Dept: GENERAL RADIOLOGY | Facility: HOSPITAL | Age: 89
End: 2025-03-24
Payer: MEDICARE

## 2025-03-24 ENCOUNTER — HOSPITAL ENCOUNTER (OUTPATIENT)
Facility: HOSPITAL | Age: 89
Discharge: HOME | End: 2025-03-24
Attending: STUDENT IN AN ORGANIZED HEALTH CARE EDUCATION/TRAINING PROGRAM | Admitting: STUDENT IN AN ORGANIZED HEALTH CARE EDUCATION/TRAINING PROGRAM
Payer: MEDICARE

## 2025-03-24 VITALS
DIASTOLIC BLOOD PRESSURE: 70 MMHG | TEMPERATURE: 97.3 F | HEART RATE: 53 BPM | BODY MASS INDEX: 21.98 KG/M2 | HEIGHT: 68 IN | SYSTOLIC BLOOD PRESSURE: 172 MMHG | RESPIRATION RATE: 18 BRPM | OXYGEN SATURATION: 93 % | WEIGHT: 145 LBS

## 2025-03-24 DIAGNOSIS — M25.522 LEFT ELBOW PAIN: Primary | ICD-10-CM

## 2025-03-24 PROCEDURE — G0463 HOSPITAL OUTPT CLINIC VISIT: HCPCS | Performed by: STUDENT IN AN ORGANIZED HEALTH CARE EDUCATION/TRAINING PROGRAM

## 2025-03-24 PROCEDURE — 73080 X-RAY EXAM OF ELBOW: CPT

## 2025-03-24 NOTE — Clinical Note
[facility]  [address]  [place] [address]    Juan C Ca was seen and treated in our emergency department on 3/24/2025.  He may return to school on 03/26/2025.  Please excuse from last week as well as she started with illness on 3/18/25        Thank you for choosing [facility].    Biju Garcia, DO

## 2025-03-25 NOTE — DISCHARGE INSTRUCTIONS
Please follow up with your primary care physician in the next 1-3 days. If this is not possible, please return to the ED for re-evaluation.    Please see your doctor in the next 7 days for rule out of occult fracture.     It is IMPORTANT to see your DOCTOR OR PRIMARY CARE PROVIDER. Emergency Care may be incomplete without proper follow-up.  Your evaluation in the emergency department is focused on a specific clinical complaint, at a given moment in time.  Symptoms sometimes change or new symptoms might arise after you leave the Emergency Department. It is important that you call your doctor if you become worse in any way, or promptly return to the Emergency Department should any new, or worsening/changing symptom occur.  You are strongly urged to follow-up with your physician to assure complete and thorough care. PLEASE CALL YOUR DOCTORS OFFICE TODAY, INFORM THEM THAT YOU WERE SEEN IN THE EMERGENCY DEPARTMENT, AND THAT YOU NEED TO BE SEEN IMMEDIATELY FOR CLOSE FOLLOW UP.  If you do not have a primary care doctor we encourage you to proactively seek a local physician for close follow up.  Consider local clinics, free or sliding scale clinics, local [facility].  You can always return to the Emergency Department if you are having difficulty coordinating close follow up.  If medications were prescribed you should fill them at your local pharmacy immediately and take only as prescribed.  Bring your new medications to your doctors follow up visit to discuss any changes that would be necessary. RETURN TO THE EMERGENCY DEPARTMENT IMMEDIATELY FOR ANY NEW OR WORSENING SYMPTOMS.    ADDITIONAL DISCHARGE INSTRUCTIONS:     - If you received IV contrast today with a CT or MRI please stay well hydrated for the next 48-72 hours to protect your kidneys.    - If you have been prescribed an antibiotic, taking an over the counter probiotic may help with gastrointestinal side effects and antibiotic associated diarrhea.    - Return  to the emergency department or seek immediate medical care if any of the following occur:           - Symptoms do not improve in 8-12 hours. If this occurs, please return to the emergency department for re-evaluation or your symptoms or repeat abdominal examination         - Symptoms worsen at any time.         - If you are unable to follow up with a medical provider as instructed.         - You develop any worrisome symptoms such as fever, chills, uncontrolled pain, change or worsening of your pain symptoms, intractable vomiting, uncontrolled diarrhea, blood in your stool, dark/tarry stool, new neurological symptoms etc.    If you have been prescribed a narcotic pain medication, please take a stool softener to prevent constipation.     During your ED visit, you may have been given narcotics (such as morphine, dilaudid, percocet, vicodin) or sedatives (such as ativan, versed). These medications can impair your reflexes so, DO NOT DRIVE or USE ANY MACHINERY for at least 6 hours if you have been given these medications.    REMINDER FOR METFORMIN USERS: If you are using metformin (also known as Glucophage) and if you received a CT scan in which you received IV contrast, you must hold your metformin for a total of 72 hrs.  This will prevent any adverse interactions between the two agents.

## 2025-03-25 NOTE — FSED PROVIDER NOTE
Subjective   History of Present Illness  This is a pleasant 89-year-old man who presents with left elbow pain after he backed into a wall.  He complains of pain to the posterior aspect of left able, no numbness tingling or loss of sensation.  He denies decrease in range of motion, no reproducible pain with palpation.      Review of Systems   Constitutional:  Negative for activity change, chills, diaphoresis and fever.   HENT: Negative.     Respiratory: Negative.     Cardiovascular: Negative.    Skin: Negative.    Neurological: Negative.    All other systems reviewed and are negative.      Past Medical History:   Diagnosis Date    Cancer     skin cancer    CVD (cerebrovascular disease)     with remote infarcts per CT of head.    H/O Iron deficiency anemia     H/O Traumatic brain injury (CMS/HCC)     H/O: pneumonia 04/2015    Hyperlipidemia     Hypertension     CRISTAL (obstructive sleep apnea)     Premature atrial contraction     Rheumatoid arthritis     Seizures     Started in 1960s.       Allergies   Allergen Reactions    Sulfadiazine Unknown - High Severity     fever  Other reaction(s): Fever       Past Surgical History:   Procedure Laterality Date    APPENDECTOMY  1950    CATARACT EXTRACTION      COLONOSCOPY N/A 12/12/2017    Procedure: COLONOSCOPY TO CECUM AND TI WITH APC CAUTERY OF RIGHT COLON AVM ;  Surgeon: Lucio Stein MD;  Location: [facility] ENDOSCOPY;  Service:     ENDOSCOPY N/A 12/12/2017    Procedure: ESOPHAGOGASTRODUODENOSCOPY with BX ;  Surgeon: Lucio Stein MD;  Location: [facility] ENDOSCOPY;  Service:        Family History   Problem Relation Age of Onset    Colon cancer Other     Hypertension Mother     Mental illness Mother     Hypertension Father     Heart disease Sister     Hypertension Sister     Colon cancer Sister     Skin cancer Sister     Heart disease Brother     Hypertension Brother     Lung cancer Brother     Diabetes Daughter     Throat cancer Brother     Cancer Brother         Bladder     Prostate cancer Brother        Social History     Socioeconomic History    Marital status:      Spouse name: Abbie    Years of education: College   Tobacco Use    Smoking status: Former     Current packs/day: 0.00     Types: Cigarettes     Start date: 1972     Quit date: 1987     Years since quittin.3     Passive exposure: Past    Smokeless tobacco: Never    Tobacco comments:     Caffeine - coffee and tea    Vaping Use    Vaping status: Never Used   Substance and Sexual Activity    Alcohol use: No    Drug use: No    Sexual activity: Yes     Partners: Female     Birth control/protection: None           Objective   Physical Exam  Vitals and nursing note reviewed.   Constitutional:       Appearance: Normal appearance. He is normal weight. He is not ill-appearing, toxic-appearing or diaphoretic.   HENT:      Head: Normocephalic and atraumatic.      Right Ear: External ear normal.      Left Ear: External ear normal.      Nose: Nose normal. No congestion or rhinorrhea.      Mouth/Throat:      Pharynx: No oropharyngeal exudate or posterior oropharyngeal erythema.   Eyes:      Extraocular Movements: Extraocular movements intact.      Conjunctiva/sclera: Conjunctivae normal.      Pupils: Pupils are equal, round, and reactive to light.   Cardiovascular:      Rate and Rhythm: Normal rate and regular rhythm.      Pulses: Normal pulses.   Pulmonary:      Effort: Pulmonary effort is normal.      Breath sounds: Normal breath sounds. No stridor. No wheezing, rhonchi or rales.   Chest:      Chest wall: No tenderness.   Abdominal:      General: There is no distension.      Palpations: Abdomen is soft. There is no mass.   Musculoskeletal:         General: No swelling, tenderness or signs of injury. Normal range of motion.      Cervical back: Normal range of motion. No rigidity or tenderness.      Comments: On evaluation of the patient's left elbow there is no evidence of acute bony abnormality.  No tenderness to  palpation, he has no difficulty with pronation, supination, flexion or extension.  No abnormality to the humerus, shoulder joint, clavicle or forearm.  Pulses intact 2+ to the upper extremities bilaterally, sensation intact to light touch throughout.   Skin:     General: Skin is warm.      Capillary Refill: Capillary refill takes less than 2 seconds.      Coloration: Skin is not jaundiced or pale.      Findings: No bruising.   Neurological:      General: No focal deficit present.      Mental Status: He is alert and oriented to person, place, and time. Mental status is at baseline.      Motor: No weakness.         Procedures           ED Course                                           Medical Decision Making  Splinting Procedure Note  Time:  20;05      Confirmed correct: Patient, procedure, side, site  Consent: Patient / legal guardian    Indication: Possible left elbow fracture  Location: Left upper extremity    Pre procedure exam: Circulation motor and sensory intact  Post procedure exam: Circulation motor and sensory intact    Immobilization: Sugar-tong with sling    Post splint application distal neurovascular exam normal    Patient tolerated: Well  Performed by: Biju Garcia DO  Total time: 10 minutes    ========================  MDM:    Escalation of care including admission/observation considered    - Discussions of management with other providers:  None    - Discussed/reviewed with Radiology regarding test interpretation    - Independent interpretation: XR    - Additional patient history obtained from: [facility]    - Review of external non-ED record (if available):  Prior Inpt record, Office record, Outpt record, Prior Outpt labs, PCP record, Outside ED record, Other    - Chronic conditions affecting care: See HPI and medical Hx.    - Social Determinants of health significantly affecting care:  None        Medical Decision Making Discussion:    89-year-old presents to the emergency department complaints of  left elbow pain.  X-ray does not show obvious evidence of acute abnormality but he does have osteoarthritis.  There is a very slight anterior sail sign and as a result the patient is placed in sugar-tong and sling and instructed to follow-up on an outpatient basis to rule out occult fracture in the neck 7 days.  Patient and his family are agreeable.  Stable for discharge.    The patient has been given very strict return precautions to return to the emergency department should there be any acute change or worsening of their condition.  I have explained my findings and the patient has expressed understanding to me.  I explained that the work-up performed in the ED has been based on the specific complaint and concern, as the nature of emergency medicine is complaint driven and they understand that new symptoms may arise.  I have told them that, should there be any new symptoms, worsening or changing symptoms, a new work-up may be indicated that they are encouraged to return to the emergency department or promptly contact their primary care physician. We have employed a shared decision-making process as the discussion of their disposition.  The patient has been educated as to the nature of the visit, the tests and work-up performed and the findings from today's visit. At this time, there does not appear to be any acute emergent process that necessitates admission to the hospital, however, the patient understands that this can change unexpectedly. At this time, the patient is stable for discharge home and agrees to follow-up with her primary care physician in the next 24 to 48 hours or earlier should they be able to obtain an appointment.    The patient was counseled regarding diagnostic results and treatment plan and patient has indicated understanding of these instructions.      Problems Addressed:  Left elbow pain: acute illness or injury        Final diagnoses:   Left elbow pain       ED Disposition  ED Disposition        ED Disposition   Discharge    Condition   Good    Comment   --               Tej Cedillo Jr., MD  [address] [address]  [phone]               Medication List      No changes were made to your prescriptions during this visit.

## 2025-03-26 ENCOUNTER — TRANSCRIBE ORDERS (OUTPATIENT)
Dept: LAB | Facility: HOSPITAL | Age: 89
End: 2025-03-26
Payer: MEDICARE

## 2025-03-31 ENCOUNTER — HOSPITAL ENCOUNTER (OUTPATIENT)
Dept: GENERAL RADIOLOGY | Facility: HOSPITAL | Age: 89
Discharge: HOME | End: 2025-03-31
Payer: MEDICARE

## 2025-03-31 ENCOUNTER — OFFICE VISIT (OUTPATIENT)
Dept: CARDIOLOGY | Age: 89
End: 2025-03-31
Payer: MEDICARE

## 2025-03-31 VITALS
SYSTOLIC BLOOD PRESSURE: 160 MMHG | BODY MASS INDEX: 23.19 KG/M2 | HEIGHT: 68 IN | WEIGHT: 153 LBS | OXYGEN SATURATION: 95 % | HEART RATE: 50 BPM | DIASTOLIC BLOOD PRESSURE: 82 MMHG

## 2025-03-31 DIAGNOSIS — R42 DIZZINESS: ICD-10-CM

## 2025-03-31 DIAGNOSIS — I50.32 CHRONIC DIASTOLIC (CONGESTIVE) HEART FAILURE: ICD-10-CM

## 2025-03-31 DIAGNOSIS — M25.522 LEFT ELBOW PAIN: ICD-10-CM

## 2025-03-31 DIAGNOSIS — M54.9 MODERATE BACK PAIN: ICD-10-CM

## 2025-03-31 DIAGNOSIS — I49.3 PVC (PREMATURE VENTRICULAR CONTRACTION): Primary | ICD-10-CM

## 2025-03-31 DIAGNOSIS — I10 PRIMARY HYPERTENSION: ICD-10-CM

## 2025-03-31 PROCEDURE — 1160F RVW MEDS BY RX/DR IN RCRD: CPT | Performed by: STUDENT IN AN ORGANIZED HEALTH CARE EDUCATION/TRAINING PROGRAM

## 2025-03-31 PROCEDURE — 93000 ELECTROCARDIOGRAM COMPLETE: CPT | Performed by: STUDENT IN AN ORGANIZED HEALTH CARE EDUCATION/TRAINING PROGRAM

## 2025-03-31 PROCEDURE — 73070 X-RAY EXAM OF ELBOW: CPT

## 2025-03-31 PROCEDURE — 99214 OFFICE O/P EST MOD 30 MIN: CPT | Performed by: STUDENT IN AN ORGANIZED HEALTH CARE EDUCATION/TRAINING PROGRAM

## 2025-03-31 PROCEDURE — 1159F MED LIST DOCD IN RCRD: CPT | Performed by: STUDENT IN AN ORGANIZED HEALTH CARE EDUCATION/TRAINING PROGRAM

## 2025-03-31 PROCEDURE — 72170 X-RAY EXAM OF PELVIS: CPT

## 2025-03-31 PROCEDURE — 72110 X-RAY EXAM L-2 SPINE 4/>VWS: CPT

## 2025-03-31 NOTE — PROGRESS NOTES
Subjective:     Encounter Date:03/31/25      Patient ID: Juan C Ca is a 89 y.o. male.    Chief Complaint:   Chief Complaint   Patient presents with    Acute on chronic diatolic CHF (congestive heart failure)     Patient is in the office today for his 1 year follow up appointment.       Hypertension    Hyperlipidemia        Mr. Ca is an 89 y.o. gentleman past medical history of rheumatoid arthritis, remote TBI with mild cognitive impairment, subsequent seizure disorder who presents for further evaluation of dizziness in the setting of a Holter with large PVC burden.    He was seen by Dr. Coppola in our office, and the PVCs were thought to be incidental.  Medications were discontinued.    He underwent a CT chest without contrast by his primary care Dr. Cedillo.He is having some worsening dyspnea.  There were small bilateral pleural effusions on the CT chest, as well as some nodularity in the lingula which may be infectious and inflammatory.    He was started on Jardiance as Lasix was not very effective in controlling her lower extremity edema.  His edema and shortness of breath complaints stabilized.    At last visit, he was noted have continued, elevated PVC burden.  Dawn was 29.2%.  In the setting of his dizziness, amiodarone was trialed.  This reduce the burden to 12.2%, however his symptoms were unchanged    He presents today for follow-up with his daughter.  He has no new complaints.    He was instructed to stop the metoprolol as well with cardiac EP, but he had some labile hypertension after that was stopped     He has had a few continued falls due to gait instability.  These persist despite the lower PVC burden.      Cardiac testing:  Stress test PET myocardial perfusion January 2023:    Findings consistent with a normal ECG stress test. Frequent monomorphic PVC singlets and couplets noted throughout study.  No ventricular runs noted.    Left ventricular ejection fraction is normal  (Calculated EF = 70%).    Myocardial perfusion imaging indicates a normal myocardial perfusion study with no evidence of ischemia.    There is no prior study available for comparison.    Echo July 5, 2024:     Left ventricular systolic function is normal. Calculated left ventricular EF = 61.8% Left ventricular ejection fraction appears to be 61 - 65%.    Left ventricular diastolic function was indeterminate.    The left atrial cavity is mildly dilated.    Estimated right ventricular systolic pressure from tricuspid regurgitation is normal (<35 mmHg). Calculated right ventricular systolic pressure from tricuspid regurgitation is 25 mmHg.       The following portions of the patient's history were reviewed and updated as appropriate: allergies, current medications, past family history, past medical history, past social history, past surgical history and problem list.    Past Medical History:   Diagnosis Date    Cancer     skin cancer    CVD (cerebrovascular disease)     with remote infarcts per CT of head.    H/O Iron deficiency anemia     H/O Traumatic brain injury (CMS/HCC)     H/O: pneumonia 04/2015    Hyperlipidemia     Hypertension     CRISTAL (obstructive sleep apnea)     Premature atrial contraction     Rheumatoid arthritis     Seizures     Started in 1960s.       Past Surgical History:   Procedure Laterality Date    APPENDECTOMY  1950    CATARACT EXTRACTION      COLONOSCOPY N/A 12/12/2017    Procedure: COLONOSCOPY TO CECUM AND TI WITH APC CAUTERY OF RIGHT COLON AVM ;  Surgeon: Lucio Stein MD;  Location: [facility] ENDOSCOPY;  Service:     ENDOSCOPY N/A 12/12/2017    Procedure: ESOPHAGOGASTRODUODENOSCOPY with BX ;  Surgeon: Lucio Stein MD;  Location: [facility] ENDOSCOPY;  Service:            ECG 12 Lead    Date/Time: 3/31/2025 1:46 PM  Performed by: Som Garduno MD    Authorized by: Som Garduno MD  Comparison: compared with previous ECG from 10/18/2024  Comparison to previous ECG: PVCs less prominent on  "today's ECG.  Rhythm: sinus rhythm  Rate: normal  Conduction: incomplete right bundle branch block and left anterior fascicular block  ST Segments: ST segments normal  T Waves: T waves normal  QRS axis: normal  Other: no other findings    Clinical impression: abnormal EKG             Objective:     Vitals:    03/31/25 1332   BP: 160/82   BP Location: Left arm   Patient Position: Sitting   Cuff Size: Adult   Pulse: 50   SpO2: 95%   Weight: 69.4 kg (153 lb)   Height: 172.7 cm (68\")           Constitutional:       Appearance: Healthy appearance. Not in distress.   Neck:      Vascular: JVD normal.   Pulmonary:      Effort: Pulmonary effort is normal.      Breath sounds: Normal breath sounds.   Cardiovascular:      PMI at left midclavicular line. Normal rate. Occasional ectopic beats. Regular rhythm. Normal S2.       Murmurs: There is no murmur.   Pulses:     Intact distal pulses.   Edema:     Peripheral edema absent.   Musculoskeletal:      Comments: Ambulatory via wheelchair Skin:     General: Skin is warm and dry.   Neurological:      General: No focal deficit present.      Mental Status: Alert, oriented to person, place, and time and oriented to person, place and time.   Psychiatric:         Mood and Affect: Mood and affect normal.         Lab Review:       BUN   Date Value Ref Range Status   10/07/2024 19 8 - 23 mg/dL Final     Creatinine   Date Value Ref Range Status   10/07/2024 1.19 0.76 - 1.27 mg/dL Final     Potassium   Date Value Ref Range Status   10/07/2024 4.6 3.5 - 5.2 mmol/L Final     ALT (SGPT)   Date Value Ref Range Status   10/07/2024 18 1 - 41 U/L Final     AST (SGOT)   Date Value Ref Range Status   10/07/2024 22 1 - 40 U/L Final        Performed        Assessment:          Diagnosis Plan   1. PVC (premature ventricular contraction)  ECG 12 Lead      2. Chronic diastolic (congestive) heart failure        3. Primary hypertension        4. Dizziness                   Plan:           Dizziness  Premature " ventricular contractions  Dizziness is multifactorial, there was an elevated PVC burden, however these were suppressed with amiodarone from a burden decreasing from 30% to 10%, yet symptoms persisted.  I do not think the PVCs are contributing to his symptoms.  Stop amiodarone   Given no significant change in his symptoms with PVC suppression, I think these are all incidental.  Reviewed his echocardiogram and stress test, unremarkable  Suspected chronic diastolic CHF.  Echocardiogram was unremarkable May 2024  BMP was unremarkable  However, he did have some bilateral pleural effusions, and there is likely component of diastolic CHF, mild and multifactorial.    He is tolerating Jardiance well, reasonable to continue.  He does have urinary incontinence  Rheumatoid arthritis.  Seizure disorder.  History of TBI  Abnormal ABIs in the setting of borderline hyperlipidemia.  I directly reviewed interpreted his CT imaging obtained in 2024.  There is a trivial amount of coronary artery calcium, arterial age is probably around 50 to 60 years old.  I do not feel strongly about a statin from my standpoint.        RTC 6 months for further monitoring of diastolic CHF.  He appears euvolemic today..       Current Outpatient Medications:     acetaminophen (TYLENOL) 500 MG tablet, Take 2 tablets by mouth Every 6 (Six) Hours As Needed for Mild Pain  or Fever., Disp:  , Rfl:     aspirin 81 MG EC tablet, Take 1 tablet by mouth Daily. Indications: Buildup of Plaques in Large Arteries Leading to the Brain, Disp: , Rfl:     cetirizine (zyrTEC) 10 MG tablet, Take 0.5 tablets by mouth Daily., Disp: , Rfl:     docusate sodium (COLACE) 100 MG capsule, Take 1 capsule by mouth 2 (Two) Times a Day. PRN  Indications: Constipation, Disp: , Rfl:     empagliflozin (Jardiance) 10 MG tablet tablet, Take 1 tablet by mouth Daily., Disp: 30 tablet, Rfl: 11    famotidine (PEPCID) 20 MG tablet, Take 1 tablet by mouth Daily., Disp: 30 tablet, Rfl: 3     hydroxychloroquine (PLAQUENIL) 200 MG tablet, Take 1 tablet by mouth Daily. Indications: Rheumatoid Arthritis, Disp: , Rfl:     leflunomide (ARAVA) 20 MG tablet, Take 0.5 tablets by mouth Daily. Indications: Rheumatoid Arthritis, Disp: , Rfl:     levETIRAcetam (KEPPRA) 500 MG tablet, Take 1 tablet by mouth 2 (Two) Times a Day. Indications: Seizure, Disp: , Rfl:     levothyroxine (SYNTHROID, LEVOTHROID) 25 MCG tablet, Take 1 tablet by mouth Daily. Indications: Underactive Thyroid, Disp: , Rfl:     memantine (NAMENDA) 10 MG tablet, Take 1 tablet by mouth Daily. 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 Indications: Nutritional Support, Disp: , Rfl:     multivitamins-minerals (PRESERVISION AREDS 2) capsule capsule, , Disp: , Rfl:     tamsulosin (FLOMAX) 0.4 MG capsule 24 hr capsule, Take 1 capsule by mouth every night., Disp: 30 capsule, Rfl: 0    Vibegron (Gemtesa) 75 MG tablet, Take  by mouth., Disp: , Rfl:     vitamin B-12 (CYANOCOBALAMIN) 1000 MCG tablet, Take 0.5 tablets by mouth Daily., Disp: , Rfl:     vitamin D3 125 MCG (5000 UT) capsule capsule, Indications: Vitamin D Deficiency, Disp: , Rfl:          Return in about 6 months (around 9/30/2025).      **Dragon Disclaimer:   Much of this encounter note is an electronic transcription/translation of spoken language to printed text. The electronic translation of spoken language may permit erroneous, or at times, nonsensical words or phrases to be inadvertently transcribed. Although I have reviewed the note for such errors, some may still exist.

## 2025-03-31 NOTE — PATIENT INSTRUCTIONS
Like we discussed, you may intermittently have premature heartbeats and the bottom chamber of the heart, called PVCs, but despite us decreasing the amount of PVCs with a medication called amiodarone, it does not appear that we made you feel any better or different.    Because of this, I would like for you to stop the amiodarone medication.  I do not think that it would be providing any significant benefit especially since your heart ultrasounds have been normal.    From my standpoint, given the lack of calcium deposits in your heart arteries, I do not feel strongly about treating high cholesterol with the medication.    I think we should continue on the Jardiance medication, there is probably a little bit of age-related stiffness of the heart muscle they can cause you to hang onto fluid, the Jardiance should help take care of that.  I do not think you need to weigh yourself every day, perhaps once a week weights will be helpful to make sure that you do not continue to gain weight that might suggest congestive heart failure.

## 2025-04-21 ENCOUNTER — TELEPHONE (OUTPATIENT)
Dept: SLEEP MEDICINE | Facility: HOSPITAL | Age: 89
End: 2025-04-21
Payer: MEDICARE

## 2025-05-16 ENCOUNTER — HOSPITAL ENCOUNTER (OUTPATIENT)
Dept: GENERAL RADIOLOGY | Facility: HOSPITAL | Age: 89
Discharge: HOME | End: 2025-05-16
Payer: MEDICARE

## 2025-05-16 ENCOUNTER — TRANSCRIBE ORDERS (OUTPATIENT)
Dept: ADMINISTRATIVE | Facility: HOSPITAL | Age: 89
End: 2025-05-16
Payer: MEDICARE

## 2025-05-16 ENCOUNTER — LAB (OUTPATIENT)
Dept: LAB | Facility: HOSPITAL | Age: 89
End: 2025-05-16
Payer: MEDICARE

## 2025-05-16 DIAGNOSIS — R05.9 COMPLAINING OF COUGH: ICD-10-CM

## 2025-05-16 DIAGNOSIS — I51.9 MYXEDEMA HEART DISEASE: ICD-10-CM

## 2025-05-16 DIAGNOSIS — R05.9 COUGH, UNSPECIFIED TYPE: Primary | ICD-10-CM

## 2025-05-16 DIAGNOSIS — E03.9 MYXEDEMA HEART DISEASE: ICD-10-CM

## 2025-05-16 DIAGNOSIS — R05.9 COUGH, UNSPECIFIED TYPE: ICD-10-CM

## 2025-05-16 LAB
ALBUMIN SERPL-MCNC: 3.7 G/DL (ref 3.5–5.2)
ALBUMIN/GLOB SERPL: 1.5 G/DL
ALP SERPL-CCNC: 90 U/L (ref 39–117)
ALT SERPL W P-5'-P-CCNC: 21 U/L (ref 1–41)
ANION GAP SERPL CALCULATED.3IONS-SCNC: 9.8 MMOL/L (ref 5–15)
AST SERPL-CCNC: 35 U/L (ref 1–40)
BASOPHILS # BLD AUTO: 0.01 10*3/MM3 (ref 0–0.2)
BASOPHILS NFR BLD AUTO: 0.2 % (ref 0–1.5)
BILIRUB SERPL-MCNC: 0.4 MG/DL (ref 0–1.2)
BUN SERPL-MCNC: 18 MG/DL (ref 8–23)
BUN/CREAT SERPL: 14.5 (ref 7–25)
CALCIUM SPEC-SCNC: 8.9 MG/DL (ref 8.6–10.5)
CHLORIDE SERPL-SCNC: 105 MMOL/L (ref 98–107)
CO2 SERPL-SCNC: 24.2 MMOL/L (ref 22–29)
CREAT SERPL-MCNC: 1.24 MG/DL (ref 0.76–1.27)
DEPRECATED RDW RBC AUTO: 45 FL (ref 37–54)
EGFRCR SERPLBLD CKD-EPI 2021: 56.3 ML/MIN/1.73
EOSINOPHIL # BLD AUTO: 0.05 10*3/MM3 (ref 0–0.4)
EOSINOPHIL NFR BLD AUTO: 0.9 % (ref 0.3–6.2)
ERYTHROCYTE [DISTWIDTH] IN BLOOD BY AUTOMATED COUNT: 12.3 % (ref 12.3–15.4)
GLOBULIN UR ELPH-MCNC: 2.5 GM/DL
GLUCOSE SERPL-MCNC: 69 MG/DL (ref 65–99)
HCT VFR BLD AUTO: 39.1 % (ref 37.5–51)
HGB BLD-MCNC: 13.2 G/DL (ref 13–17.7)
IMM GRANULOCYTES # BLD AUTO: 0.01 10*3/MM3 (ref 0–0.05)
IMM GRANULOCYTES NFR BLD AUTO: 0.2 % (ref 0–0.5)
LYMPHOCYTES # BLD AUTO: 0.94 10*3/MM3 (ref 0.7–3.1)
LYMPHOCYTES NFR BLD AUTO: 17.1 % (ref 19.6–45.3)
MCH RBC QN AUTO: 33.2 PG (ref 26.6–33)
MCHC RBC AUTO-ENTMCNC: 33.8 G/DL (ref 31.5–35.7)
MCV RBC AUTO: 98.2 FL (ref 79–97)
MONOCYTES # BLD AUTO: 0.95 10*3/MM3 (ref 0.1–0.9)
MONOCYTES NFR BLD AUTO: 17.3 % (ref 5–12)
NEUTROPHILS NFR BLD AUTO: 3.54 10*3/MM3 (ref 1.7–7)
NEUTROPHILS NFR BLD AUTO: 64.3 % (ref 42.7–76)
PLATELET # BLD AUTO: 144 10*3/MM3 (ref 140–450)
PMV BLD AUTO: 10.8 FL (ref 6–12)
POTASSIUM SERPL-SCNC: 4.3 MMOL/L (ref 3.5–5.2)
PROT SERPL-MCNC: 6.2 G/DL (ref 6–8.5)
RBC # BLD AUTO: 3.98 10*6/MM3 (ref 4.14–5.8)
SODIUM SERPL-SCNC: 139 MMOL/L (ref 136–145)
T4 FREE SERPL-MCNC: 0.9 NG/DL (ref 0.92–1.68)
TSH SERPL DL<=0.05 MIU/L-ACNC: 19.1 UIU/ML (ref 0.27–4.2)
WBC NRBC COR # BLD AUTO: 5.5 10*3/MM3 (ref 3.4–10.8)

## 2025-05-16 PROCEDURE — 85025 COMPLETE CBC W/AUTO DIFF WBC: CPT

## 2025-05-16 PROCEDURE — 80053 COMPREHEN METABOLIC PANEL: CPT

## 2025-05-16 PROCEDURE — 84443 ASSAY THYROID STIM HORMONE: CPT

## 2025-05-16 PROCEDURE — 84439 ASSAY OF FREE THYROXINE: CPT

## 2025-05-16 PROCEDURE — 71046 X-RAY EXAM CHEST 2 VIEWS: CPT

## 2025-05-16 PROCEDURE — 36415 COLL VENOUS BLD VENIPUNCTURE: CPT

## 2025-05-23 ENCOUNTER — TRANSCRIBE ORDERS (OUTPATIENT)
Dept: ADMINISTRATIVE | Facility: HOSPITAL | Age: 89
End: 2025-05-23
Payer: MEDICARE

## 2025-05-23 DIAGNOSIS — R91.1 LUNG NODULE: Primary | ICD-10-CM

## 2025-06-24 ENCOUNTER — OFFICE VISIT (OUTPATIENT)
Age: 89
End: 2025-06-24
Payer: MEDICARE

## 2025-06-24 ENCOUNTER — LAB (OUTPATIENT)
Dept: LAB | Facility: HOSPITAL | Age: 89
End: 2025-06-24
Payer: MEDICARE

## 2025-06-24 ENCOUNTER — TRANSCRIBE ORDERS (OUTPATIENT)
Dept: LAB | Facility: HOSPITAL | Age: 89
End: 2025-06-24
Payer: MEDICARE

## 2025-06-24 ENCOUNTER — HOSPITAL ENCOUNTER (OUTPATIENT)
Facility: HOSPITAL | Age: 89
Discharge: HOME | End: 2025-06-24
Payer: MEDICARE

## 2025-06-24 VITALS
HEART RATE: 55 BPM | BODY MASS INDEX: 23.79 KG/M2 | HEIGHT: 68 IN | SYSTOLIC BLOOD PRESSURE: 164 MMHG | WEIGHT: 157 LBS | DIASTOLIC BLOOD PRESSURE: 65 MMHG

## 2025-06-24 DIAGNOSIS — E03.9 HYPOTHYROIDISM, ADULT: Primary | ICD-10-CM

## 2025-06-24 DIAGNOSIS — E03.9 HYPOTHYROIDISM, ADULT: ICD-10-CM

## 2025-06-24 DIAGNOSIS — I73.9 PERIPHERAL VASCULAR DISEASE, UNSPECIFIED: Primary | ICD-10-CM

## 2025-06-24 DIAGNOSIS — R05.9 COUGH, UNSPECIFIED TYPE: ICD-10-CM

## 2025-06-24 DIAGNOSIS — I73.9 PERIPHERAL VASCULAR DISEASE, UNSPECIFIED: ICD-10-CM

## 2025-06-24 LAB
ALBUMIN SERPL-MCNC: 3.6 G/DL (ref 3.5–5.2)
ALBUMIN/GLOB SERPL: 1.6 G/DL
ALP SERPL-CCNC: 81 U/L (ref 39–117)
ALT SERPL W P-5'-P-CCNC: 18 U/L (ref 1–41)
ANION GAP SERPL CALCULATED.3IONS-SCNC: 8.2 MMOL/L (ref 5–15)
AST SERPL-CCNC: 25 U/L (ref 1–40)
BH CV LOWER ARTERIAL LEFT ABI RATIO: 0.88
BH CV LOWER ARTERIAL LEFT DORSALIS PEDIS SYS MAX: 152
BH CV LOWER ARTERIAL LEFT POST TIBIAL SYS MAX: 168
BH CV LOWER ARTERIAL RIGHT ABI RATIO: 0.73
BH CV LOWER ARTERIAL RIGHT DORSALIS PEDIS SYS MAX: 110
BH CV LOWER ARTERIAL RIGHT POST TIBIAL SYS MAX: 140
BILIRUB SERPL-MCNC: 0.4 MG/DL (ref 0–1.2)
BUN SERPL-MCNC: 17 MG/DL (ref 8–23)
BUN/CREAT SERPL: 11.3 (ref 7–25)
CALCIUM SPEC-SCNC: 8.8 MG/DL (ref 8.6–10.5)
CHLORIDE SERPL-SCNC: 105 MMOL/L (ref 98–107)
CO2 SERPL-SCNC: 24.8 MMOL/L (ref 22–29)
CREAT SERPL-MCNC: 1.5 MG/DL (ref 0.76–1.27)
EGFRCR SERPLBLD CKD-EPI 2021: 44.8 ML/MIN/1.73
GLOBULIN UR ELPH-MCNC: 2.3 GM/DL
GLUCOSE SERPL-MCNC: 98 MG/DL (ref 65–99)
POTASSIUM SERPL-SCNC: 4.6 MMOL/L (ref 3.5–5.2)
PROT SERPL-MCNC: 5.9 G/DL (ref 6–8.5)
SODIUM SERPL-SCNC: 138 MMOL/L (ref 136–145)
T4 FREE SERPL-MCNC: 1.03 NG/DL (ref 0.92–1.68)
TSH SERPL DL<=0.05 MIU/L-ACNC: 20.3 UIU/ML (ref 0.27–4.2)
UPPER ARTERIAL LEFT ARM BRACHIAL SYS MAX: 190
UPPER ARTERIAL RIGHT ARM BRACHIAL SYS MAX: 180

## 2025-06-24 PROCEDURE — 36415 COLL VENOUS BLD VENIPUNCTURE: CPT

## 2025-06-24 PROCEDURE — 1160F RVW MEDS BY RX/DR IN RCRD: CPT | Performed by: SURGERY

## 2025-06-24 PROCEDURE — 99213 OFFICE O/P EST LOW 20 MIN: CPT | Performed by: SURGERY

## 2025-06-24 PROCEDURE — 80053 COMPREHEN METABOLIC PANEL: CPT

## 2025-06-24 PROCEDURE — 93922 UPR/L XTREMITY ART 2 LEVELS: CPT

## 2025-06-24 PROCEDURE — 84443 ASSAY THYROID STIM HORMONE: CPT

## 2025-06-24 PROCEDURE — 84439 ASSAY OF FREE THYROXINE: CPT

## 2025-06-24 PROCEDURE — 93922 UPR/L XTREMITY ART 2 LEVELS: CPT | Performed by: SURGERY

## 2025-06-24 PROCEDURE — 1159F MED LIST DOCD IN RCRD: CPT | Performed by: SURGERY

## 2025-06-24 RX ORDER — AMIODARONE HYDROCHLORIDE 200 MG/1
1 TABLET ORAL DAILY
COMMUNITY

## 2025-06-24 NOTE — PROGRESS NOTES
"Chief Complaint  Peripheral Vascular Disease    Subjective        Juan C Ca presents to [facility] VASCULAR SURGERY  History of Present Illness    Patient recently transitioning to care facility.  This was after a fall in February with late left great toe injury.  Since the fall he seen his podiatrist ABIs have been ordered but his toe injury has been able to heal.  Patient currently expresses no lower extremity ischemic symptoms.    Objective   Vital Signs:  /65   Pulse 55   Ht 172.7 cm (68\")   Wt 71.2 kg (157 lb)   BMI 23.87 kg/m²   Estimated body mass index is 23.87 kg/m² as calculated from the following:    Height as of this encounter: 172.7 cm (68\").    Weight as of this encounter: 71.2 kg (157 lb).     BMI is within normal parameters. No other follow-up for BMI required.    Juan C Ca  reports that he quit smoking about 37 years ago. His smoking use included cigarettes. He started smoking about 52 years ago. He has been exposed to tobacco smoke. He has never used smokeless tobacco.      Physical Exam  Constitutional:       Appearance: He is well-developed.   Pulmonary:      Effort: Pulmonary effort is normal. No respiratory distress.   Abdominal:      General: There is no distension.      Palpations: Abdomen is soft.   Neurological:      Mental Status: He is alert and oriented to person, place, and time.     1+ pulses bilateral feet.    Result Review :    The following data was reviewed by: Julian Geiger MD on 06/24/25  CBC          10/24/2024    14:11 2/18/2025    13:39 5/16/2025    14:20   CBC   WBC 6.81  6.83  5.50    RBC 3.58  3.67  3.98    Hemoglobin 11.5  12.1  13.2    Hematocrit 34.8  36.3  39.1    MCV 97.2  98.9  98.2    MCH 32.1  33.0  33.2    MCHC 33.0  33.3  33.8    RDW 14.3  14.5  12.3    Platelets 200  148  144       BMP          7/15/2024    12:44 10/7/2024    12:55 5/16/2025    14:20   BMP   BUN 24  19  18    Creatinine 1.18  1.19  1.24    Sodium 139  " 139  139    Potassium 4.6  4.6  4.3    Chloride 103  106  105    CO2 25.0  24.0  24.2    Calcium 9.6  9.3  8.9           Data reviewed : Cardiology studies as below  Vascular Surgical History:    Vascular Imaging History:    JOSE D:  6/24/2025:  Right 0.73, Left 0.88        (Text in bold font has been individually reviewed by myself and confirmed)         Assessment and Plan     Vascular surgery following for:  Peripheral arterial disease.    Diagnoses and all orders for this visit:    1. Peripheral vascular disease, unspecified (Primary)  -     Doppler Ankle Brachial Index Single Level CAR; Future      Follow-up patient evaluation elderly gentleman 89 years old for mild peripheral arterial disease seen on ABIs at [facility].  Sees podiatry for left great toe injury.  Has been able to heal this injury occurred after a fall.  He is on relatively good medical therapy with aspirin.  He has good vascular control of his risk factors.    ABIs today reveal mild to moderate disease bilaterally.  In the absence of vascular insufficiency symptoms would not recommend any intervention.  Will follow-up in 6 to 12 months with repeat ABIs.  Patient and family have been instructed to call with any ischemic symptoms        Vascular medical management: Patient should continue aspirin 81 mg daily.  Given his advanced age and relatively mild peripheral arterial disease patient reports that he has normal cholesterol in the past.  I feel no compelling indication for statin therapy unless he has dyslipidemia.  Return in about 1 year (around 6/24/2026), or if symptoms worsen or fail to improve, for Follow up with vascular ultrasound.  Patient was given instructions and counseling regarding his condition or for health maintenance advice. Please see specific information pulled into the AVS if appropriate.

## 2025-06-27 ENCOUNTER — HOSPITAL ENCOUNTER (OUTPATIENT)
Dept: PET IMAGING | Facility: HOSPITAL | Age: 89
Discharge: HOME | End: 2025-06-27
Payer: MEDICARE

## 2025-06-27 DIAGNOSIS — R91.1 LUNG NODULE: ICD-10-CM

## 2025-06-27 PROCEDURE — 71250 CT THORAX DX C-: CPT

## 2025-08-08 ENCOUNTER — LAB (OUTPATIENT)
Dept: LAB | Facility: HOSPITAL | Age: 89
End: 2025-08-08
Payer: MEDICARE

## 2025-08-08 ENCOUNTER — TRANSCRIBE ORDERS (OUTPATIENT)
Dept: LAB | Facility: HOSPITAL | Age: 89
End: 2025-08-08
Payer: MEDICARE

## 2025-08-08 DIAGNOSIS — E03.9 HYPOTHYROIDISM, ADULT: ICD-10-CM

## 2025-08-08 DIAGNOSIS — E03.9 HYPOTHYROIDISM, ADULT: Primary | ICD-10-CM

## 2025-08-08 LAB
ALBUMIN SERPL-MCNC: 3.8 G/DL (ref 3.5–5.2)
ALBUMIN/GLOB SERPL: 1.5 G/DL
ALP SERPL-CCNC: 86 U/L (ref 39–117)
ALT SERPL W P-5'-P-CCNC: 15 U/L (ref 1–41)
ANION GAP SERPL CALCULATED.3IONS-SCNC: 8.2 MMOL/L (ref 5–15)
AST SERPL-CCNC: 21 U/L (ref 1–40)
BILIRUB SERPL-MCNC: 0.3 MG/DL (ref 0–1.2)
BUN SERPL-MCNC: 16 MG/DL (ref 8–23)
BUN/CREAT SERPL: 13 (ref 7–25)
CALCIUM SPEC-SCNC: 9 MG/DL (ref 8.6–10.5)
CHLORIDE SERPL-SCNC: 105 MMOL/L (ref 98–107)
CO2 SERPL-SCNC: 25.8 MMOL/L (ref 22–29)
CREAT SERPL-MCNC: 1.23 MG/DL (ref 0.76–1.27)
EGFRCR SERPLBLD CKD-EPI 2021: 56.8 ML/MIN/1.73
GLOBULIN UR ELPH-MCNC: 2.5 GM/DL
GLUCOSE SERPL-MCNC: 96 MG/DL (ref 65–99)
POTASSIUM SERPL-SCNC: 4.7 MMOL/L (ref 3.5–5.2)
PROT SERPL-MCNC: 6.3 G/DL (ref 6–8.5)
SODIUM SERPL-SCNC: 139 MMOL/L (ref 136–145)
T4 FREE SERPL-MCNC: 1.21 NG/DL (ref 0.92–1.68)
TSH SERPL DL<=0.05 MIU/L-ACNC: 14.8 UIU/ML (ref 0.27–4.2)

## 2025-08-08 PROCEDURE — 36415 COLL VENOUS BLD VENIPUNCTURE: CPT

## 2025-08-08 PROCEDURE — 80053 COMPREHEN METABOLIC PANEL: CPT

## 2025-08-08 PROCEDURE — 84443 ASSAY THYROID STIM HORMONE: CPT

## 2025-08-08 PROCEDURE — 84439 ASSAY OF FREE THYROXINE: CPT

## (undated) DEVICE — CANN NASL HI FLO COMFRT SFT A/ 7FT

## (undated) DEVICE — ERBE NESSY®PLATE 170 SPLIT; 168CM²; CABLE 3M: Brand: ERBE

## (undated) DEVICE — SINGLE-USE BIOPSY FORCEPS: Brand: RADIAL JAW 4

## (undated) DEVICE — APC PROBE 2200 A, SINGLE USE OD 2.3MM/6.9FR; L. 2.2M/7.2FT: Brand: ERBE

## (undated) DEVICE — TUBING, SUCTION, 1/4" X 10', STRAIGHT: Brand: MEDLINE

## (undated) DEVICE — TBG 02 CRUSH RESIST LF CLR 7FT

## (undated) DEVICE — BITEBLOCK OMNI BLOC

## (undated) DEVICE — THE TORRENT IRRIGATION SCOPE CONNECTOR IS USED WITH THE TORRENT IRRIGATION TUBING TO PROVIDE IRRIGATION FLUIDS SUCH AS STERILE WATER DURING GASTROINTESTINAL ENDOSCOPIC PROCEDURES WHEN USED IN CONJUNCTION WITH AN IRRIGATION PUMP (OR ELECTROSURGICAL UNIT).: Brand: TORRENT

## (undated) DEVICE — Device: Brand: DEFENDO AIR/WATER/SUCTION AND BIOPSY VALVE